# Patient Record
Sex: FEMALE | Race: AMERICAN INDIAN OR ALASKA NATIVE | NOT HISPANIC OR LATINO | Employment: OTHER | URBAN - METROPOLITAN AREA
[De-identification: names, ages, dates, MRNs, and addresses within clinical notes are randomized per-mention and may not be internally consistent; named-entity substitution may affect disease eponyms.]

---

## 2017-01-16 ENCOUNTER — OFFICE VISIT (OUTPATIENT)
Dept: URGENT CARE | Facility: PHYSICIAN GROUP | Age: 67
End: 2017-01-16
Payer: MEDICARE

## 2017-01-16 VITALS
DIASTOLIC BLOOD PRESSURE: 94 MMHG | HEIGHT: 64 IN | SYSTOLIC BLOOD PRESSURE: 146 MMHG | HEART RATE: 98 BPM | OXYGEN SATURATION: 94 % | BODY MASS INDEX: 33.97 KG/M2 | RESPIRATION RATE: 20 BRPM | WEIGHT: 199 LBS | TEMPERATURE: 98.8 F

## 2017-01-16 DIAGNOSIS — J20.8 ACUTE BRONCHITIS DUE TO OTHER SPECIFIED ORGANISMS: ICD-10-CM

## 2017-01-16 PROCEDURE — 99214 OFFICE O/P EST MOD 30 MIN: CPT | Performed by: PHYSICIAN ASSISTANT

## 2017-01-16 RX ORDER — ACETAMINOPHEN 325 MG/1
650 TABLET ORAL EVERY 4 HOURS PRN
COMMUNITY
End: 2017-05-16

## 2017-01-16 RX ORDER — IBUPROFEN 200 MG
200 TABLET ORAL EVERY 6 HOURS PRN
COMMUNITY
End: 2017-05-16

## 2017-01-16 RX ORDER — METHYLPREDNISOLONE 4 MG/1
TABLET ORAL
Qty: 21 TAB | Refills: 0 | Status: SHIPPED | OUTPATIENT
Start: 2017-01-16 | End: 2017-05-16

## 2017-01-16 RX ORDER — AZITHROMYCIN 250 MG/1
TABLET, FILM COATED ORAL
Qty: 6 TAB | Refills: 0 | Status: SHIPPED | OUTPATIENT
Start: 2017-01-16 | End: 2017-05-16

## 2017-01-16 NOTE — PROGRESS NOTES
Chief Complaint   Patient presents with   • Cold Symptoms     x2 weeks. Productive cough, sore throat, headache, congestion.       HISTORY OF PRESENT ILLNESS: Patient is a 66 y.o. female who presents today for 2 weeks of worsening chest congestion.  She is coughing up a lot of mucus.   She has been feeling SOB and feels she could have benefited from a rescue inhaler but does not have one herself.  She states her upper respiratory symptoms have improved and mostly in her chest at this point.  She states she has felt feverish on and off, chills.   She has no hx of smoking or COPD. Does have history of very mild asthma.   She has not taken anything for the cough.      Patient Active Problem List    Diagnosis Date Noted   • Bile leak, postoperative 08/06/2014     Priority: High     Class: Acute   • Abdominal pain 07/29/2014     Priority: High   • Common bile duct dilatation 07/29/2014     Priority: High   • HTN (hypertension) 08/09/2014     Priority: Medium   • Leukocytosis 08/07/2014     Priority: Medium   • Nausea & vomiting 08/06/2014     Priority: Medium   • Diarrhea 07/29/2014     Priority: Medium   • S/P laparoscopic cholecystectomy 08/06/2014     Priority: Low   • Gastric dilatation 08/06/2014     Priority: Low   • Mild intermittent asthma without complication 03/22/2016   • Encounter for cosmetic surgery 01/15/2016   • Other acquired absence of organ 10/08/2014   • Cholecystitis 08/04/2014       Allergies:Codeine; Eggs; Latex; and Pcn    Current Outpatient Prescriptions Ordered in [facility]   Medication Sig Dispense Refill   • acetaminophen (TYLENOL) 325 MG Tab Take 650 mg by mouth every four hours as needed.     • ibuprofen (MOTRIN) 200 MG Tab Take 200 mg by mouth every 6 hours as needed.     • fluconazole (DIFLUCAN) 150 MG tablet Take one tab PO day #1, wait 4 days, if still w/ s/sx take 2nd tab PO 2 Tab 0   • benzonatate (TESSALON) 100 MG Cap Take 1 Cap by mouth 3 times a day as needed for Cough. 60 Cap 0   •  "fluticasone (FLONASE) 50 MCG/ACT nasal spray Spray 2 Sprays in nose every day. 16 g 0   • meloxicam (MOBIC) 7.5 MG Tab Take 1 Tab by mouth every day. 30 Tab 0     No current Epic-ordered facility-administered medications on file.       Past Medical History   Diagnosis Date   • Renal disorder      kidney stones 2008   • Snoring    • Other specified disorder of intestines      diarrhea   • Breath shortness      asthma related   • ASTHMA      does not take inhaler( cold air and cigaretts)   • Anesthesia      sensitive to medications   • Bronchitis      hx [id number], 2010       Social History   Substance Use Topics   • Smoking status: Never Smoker    • Smokeless tobacco: Never Used      Comment: 1 ppd x 5 years; quit 1973   • Alcohol Use: No       No family status information on file.   No family history on file.No pertinent FH.     ROS:  Review of Systems   Constitutional: SEE HPI  HENT: Negative for ear pain, nosebleeds, congestion, sore throat and neck pain.    Eyes: Negative for blurred vision.   Respiratory: SEE HPI  Cardiovascular: Negative for chest pain, palpitations, orthopnea and leg swelling.   Gastrointestinal: Negative for heartburn, nausea, vomiting and abdominal pain.   All other systems reviewed and are negative.       Exam:  Blood pressure 146/94, pulse 98, temperature 37.1 °C (98.8 °F), resp. rate 20, height 1.626 m (5' 4\"), weight 90.266 kg (199 lb), SpO2 94 %, not currently breastfeeding.  General:  Well nourished, well developed female in NAD  Eyes: PERRLA, EOM within normal limits, no conjunctival injection, no scleral icterus, visual fields and acuity grossly intact.  Ears: Normal shape and symmetry, no tenderness, no discharge. External canals are without any significant edema or erythema. Tympanic membranes are without any inflammation, no effusion. Gross auditory acuity is intact  Nose: Symmetrical, sinuses without tenderness, clear rhinorrhea.   Mouth: reasonable hygiene, no erythema " exudates or tonsillar enlargement.  Neck: no masses, range of motion within normal limits, no tracheal deviation. No lymphadenopathy  Pulmonary: Normal respiratory effort, inspiratory and expiratory wheezes without crackles or rhonchi.   Cardiovascular: regular rate and rhythm without murmurs, rubs, or gallops.  Skin: No visible rashes or lesion. Warm, pink, dry.   Extremities: no clubbing, cyanosis, or edema.  Neuro: A&O x 3. Speech normal/clear.  Normal gait.         Assessment/Plan:  1. Acute bronchitis due to other specified organisms  azithromycin (ZITHROMAX) 250 MG Tab    MethylPREDNISolone (MEDROL DOSEPAK) 4 MG Tablet Therapy Pack       -fluids, rest emphasized.  Allegra or similar for post nasal drainage trial.   -humidifier/steam inhalations.  Good lung care emphasized.   -has new Proventil at home that  filled but hasnt had to use, will use this.   -Zpack/Medrol rx as above.      Supportive care, differential diagnoses, and indications for immediate follow-up discussed with patient.   Pathogenesis of diagnosis discussed including typical length and natural progression.   Instructed to return to clinic or nearest emergency department for any change in condition, further concerns, or worsening of symptoms.  Patient states understanding of the plan of care and discharge instructions.  Instructed to make an appointment, for follow up, with their primary care provider.      Bev Eugene PA-C

## 2017-01-16 NOTE — MR AVS SNAPSHOT
"        Bella Navarro   2017 10:55 AM   Office Visit   MRN: [id number]    Department:  [facility]   Dept Phone:  [phone]    Description:  Female : 1950   Provider:  Bev Eugene PA-C           Reason for Visit     Cold Symptoms x2 weeks. Productive cough, sore throat, headache, congestion.      Allergies as of 2017     Allergen Noted Reactions    Codeine 2014   Anaphylaxis    Eggs 2014   Diarrhea, Vomiting    GI distress.    Latex 2016   Rash    .    Pcn [Penicillins] 2014   Shortness of Breath      You were diagnosed with     Acute bronchitis due to other specified organisms   [4282686]         Vital Signs     Blood Pressure Pulse Temperature Respirations Height Weight    146/94 mmHg 98 37.1 °C (98.8 °F) 20 1.626 m (5' 4\") 90.266 kg (199 lb)    Body Mass Index Oxygen Saturation Breastfeeding? Smoking Status          34.14 kg/m2 94% No Never Smoker         Basic Information     Date Of Birth Sex Race Ethnicity Preferred Language    1950 Female White Non- English      Problem List              ICD-10-CM Priority Class Noted - Resolved    Abdominal pain R10.9 High  2014 - Present    Common bile duct dilatation K83.8 High  2014 - Present    Diarrhea R19.7 Medium  2014 - Present    Cholecystitis K81.9   2014 - Present    Bile leak, postoperative K91.89, K83.8 High Acute 2014 - Present    Nausea & vomiting R11.2 Medium  2014 - Present    S/P laparoscopic cholecystectomy Z90.49 Low  2014 - Present    Gastric dilatation K31.89 Low  2014 - Present    Leukocytosis D72.829 Medium  2014 - Present    HTN (hypertension) I10 Medium  2014 - Present    Other acquired absence of organ Z90.89   10/8/2014 - Present    Encounter for cosmetic surgery Z41.1   1/15/2016 - Present    Mild intermittent asthma without complication J45.20   3/22/2016 - Present      Health Maintenance        Date Due Completion Dates   " IMM PNEUMOCOCCAL 19-64 (ADULT) MEDIUM RISK SERIES (1 of 1 - PPSV23) 1/19/1971 ---    PAP SMEAR 1/19/1973 ---    MAMMOGRAM 1/19/1992 ---    COLONOSCOPY 1/19/2002 ---    IMM ZOSTER VACCINE 1/19/2012 ---    IMM INFLUENZA (1) 9/1/2016 ---    IMM DTaP/Tdap/Td Vaccine (2 - Td) 8/18/2026 8/18/2016            Current Immunizations     Tdap Vaccine 8/18/2016 10:56 PM      Below and/or attached are the medications your provider expects you to take. Review all of your home medications and newly ordered medications with your provider and/or pharmacist. Follow medication instructions as directed by your provider and/or pharmacist. Please keep your medication list with you and share with your provider. Update the information when medications are discontinued, doses are changed, or new medications (including over-the-counter products) are added; and carry medication information at all times in the event of emergency situations     Allergies:  CODEINE - Anaphylaxis     EGGS - Diarrhea,Vomiting     LATEX - Rash     PCN - Shortness of Breath               Medications  Valid as of: January 16, 2017 - 11:32 AM    Generic Name Brand Name Tablet Size Instructions for use    Acetaminophen (Tab) TYLENOL 325 MG Take 650 mg by mouth every four hours as needed.        Azithromycin (Tab) ZITHROMAX 250 MG Take as directed        Benzonatate (Cap) TESSALON 100 MG Take 1 Cap by mouth 3 times a day as needed for Cough.        Fluconazole (Tab) DIFLUCAN 150 MG Take one tab PO day #1, wait 4 days, if still w/ s/sx take 2nd tab PO        Fluticasone Propionate (Suspension) FLONASE 50 MCG/ACT Spray 2 Sprays in nose every day.        Ibuprofen (Tab) MOTRIN 200 MG Take 200 mg by mouth every 6 hours as needed.        Meloxicam (Tab) MOBIC 7.5 MG Take 1 Tab by mouth every day.        MethylPREDNISolone (Tablet Therapy Pack) MEDROL DOSEPAK 4 MG Take as directed        .                 Medicines prescribed today were sent to:     [id number] [place] -  [address] [facility]    [address] [facility]. [address] [place] [address]    Phone: [phone] Fax: [phone]    Open 24 Hours?: No      Medication refill instructions:       If your prescription bottle indicates you have medication refills left, it is not necessary to call your provider’s office. Please contact your pharmacy and they will refill your medication.    If your prescription bottle indicates you do not have any refills left, you may request refills at any time through one of the following ways: The online [email or web address] system (except Urgent Care), by calling your provider’s office, or by asking your pharmacy to contact your provider’s office with a refill request. Medication refills are processed only during regular business hours and may not be available until the next business day. Your provider may request additional information or to have a follow-up visit with you prior to refilling your medication.   *Please Note: Medication refills are assigned a new Rx number when refilled electronically. Your pharmacy may indicate that no refills were authorized even though a new prescription for the same medication is available at the pharmacy. Please request the medicine by name with the pharmacy before contacting your provider for a refill.           [email or web address] Access Code: Activation code not generated  Current [email or web address] Status: Active

## 2017-01-26 ENCOUNTER — APPOINTMENT (OUTPATIENT)
Dept: RADIOLOGY | Facility: IMAGING CENTER | Age: 67
End: 2017-01-26
Attending: PHYSICIAN ASSISTANT
Payer: MEDICARE

## 2017-01-26 ENCOUNTER — OFFICE VISIT (OUTPATIENT)
Dept: URGENT CARE | Facility: PHYSICIAN GROUP | Age: 67
End: 2017-01-26
Payer: MEDICARE

## 2017-01-26 VITALS
HEART RATE: 102 BPM | OXYGEN SATURATION: 95 % | DIASTOLIC BLOOD PRESSURE: 80 MMHG | HEIGHT: 64 IN | BODY MASS INDEX: 33.97 KG/M2 | SYSTOLIC BLOOD PRESSURE: 124 MMHG | RESPIRATION RATE: 28 BRPM | WEIGHT: 199 LBS | TEMPERATURE: 98.3 F

## 2017-01-26 DIAGNOSIS — R05.9 COUGH: ICD-10-CM

## 2017-01-26 DIAGNOSIS — J45.901 ASTHMA WITH ACUTE EXACERBATION, UNSPECIFIED ASTHMA SEVERITY: ICD-10-CM

## 2017-01-26 DIAGNOSIS — J40 BRONCHITIS: ICD-10-CM

## 2017-01-26 PROCEDURE — 4040F PNEUMOC VAC/ADMIN/RCVD: CPT | Mod: 8P | Performed by: PHYSICIAN ASSISTANT

## 2017-01-26 PROCEDURE — 71020 DX-CHEST-2 VIEWS: CPT | Mod: TC | Performed by: PHYSICIAN ASSISTANT

## 2017-01-26 PROCEDURE — 1101F PT FALLS ASSESS-DOCD LE1/YR: CPT | Mod: 8P | Performed by: PHYSICIAN ASSISTANT

## 2017-01-26 PROCEDURE — 3014F SCREEN MAMMO DOC REV: CPT | Mod: 8P | Performed by: PHYSICIAN ASSISTANT

## 2017-01-26 PROCEDURE — G8484 FLU IMMUNIZE NO ADMIN: HCPCS | Performed by: PHYSICIAN ASSISTANT

## 2017-01-26 PROCEDURE — G8419 CALC BMI OUT NRM PARAM NOF/U: HCPCS | Performed by: PHYSICIAN ASSISTANT

## 2017-01-26 PROCEDURE — 3017F COLORECTAL CA SCREEN DOC REV: CPT | Mod: 8P | Performed by: PHYSICIAN ASSISTANT

## 2017-01-26 PROCEDURE — 1036F TOBACCO NON-USER: CPT | Performed by: PHYSICIAN ASSISTANT

## 2017-01-26 PROCEDURE — 99214 OFFICE O/P EST MOD 30 MIN: CPT | Performed by: PHYSICIAN ASSISTANT

## 2017-01-26 PROCEDURE — G8432 DEP SCR NOT DOC, RNG: HCPCS | Performed by: PHYSICIAN ASSISTANT

## 2017-01-26 ASSESSMENT — ENCOUNTER SYMPTOMS
DIZZINESS: 0
CHILLS: 0
SHORTNESS OF BREATH: 1
SPUTUM PRODUCTION: 1
ABDOMINAL PAIN: 0
EYE DISCHARGE: 0
WHEEZING: 1
NECK PAIN: 0
EYE REDNESS: 0
MYALGIAS: 0
FEVER: 0
COUGH: 1
SORE THROAT: 0
HEADACHES: 0
VOMITING: 0
PALPITATIONS: 0
ORTHOPNEA: 0
HEMOPTYSIS: 0

## 2017-01-26 NOTE — MR AVS SNAPSHOT
"        Bella Navarro   2017 5:35 PM   Office Visit   MRN: [id number]    Department:  [facility]   Dept Phone:  [phone]    Description:  Female : 1950   Provider:  Luis A Mullins PA-C           Reason for Visit     Cough cough, fevers on and off, SOB, difficulty breathing, nasal and chest congestion, would like chest xray [id number]      Allergies as of 2017     Allergen Noted Reactions    Codeine 2014   Anaphylaxis    Eggs 2014   Diarrhea, Vomiting    GI distress.    Latex 2016   Rash    .    Pcn [Penicillins] 2014   Shortness of Breath      You were diagnosed with     Asthma with acute exacerbation, unspecified asthma severity   [2323438]       Bronchitis   [187957]       Cough   [786.2.ICD-9-CM]         Vital Signs     Blood Pressure Pulse Temperature Respirations Height Weight    124/80 mmHg 102 36.8 °C (98.3 °F) 28 1.626 m (5' 4.02\") 90.266 kg (199 lb)    Body Mass Index Oxygen Saturation Smoking Status             34.14 kg/m2 95% Never Smoker          Basic Information     Date Of Birth Sex Race Ethnicity Preferred Language    1950 Female White Non- English      Your appointments     May 16, 2017  9:00 AM   NEW TO YOU with Mitzy Lawson D.O.   [facility])    [address] [place] [address], [address]  [facility] [address] [phone]              Problem List              ICD-10-CM Priority Class Noted - Resolved    Abdominal pain R10.9 High  2014 - Present    Common bile duct dilatation K83.8 High  2014 - Present    Diarrhea R19.7 Medium  2014 - Present    Cholecystitis K81.9   2014 - Present    Bile leak, postoperative K91.89, K83.8 High Acute 2014 - Present    Nausea & vomiting R11.2 Medium  2014 - Present    S/P laparoscopic cholecystectomy Z90.49 Low  2014 - Present    Gastric dilatation K31.89 Low  2014 - Present    Leukocytosis D72.829 Medium  2014 - Present   " HTN (hypertension) I10 Medium  8/9/2014 - Present    Other acquired absence of organ Z90.89   10/8/2014 - Present    Encounter for cosmetic surgery Z41.1   1/15/2016 - Present    Mild intermittent asthma without complication J45.20   3/22/2016 - Present      Health Maintenance        Date Due Completion Dates    PAP SMEAR 1/19/1973 ---    MAMMOGRAM 1/19/1992 ---    COLONOSCOPY 1/19/2002 ---    IMM ZOSTER VACCINE 1/19/2012 ---    IMM INFLUENZA (1) 9/1/2016 ---    BONE DENSITY 1/19/2017 ---    IMM PNEUMOCOCCAL 65+ (ADULT) LOW/MEDIUM RISK SERIES (1 of 2 - PCV13) 1/19/2017 ---    IMM DTaP/Tdap/Td Vaccine (2 - Td) 8/18/2026 8/18/2016            Current Immunizations     Tdap Vaccine 8/18/2016 10:56 PM      Below and/or attached are the medications your provider expects you to take. Review all of your home medications and newly ordered medications with your provider and/or pharmacist. Follow medication instructions as directed by your provider and/or pharmacist. Please keep your medication list with you and share with your provider. Update the information when medications are discontinued, doses are changed, or new medications (including over-the-counter products) are added; and carry medication information at all times in the event of emergency situations     Allergies:  CODEINE - Anaphylaxis     EGGS - Diarrhea,Vomiting     LATEX - Rash     PCN - Shortness of Breath               Medications  Valid as of: January 26, 2017 -  6:44 PM    Generic Name Brand Name Tablet Size Instructions for use    Acetaminophen (Tab) TYLENOL 325 MG Take 650 mg by mouth every four hours as needed.        Azithromycin (Tab) ZITHROMAX 250 MG Take as directed        Benzonatate (Cap) TESSALON 100 MG Take 1 Cap by mouth 3 times a day as needed for Cough.        Fluconazole (Tab) DIFLUCAN 150 MG Take one tab PO day #1, wait 4 days, if still w/ s/sx take 2nd tab PO        Fluticasone Propionate (Suspension) FLONASE 50 MCG/ACT Spray 2 Sprays in  nose every day.        Ibuprofen (Tab) MOTRIN 200 MG Take 200 mg by mouth every 6 hours as needed.        Meloxicam (Tab) MOBIC 7.5 MG Take 1 Tab by mouth every day.        MethylPREDNISolone (Tablet Therapy Pack) MEDROL DOSEPAK 4 MG Take as directed        .                 Medicines prescribed today were sent to:     [id number] - [place], [place] - [address] [facility]. [address]    [address] [place] [address]    Phone: [phone] Fax: [phone]    Open 24 Hours?: No      Medication refill instructions:       If your prescription bottle indicates you have medication refills left, it is not necessary to call your provider’s office. Please contact your pharmacy and they will refill your medication.    If your prescription bottle indicates you do not have any refills left, you may request refills at any time through one of the following ways: The online [employer] system (except Urgent Care), by calling your provider’s office, or by asking your pharmacy to contact your provider’s office with a refill request. Medication refills are processed only during regular business hours and may not be available until the next business day. Your provider may request additional information or to have a follow-up visit with you prior to refilling your medication.   *Please Note: Medication refills are assigned a new Rx number when refilled electronically. Your pharmacy may indicate that no refills were authorized even though a new prescription for the same medication is available at the pharmacy. Please request the medicine by name with the pharmacy before contacting your provider for a refill.        Your To Do List     Future Labs/Procedures Complete By Expires    DX-CHEST-2 VIEWS  As directed 1/26/2018      Referral     A referral request has been sent to our patient care coordination department. Please allow 3-5 business days for us to process this request and contact you either by phone or mail. If you do not hear from  us by the 5th business day, please call us at [phone].           Xierk[employer] Access Code: Activation code not generated  Current Xierkang Status: Active

## 2017-01-27 NOTE — PROGRESS NOTES
Subjective:      Bella Navarro is a 67 y.o. female who presents with Cough          Pt is 66 y/o female who presents with persistent cough for the last 8 weeks. Pt. Reports that she thinks it started when she originally evaluated on 12/8 for sinusitis- she was started on Doxycycline which helped her sinuses however her cough lingered. She then reports that it came back more severe and was evaluated on 1/16 and was started on Zpak/medrol dosepak, and started on proventil for bronchitis. She reports that she has since gotten worse since the last visit. She is not taking any OTC meds, and notes that the ABX therapy nor the oral steroids helped at all.   Cough  This is a recurrent problem. Episode onset: 8 weeks. The problem has been waxing and waning. The problem occurs every few minutes. The cough is productive of sputum. Associated symptoms include nasal congestion, shortness of breath and wheezing. Pertinent negatives include no chest pain, chills, ear congestion, ear pain, eye redness, fever, headaches, hemoptysis, myalgias, postnasal drip, rash or sore throat. The symptoms are aggravated by cold air, exercise, dust and lying down. She has tried a beta-agonist inhaler, OTC cough suppressant, oral steroids and OTC inhaler for the symptoms. The treatment provided mild relief. Her past medical history is significant for asthma.   She reports that she is trying to do as many home remedies that she can as she would like to avoid RX meds- ie. Tumeric, gerardo, probiotics, DoTERRA oils, Rice baths.        Review of Systems   Constitutional: Positive for malaise/fatigue. Negative for fever and chills.   HENT: Negative for congestion, ear pain, postnasal drip and sore throat.    Eyes: Negative for discharge and redness.   Respiratory: Positive for cough, sputum production, shortness of breath and wheezing. Negative for hemoptysis.    Cardiovascular: Negative for chest pain, palpitations, orthopnea and leg swelling.  "  Gastrointestinal: Negative for vomiting and abdominal pain.   Genitourinary: Negative for dysuria and urgency.   Musculoskeletal: Negative for myalgias and neck pain.   Skin: Negative for itching and rash.   Neurological: Negative for dizziness and headaches.          Objective:     /80 mmHg  Pulse 102  Temp(Src) 36.8 °C (98.3 °F)  Resp 28  Ht 1.626 m (5' 4.02\")  Wt 90.266 kg (199 lb)  BMI 34.14 kg/m2  SpO2 95%   PMH:  has a past medical history of Renal disorder; Snoring; Other specified disorder of intestines; Breath shortness; ASTHMA; Anesthesia; and Bronchitis. She also has no past medical history of COPD, CAD (coronary artery disease), Liver disease, or Psychiatric disorder.  MEDS:   Current outpatient prescriptions:   •  acetaminophen (TYLENOL) 325 MG Tab, Take 650 mg by mouth every four hours as needed., Disp: , Rfl:   •  ibuprofen (MOTRIN) 200 MG Tab, Take 200 mg by mouth every 6 hours as needed., Disp: , Rfl:   •  azithromycin (ZITHROMAX) 250 MG Tab, Take as directed, Disp: 6 Tab, Rfl: 0  •  MethylPREDNISolone (MEDROL DOSEPAK) 4 MG Tablet Therapy Pack, Take as directed, Disp: 21 Tab, Rfl: 0  •  fluconazole (DIFLUCAN) 150 MG tablet, Take one tab PO day #1, wait 4 days, if still w/ s/sx take 2nd tab PO, Disp: 2 Tab, Rfl: 0  •  benzonatate (TESSALON) 100 MG Cap, Take 1 Cap by mouth 3 times a day as needed for Cough., Disp: 60 Cap, Rfl: 0  •  fluticasone (FLONASE) 50 MCG/ACT nasal spray, Spray 2 Sprays in nose every day., Disp: 16 g, Rfl: 0  •  meloxicam (MOBIC) 7.5 MG Tab, Take 1 Tab by mouth every day., Disp: 30 Tab, Rfl: 0  ALLERGIES:   Allergies   Allergen Reactions   • Codeine Anaphylaxis   • Eggs Diarrhea and Vomiting     GI distress.   • Latex Rash     .   • Pcn [Penicillins] Shortness of Breath     SURGHX:   Past Surgical History   Procedure Laterality Date   • Tonsillectomy     • Pr enlarge breast with implant  1996   • Mya by laparoscopy  8/4/2014     Performed by Los Salas M.D. " at SURGERY [facility]   • Ercp in or  8/12/2014     Performed by Martinez Siegel M.D. at SURGERY [facility]   • Ercp  10/8/2014     Performed by Martinez Siegel M.D. at SURGERY [facility]   • Breast implant removal Bilateral 1/15/2016     Procedure: BREAST IMPLANT EXCHANGE;  Surgeon: Martinez Martin M.D.;  Location: SURGERY [facility];  Service:    • Capsulectomy Bilateral 1/15/2016     Procedure: CAPSULECTOMY AND CAPSULORRHAPHIES;  Surgeon: Martinez Martin M.D.;  Location: SURGERY [facility];  Service:    • Mastopexy Bilateral 1/15/2016     Procedure: MASTOPEXY BENELLI;  Surgeon: Martinez Martin M.D.;  Location: [facility];  Service:      SOCHX:  reports that she has never smoked. She has never used smokeless tobacco. She reports that she does not drink alcohol or use illicit drugs.  FH: Family history was reviewed, no pertinent findings to report    Physical Exam   Constitutional: She is oriented to person, place, and time. She appears well-developed and well-nourished.   HENT:   Head: Normocephalic and atraumatic.   Right Ear: External ear normal.   Left Ear: External ear normal.   Nose: Nose normal.   Mouth/Throat: Oropharynx is clear and moist. No oropharyngeal exudate.   Eyes: EOM are normal. Pupils are equal, round, and reactive to light.   Neck: Normal range of motion. Neck supple.   Cardiovascular:   No murmur heard.  Tachycardia  Without JVD   Pulmonary/Chest: Effort normal and breath sounds normal. No respiratory distress. She has no wheezes.   Musculoskeletal: She exhibits no edema or tenderness.   Lymphadenopathy:     She has no cervical adenopathy.   Neurological: She is alert and oriented to person, place, and time.   Skin: Skin is warm. No rash noted.   Psychiatric: She has a normal mood and affect. Her behavior is normal.   Vitals reviewed.            Reviewed by myself and with the pt. Today-   CXR: Without acute cardiopulmonary changes.      Assessment/Plan:     1. Asthma with acute exacerbation, unspecified asthma severity  - REFERRAL TO PULMONOLOGY    2. Bronchitis  - REFERRAL TO PULMONOLOGY    3. Cough  - DX-CHEST-2 VIEWS; Future  - REFERRAL TO PULMONOLOGY    Pt. Refuses any further trials of inhalers- I do suspect that her symptoms are viral in nature. She declined inhaled steroid. I encouraged Mucinex at this time.   Pt. Was agreeable to see Pulm. Of which pt. Has been seen by  for similar symptoms without subjective improvements. Of note pt. Is without CHF symptoms  Other than cough and SOB- pt. Is without orthopnea or JVD- and without cardiomegaly.   Also pt. Is without chest pain, or pleuritic pain- PE is unlikely- also without hx of smoking, HRT, DVT/PE, or recent travel or stasis. Neg. Calf pain.  I do suspect uncontrolled asthma that has been exacerbated by the bronchitis of which has been adequately treated if bacterial- however most likely viral in nature.   Patient given precautionary s/sx that mandate immediate follow up and evaluation in the ED. Advised of risks of not doing so.    DDX, Supportive care, and indications for immediate follow-up discussed with patient.    Instructed to return to clinic or nearest emergency department if we are not available for any change in condition, further concerns, or worsening of symptoms.    The patient demonstrated a good understanding and agreed with the treatment plan.

## 2017-04-04 ENCOUNTER — TELEPHONE (OUTPATIENT)
Dept: PULMONOLOGY | Facility: HOSPICE | Age: 67
End: 2017-04-04

## 2017-04-04 DIAGNOSIS — R06.00 DYSPNEA, UNSPECIFIED TYPE: ICD-10-CM

## 2017-04-05 ENCOUNTER — NON-PROVIDER VISIT (OUTPATIENT)
Dept: PULMONOLOGY | Facility: HOSPICE | Age: 67
End: 2017-04-05
Payer: MEDICARE

## 2017-04-05 ENCOUNTER — OFFICE VISIT (OUTPATIENT)
Dept: PULMONOLOGY | Facility: HOSPICE | Age: 67
End: 2017-04-05
Payer: MEDICARE

## 2017-04-05 VITALS
BODY MASS INDEX: 35.08 KG/M2 | HEART RATE: 80 BPM | RESPIRATION RATE: 16 BRPM | HEIGHT: 63 IN | DIASTOLIC BLOOD PRESSURE: 72 MMHG | WEIGHT: 198 LBS | SYSTOLIC BLOOD PRESSURE: 116 MMHG | TEMPERATURE: 97.8 F | OXYGEN SATURATION: 95 %

## 2017-04-05 VITALS — HEIGHT: 63 IN | BODY MASS INDEX: 35.08 KG/M2 | WEIGHT: 198 LBS

## 2017-04-05 DIAGNOSIS — R06.00 DYSPNEA, UNSPECIFIED TYPE: ICD-10-CM

## 2017-04-05 DIAGNOSIS — J45.30 MILD PERSISTENT ASTHMA WITHOUT COMPLICATION: ICD-10-CM

## 2017-04-05 PROCEDURE — 1101F PT FALLS ASSESS-DOCD LE1/YR: CPT | Performed by: INTERNAL MEDICINE

## 2017-04-05 PROCEDURE — 99999 PR NO CHARGE: CPT | Performed by: INTERNAL MEDICINE

## 2017-04-05 PROCEDURE — 94729 DIFFUSING CAPACITY: CPT | Performed by: INTERNAL MEDICINE

## 2017-04-05 PROCEDURE — 99204 OFFICE O/P NEW MOD 45 MIN: CPT | Mod: 25 | Performed by: INTERNAL MEDICINE

## 2017-04-05 PROCEDURE — 94060 EVALUATION OF WHEEZING: CPT | Performed by: INTERNAL MEDICINE

## 2017-04-05 PROCEDURE — 94726 PLETHYSMOGRAPHY LUNG VOLUMES: CPT | Performed by: INTERNAL MEDICINE

## 2017-04-05 PROCEDURE — G8419 CALC BMI OUT NRM PARAM NOF/U: HCPCS | Performed by: INTERNAL MEDICINE

## 2017-04-05 RX ORDER — PREDNISONE 10 MG/1
TABLET ORAL
Qty: 30 TAB | Refills: 2 | Status: SHIPPED | OUTPATIENT
Start: 2017-04-05 | End: 2017-05-16

## 2017-04-05 RX ORDER — BUDESONIDE AND FORMOTEROL FUMARATE DIHYDRATE 160; 4.5 UG/1; UG/1
2 AEROSOL RESPIRATORY (INHALATION) 2 TIMES DAILY
Qty: 1 INHALER | Refills: 11 | Status: SHIPPED | OUTPATIENT
Start: 2017-04-05 | End: 2017-05-16

## 2017-04-05 RX ORDER — LEVALBUTEROL TARTRATE 45 UG/1
2 AEROSOL, METERED ORAL EVERY 4 HOURS PRN
Qty: 1 INHALER | Refills: 11 | Status: SHIPPED | OUTPATIENT
Start: 2017-04-05 | End: 2017-05-16

## 2017-04-05 RX ORDER — MONTELUKAST SODIUM 10 MG/1
10 TABLET ORAL
Qty: 30 TAB | Refills: 11 | Status: SHIPPED | OUTPATIENT
Start: 2017-04-05 | End: 2017-05-16

## 2017-04-05 ASSESSMENT — PULMONARY FUNCTION TESTS
FEV1_PERCENT_PREDICTED: 94
FEV1_PERCENT_CHANGE: 1
FEV1/FVC_PERCENT_CHANGE: 50
FVC_PREDICTED: 3.03
FVC: 2.65
FEV1/FVC: 84
FVC_PERCENT_PREDICTED: 85
FEV1/FVC_PERCENT_PREDICTED: 109
FEV1: 2.2
FEV1_PREDICTED: 2.32
FEV1/FVC_PERCENT_PREDICTED: 108
FEV1/FVC: 83.02
FEV1_PERCENT_PREDICTED: 93
FVC_PERCENT_PREDICTED: 87
FEV1/FVC_PERCENT_PREDICTED: 77
FEV1: 2.16
FEV1_PERCENT_CHANGE: 2
FVC: 2.58

## 2017-04-05 ASSESSMENT — PATIENT HEALTH QUESTIONNAIRE - PHQ9: CLINICAL INTERPRETATION OF PHQ2 SCORE: 0

## 2017-04-05 NOTE — Clinical Note
Jorge Urias M.D.  [facility]   [address] [place], [place] [address], [place] [address]  Phone: [phone] - Fax: [phone]           Encounter Date: 4/5/2017  Provider: Jorge Urias M.D.  Location of Care: [facility] PULMONARY MEDICINE      Patient:   Bella Navarro   MR Number: [id number]   YOB: 1950     PROGRESS NOTE:  No notes on file      Electronically signed by Jorge Urias M.D.  on 04/05/2017    No Recipients

## 2017-04-05 NOTE — MR AVS SNAPSHOT
"        Bella Navarro   2017 10:00 AM   Office Visit   MRN: [id number]    Department:  [facility]   Dept Phone:  [phone]    Description:  Female : 1950   Provider:  Jorge Urias M.D.           Reason for Visit     New Patient Asthma      Allergies as of 2017     Allergen Noted Reactions    Codeine 2014   Anaphylaxis    Eggs 2014   Diarrhea, Vomiting    GI distress.    Latex 2016   Rash    .    Pcn [Penicillins] 2014   Shortness of Breath      You were diagnosed with     Mild persistent asthma without complication   [356391]         Vital Signs     Blood Pressure Pulse Temperature Respirations Height Weight    116/72 mmHg 80 36.6 °C (97.8 °F) 16 1.6 m (5' 3\") 89.812 kg (198 lb)    Body Mass Index Oxygen Saturation Smoking Status             35.08 kg/m2 95% Never Smoker          Basic Information     Date Of Birth Sex Race Ethnicity Preferred Language    1950 Female White Non- English      Your appointments     May 16, 2017  9:00 AM   NEW TO YOU with Mitzy Lawson D.O.   [facility])    [facility], [address]  [facility] [address] [phone]              Problem List              ICD-10-CM Priority Class Noted - Resolved    Abdominal pain R10.9 High  2014 - Present    Common bile duct dilatation K83.8 High  2014 - Present    Diarrhea R19.7 Medium  2014 - Present    Cholecystitis K81.9   2014 - Present    Bile leak, postoperative K91.89, K83.8 High Acute 2014 - Present    Nausea & vomiting R11.2 Medium  2014 - Present    S/P laparoscopic cholecystectomy Z90.49 Low  2014 - Present    Gastric dilatation K31.89 Low  2014 - Present    Leukocytosis D72.829 Medium  2014 - Present    HTN (hypertension) I10 Medium  2014 - Present    Other acquired absence of organ Z90.89   10/8/2014 - Present    Encounter for cosmetic surgery Z41.1   1/15/2016 - Present    Mild " intermittent asthma without complication J45.20   3/22/2016 - Present      Health Maintenance        Date Due Completion Dates    PAP SMEAR 1/19/1973 ---    MAMMOGRAM 1/19/1992 ---    COLONOSCOPY 1/19/2002 ---    IMM ZOSTER VACCINE 1/19/2012 ---    BONE DENSITY 1/19/2017 ---    IMM PNEUMOCOCCAL 65+ (ADULT) LOW/MEDIUM RISK SERIES (1 of 2 - PCV13) 1/19/2017 ---    IMM DTaP/Tdap/Td Vaccine (2 - Td) 8/18/2026 8/18/2016            Current Immunizations     Tdap Vaccine 8/18/2016 10:56 PM      Below and/or attached are the medications your provider expects you to take. Review all of your home medications and newly ordered medications with your provider and/or pharmacist. Follow medication instructions as directed by your provider and/or pharmacist. Please keep your medication list with you and share with your provider. Update the information when medications are discontinued, doses are changed, or new medications (including over-the-counter products) are added; and carry medication information at all times in the event of emergency situations     Allergies:  CODEINE - Anaphylaxis     EGGS - Diarrhea,Vomiting     LATEX - Rash     PCN - Shortness of Breath               Medications  Valid as of: April 05, 2017 - 10:53 AM    Generic Name Brand Name Tablet Size Instructions for use    Acetaminophen (Tab) TYLENOL 325 MG Take 650 mg by mouth every four hours as needed.        Azithromycin (Tab) ZITHROMAX 250 MG Take as directed        Benzonatate (Cap) TESSALON 100 MG Take 1 Cap by mouth 3 times a day as needed for Cough.        Budesonide-Formoterol Fumarate (Aerosol) SYMBICORT 160-4.5 MCG/ACT Inhale 2 Puffs by mouth 2 Times a Day. Use with spacer.  Rinse mouth after each use.        Fluconazole (Tab) DIFLUCAN 150 MG Take one tab PO day #1, wait 4 days, if still w/ s/sx take 2nd tab PO        Fluticasone Propionate (Suspension) FLONASE 50 MCG/ACT Spray 2 Sprays in nose every day.        Ibuprofen (Tab) MOTRIN 200 MG Take 200  mg by mouth every 6 hours as needed.        Levalbuterol Tartrate (Aerosol) XOPENEX HFA 45 MCG/ACT Inhale 2 Puffs by mouth every four hours as needed for Shortness of Breath (As needed for shortness of breath, cough, wheezing.).        Meloxicam (Tab) MOBIC 7.5 MG Take 1 Tab by mouth every day.        MethylPREDNISolone (Tablet Therapy Pack) MEDROL DOSEPAK 4 MG Take as directed        Montelukast Sodium (Tab) SINGULAIR 10 MG Take 1 Tab by mouth every bedtime.        PredniSONE (Tab) DELTASONE 10 MG Take 30mg x 5 days, then take 20mg x 5 days, then take 10mg x 5 days, with food, then discontinue.        .                 Medicines prescribed today were sent to:     [facility] - [place], [place] - [address] [facility]    [address] [place] [address]    Phone: [phone] Fax: [phone]    Open 24 Hours?: No      Medication refill instructions:       If your prescription bottle indicates you have medication refills left, it is not necessary to call your provider’s office. Please contact your pharmacy and they will refill your medication.    If your prescription bottle indicates you do not have any refills left, you may request refills at any time through one of the following ways: The online [employer] system (except Urgent Care), by calling your provider’s office, or by asking your pharmacy to contact your provider’s office with a refill request. Medication refills are processed only during regular business hours and may not be available until the next business day. Your provider may request additional information or to have a follow-up visit with you prior to refilling your medication.   *Please Note: Medication refills are assigned a new Rx number when refilled electronically. Your pharmacy may indicate that no refills were authorized even though a new prescription for the same medication is available at the pharmacy. Please request the medicine by name with the pharmacy before contacting your  provider for a refill.        Referral     A referral request has been sent to our patient care coordination department. Please allow 3-5 business days for us to process this request and contact you either by phone or mail. If you do not hear from us by the 5th business day, please call us at [phone].           [email or web address] Access Code: Activation code not generated  Current [email or web address] Status: Active

## 2017-04-05 NOTE — MR AVS SNAPSHOT
"        Bella Navarro   2017 9:00 AM   Non-Provider Visit   MRN: [id number]    Department:  P[facility]   Dept Phone:  [phone]    Description:  Female : 1950   Provider:  Jorge Urias M.D.           Reason for Visit     Shortness of Breath           Allergies as of 2017     Allergen Noted Reactions    Codeine 2014   Anaphylaxis    Eggs 2014   Diarrhea, Vomiting    GI distress.    Latex 2016   Rash    .    Pcn [Penicillins] 2014   Shortness of Breath      You were diagnosed with     Dyspnea, unspecified type   [2533096]         Vital Signs     Height Weight Body Mass Index Smoking Status          1.6 m (5' 3\") 89.812 kg (198 lb) 35.08 kg/m2 Never Smoker         Basic Information     Date Of Birth Sex Race Ethnicity Preferred Language    1950 Female White Non- English      Your appointments     2017 10:00 AM   New Patient Pulmonary with Jorge Urias M.D.   [facility] Medicine (--)    [address] [facility]  [address]  [place] [id number] [phone]            May 16, 2017  9:00 AM   NEW TO YOU with Mitzy Lawson D.O.   [facility])    [address], [address]  [place] [address] [phone]              Problem List              ICD-10-CM Priority Class Noted - Resolved    Abdominal pain R10.9 High  2014 - Present    Common bile duct dilatation K83.8 High  2014 - Present    Diarrhea R19.7 Medium  2014 - Present    Cholecystitis K81.9   2014 - Present    Bile leak, postoperative K91.89, K83.8 High Acute 2014 - Present    Nausea & vomiting R11.2 Medium  2014 - Present    S/P laparoscopic cholecystectomy Z90.49 Low  2014 - Present    Gastric dilatation K31.89 Low  2014 - Present    Leukocytosis D72.829 Medium  2014 - Present    HTN (hypertension) I10 Medium  2014 - Present    Other acquired absence of organ Z90.89   10/8/2014 - Present   "    Encounter for cosmetic surgery Z41.1   1/15/2016 - Present    Mild intermittent asthma without complication J45.20   3/22/2016 - Present      Health Maintenance        Date Due Completion Dates    PAP SMEAR 1/19/1973 ---    MAMMOGRAM 1/19/1992 ---    COLONOSCOPY 1/19/2002 ---    IMM ZOSTER VACCINE 1/19/2012 ---    BONE DENSITY 1/19/2017 ---    IMM PNEUMOCOCCAL 65+ (ADULT) LOW/MEDIUM RISK SERIES (1 of 2 - PCV13) 1/19/2017 ---    IMM DTaP/Tdap/Td Vaccine (2 - Td) 8/18/2026 8/18/2016            Current Immunizations     Tdap Vaccine 8/18/2016 10:56 PM      Below and/or attached are the medications your provider expects you to take. Review all of your home medications and newly ordered medications with your provider and/or pharmacist. Follow medication instructions as directed by your provider and/or pharmacist. Please keep your medication list with you and share with your provider. Update the information when medications are discontinued, doses are changed, or new medications (including over-the-counter products) are added; and carry medication information at all times in the event of emergency situations     Allergies:  CODEINE - Anaphylaxis     EGGS - Diarrhea,Vomiting     LATEX - Rash     PCN - Shortness of Breath               Medications  Valid as of: April 05, 2017 -  9:50 AM    Generic Name Brand Name Tablet Size Instructions for use    Acetaminophen (Tab) TYLENOL 325 MG Take 650 mg by mouth every four hours as needed.        Azithromycin (Tab) ZITHROMAX 250 MG Take as directed        Benzonatate (Cap) TESSALON 100 MG Take 1 Cap by mouth 3 times a day as needed for Cough.        Fluconazole (Tab) DIFLUCAN 150 MG Take one tab PO day #1, wait 4 days, if still w/ s/sx take 2nd tab PO        Fluticasone Propionate (Suspension) FLONASE 50 MCG/ACT Spray 2 Sprays in nose every day.        Ibuprofen (Tab) MOTRIN 200 MG Take 200 mg by mouth every 6 hours as needed.        Meloxicam (Tab) MOBIC 7.5 MG Take 1 Tab by  mouth every day.        MethylPREDNISolone (Tablet Therapy Pack) MEDROL DOSEPAK 4 MG Take as directed        .                 Medicines prescribed today were sent to:     [facility] - [place], [place] - [address] [facility]. [address]    [address] [facility]. [address] [place] [address]    Phone: [phone] Fax: [phone]    Open 24 Hours?: No      Medication refill instructions:       If your prescription bottle indicates you have medication refills left, it is not necessary to call your provider’s office. Please contact your pharmacy and they will refill your medication.    If your prescription bottle indicates you do not have any refills left, you may request refills at any time through one of the following ways: The online [employer] system (except Urgent Care), by calling your provider’s office, or by asking your pharmacy to contact your provider’s office with a refill request. Medication refills are processed only during regular business hours and may not be available until the next business day. Your provider may request additional information or to have a follow-up visit with you prior to refilling your medication.   *Please Note: Medication refills are assigned a new Rx number when refilled electronically. Your pharmacy may indicate that no refills were authorized even though a new prescription for the same medication is available at the pharmacy. Please request the medicine by name with the pharmacy before contacting your provider for a refill.           [employer] Access Code: Activation code not generated  Current [employer] Status: Active

## 2017-04-05 NOTE — Clinical Note
Jorge Urias M.D.  [facility] Pulmonary Medicine   [address] [place] [address] [place] [address]  Phone: [phone] - Fax: [phone]           Encounter Date: 4/5/2017  Provider: Jorge Urias M.D.  Location of Care: [facility] PULMONARY MEDICINE      Patient:   Bella Navarro   MR Number: [id number]   YOB: 1950     PROGRESS NOTE:  Chief Complaint   Patient presents with   • New Patient     Asthma       HPI:  The patient is a 67-year-old woman with a history of cough and dyspnea since January of this year. She said she had 2 episodes of bronchitis. She was treated with antibiotics. However her cough and dyspnea persist. On reflection she says that she has had multiple episodes of bronchitis in her life. She is bothered by cigarette smoke, cold weather, and pollens. She has never been formally diagnosed with asthma. She does feel that she has some degree of allergic disease. She has never had an allergy evaluation. She does tend to prefer homeopathic techniques and medications to standard allopathic treatment. She works as a massage therapist. Pulmonary function testing today shows an FEV1 of 2.16 L which is 93% of predicted. The FEV1 FVC ratio is normal. There is no good response to an inhaled bronchodilator. Lung volumes and DLCO are normal. She has tried albuterol as a rescue inhaler but it causes palpitations and extreme jitteriness even if she uses one puff rather than two.    Past Medical History   Diagnosis Date   • Renal disorder      kidney stones 2008   • Snoring    • Other specified disorder of intestines      diarrhea   • Breath shortness      asthma related   • ASTHMA      does not take inhaler( cold air and cigaretts)   • Anesthesia      sensitive to medications   • Bronchitis      hx [id number], 2010   • Depression    • Hypertension    • Obesity    • Pneumonia    • Nasal drainage        ROS:  Constitutional: Denies fevers, chills, night  sweats, fatigue or weight loss  Eyes: Denies vision loss, pain, drainage, double vision  Ears, Nose, Throat: Denies earache, tinnitus, hoarseness  Cardiovascular: Denies chest pain, tightness, palpitations  Respiratory: See history of present illness  Sleep: Denies, snoring, apnea  GI: Denies abdominal pain, nausea, vomiting, diarrhea  : Denies frequent urination, hematuria, painful urination  Musculoskeletal: Denies back pain, painful joints, sore muscles  Neurological: Denies headaches, seizures  Skin: Denies rashes, color changes  Psychiatric: Denies depression or thoughts of suicide  Hematologic: Denies bleeding tendency or clotting tendency  Allergic/Immunologic: Denies rhinitis, skin sensitivity    Social History     Social History   • Marital Status:      Spouse Name: N/A   • Number of Children: N/A   • Years of Education: N/A     Occupational History   • Not on file.     Social History Main Topics   • Smoking status: Never Smoker    • Smokeless tobacco: Never Used      Comment: 1 ppd x 5 years; quit 1973   • Alcohol Use: No   • Drug Use: No   • Sexual Activity: Not on file     Other Topics Concern   • Not on file     Social History Narrative     Codeine; Eggs; Latex; and Pcn  Current Outpatient Prescriptions on File Prior to Visit   Medication Sig Dispense Refill   • acetaminophen (TYLENOL) 325 MG Tab Take 650 mg by mouth every four hours as needed.     • ibuprofen (MOTRIN) 200 MG Tab Take 200 mg by mouth every 6 hours as needed.     • azithromycin (ZITHROMAX) 250 MG Tab Take as directed 6 Tab 0   • MethylPREDNISolone (MEDROL DOSEPAK) 4 MG Tablet Therapy Pack Take as directed 21 Tab 0   • fluconazole (DIFLUCAN) 150 MG tablet Take one tab PO day #1, wait 4 days, if still w/ s/sx take 2nd tab PO 2 Tab 0   • benzonatate (TESSALON) 100 MG Cap Take 1 Cap by mouth 3 times a day as needed for Cough. 60 Cap 0   • fluticasone (FLONASE) 50 MCG/ACT nasal spray Spray 2 Sprays in nose every day. 16 g 0   •  "meloxicam (MOBIC) 7.5 MG Tab Take 1 Tab by mouth every day. 30 Tab 0     No current facility-administered medications on file prior to visit.     Blood pressure 116/72, pulse 80, temperature 36.6 °C (97.8 °F), resp. rate 16, height 1.6 m (5' 3\"), weight 89.812 kg (198 lb), SpO2 95 %.  Family History   Problem Relation Age of Onset   • Heart Attack Father        Physical Exam:    HEENT: PERRLA, EOMI, no scleral icterus, no nasal or oral lesions  Neck: No thyromegaly, no adenopathy, no bruits  Mallampatti: Grade II  Lungs: Equal breath sounds, no wheezes or crackles  Heart: Regular rate and rhythm, no gallops or murmurs  Abdomen: Soft, benign, no organomegaly  Extremities: No clubbing, cyanosis, or edema  Neurologic: Cranial nerve, motor, and sensory exam are normal    1. Mild persistent asthma without complication        Clinically this woman has asthma. PFTs are normal today. She has a history of allergies and is also bothered by nonspecific irritants. She has had multiple episodes of \"bronchitis\".  We spent quite a bit of time explaining the nature of asthma as an inflammatory disease. She seemed to appreciate the discussion.  She is willing to try medications at this time if they help.  We will give her tapering prednisone.  She will then start Symbicort and Singulair.  Because of her sensitivity to albuterol we will prescribe Xopenex.  She is also open to immunotherapy if would help her asthma. We will give her a referral to allergy.  We will see her in follow-up in about 6-8 weeks.        Electronically signed by Jorge Urias M.D.  on 04/05/2017    No Recipients                    "

## 2017-04-05 NOTE — PROCEDURES
Technician: ANURADHA Bailon    Technician Comment:  Good patient effort & cooperation.  The results of this test meet the ATS/ERS standards for acceptability & reproducibility.  Test was performed on the [employer] Plethysmograph-Elite DX system.  Predicted values were [facility]-3 for spirometry, [facility] for DLCO, ITS for Lung Volumes.  The DLCO was uncorrected for Hgb.  A bronchodilator of Xopenex HFA -2puffs via spacer administered.    Interpretation:    FEV1 is 2.16 L which is 93% of predicted. FEV1 FVC ratio is normal. There is no response with inhaled bronchodilator.  Lungs volumes are normal.  DLCO is 129% of predicted. Airways resistance is normal.    No definite evidence of any significant obstructive or restrictive pulmonary disease is noted on this study. This study is compatible with the presence of asthma in good control.

## 2017-04-06 NOTE — PROGRESS NOTES
Chief Complaint   Patient presents with   • New Patient     Asthma       HPI:  The patient is a 67-year-old woman with a history of cough and dyspnea since January of this year. She said she had 2 episodes of bronchitis. She was treated with antibiotics. However her cough and dyspnea persist. On reflection she says that she has had multiple episodes of bronchitis in her life. She is bothered by cigarette smoke, cold weather, and pollens. She has never been formally diagnosed with asthma. She does feel that she has some degree of allergic disease. She has never had an allergy evaluation. She does tend to prefer homeopathic techniques and medications to standard allopathic treatment. She works as a massage therapist. Pulmonary function testing today shows an FEV1 of 2.16 L which is 93% of predicted. The FEV1 FVC ratio is normal. There is no good response to an inhaled bronchodilator. Lung volumes and DLCO are normal. She has tried albuterol as a rescue inhaler but it causes palpitations and extreme jitteriness even if she uses one puff rather than two.    Past Medical History   Diagnosis Date   • Renal disorder      kidney stones 2008   • Snoring    • Other specified disorder of intestines      diarrhea   • Breath shortness      asthma related   • ASTHMA      does not take inhaler( cold air and cigaretts)   • Anesthesia      sensitive to medications   • Bronchitis      hx [id number], 2010   • Depression    • Hypertension    • Obesity    • Pneumonia    • Nasal drainage        ROS:  Constitutional: Denies fevers, chills, night sweats, fatigue or weight loss  Eyes: Denies vision loss, pain, drainage, double vision  Ears, Nose, Throat: Denies earache, tinnitus, hoarseness  Cardiovascular: Denies chest pain, tightness, palpitations  Respiratory: See history of present illness  Sleep: Denies, snoring, apnea  GI: Denies abdominal pain, nausea, vomiting, diarrhea  : Denies frequent urination, hematuria, painful  "urination  Musculoskeletal: Denies back pain, painful joints, sore muscles  Neurological: Denies headaches, seizures  Skin: Denies rashes, color changes  Psychiatric: Denies depression or thoughts of suicide  Hematologic: Denies bleeding tendency or clotting tendency  Allergic/Immunologic: Denies rhinitis, skin sensitivity    Social History     Social History   • Marital Status:      Spouse Name: N/A   • Number of Children: N/A   • Years of Education: N/A     Occupational History   • Not on file.     Social History Main Topics   • Smoking status: Never Smoker    • Smokeless tobacco: Never Used      Comment: 1 ppd x 5 years; quit 1973   • Alcohol Use: No   • Drug Use: No   • Sexual Activity: Not on file     Other Topics Concern   • Not on file     Social History Narrative     Codeine; Eggs; Latex; and Pcn  Current Outpatient Prescriptions on File Prior to Visit   Medication Sig Dispense Refill   • acetaminophen (TYLENOL) 325 MG Tab Take 650 mg by mouth every four hours as needed.     • ibuprofen (MOTRIN) 200 MG Tab Take 200 mg by mouth every 6 hours as needed.     • azithromycin (ZITHROMAX) 250 MG Tab Take as directed 6 Tab 0   • MethylPREDNISolone (MEDROL DOSEPAK) 4 MG Tablet Therapy Pack Take as directed 21 Tab 0   • fluconazole (DIFLUCAN) 150 MG tablet Take one tab PO day #1, wait 4 days, if still w/ s/sx take 2nd tab PO 2 Tab 0   • benzonatate (TESSALON) 100 MG Cap Take 1 Cap by mouth 3 times a day as needed for Cough. 60 Cap 0   • fluticasone (FLONASE) 50 MCG/ACT nasal spray Spray 2 Sprays in nose every day. 16 g 0   • meloxicam (MOBIC) 7.5 MG Tab Take 1 Tab by mouth every day. 30 Tab 0     No current facility-administered medications on file prior to visit.     Blood pressure 116/72, pulse 80, temperature 36.6 °C (97.8 °F), resp. rate 16, height 1.6 m (5' 3\"), weight 89.812 kg (198 lb), SpO2 95 %.  Family History   Problem Relation Age of Onset   • Heart Attack Father        Physical Exam:    HEENT: " "PERRLA, EOMI, no scleral icterus, no nasal or oral lesions  Neck: No thyromegaly, no adenopathy, no bruits  Mallampatti: Grade II  Lungs: Equal breath sounds, no wheezes or crackles  Heart: Regular rate and rhythm, no gallops or murmurs  Abdomen: Soft, benign, no organomegaly  Extremities: No clubbing, cyanosis, or edema  Neurologic: Cranial nerve, motor, and sensory exam are normal    1. Mild persistent asthma without complication        Clinically this woman has asthma. PFTs are normal today. She has a history of allergies and is also bothered by nonspecific irritants. She has had multiple episodes of \"bronchitis\".  We spent quite a bit of time explaining the nature of asthma as an inflammatory disease. She seemed to appreciate the discussion.  She is willing to try medications at this time if they help.  We will give her tapering prednisone.  She will then start Symbicort and Singulair.  Because of her sensitivity to albuterol we will prescribe Xopenex.  She is also open to immunotherapy if would help her asthma. We will give her a referral to allergy.  We will see her in follow-up in about 6-8 weeks.    "

## 2017-05-16 ENCOUNTER — OFFICE VISIT (OUTPATIENT)
Dept: MEDICAL GROUP | Facility: PHYSICIAN GROUP | Age: 67
End: 2017-05-16
Payer: MEDICARE

## 2017-05-16 VITALS
HEIGHT: 63 IN | RESPIRATION RATE: 16 BRPM | TEMPERATURE: 99 F | BODY MASS INDEX: 35.26 KG/M2 | SYSTOLIC BLOOD PRESSURE: 120 MMHG | OXYGEN SATURATION: 98 % | HEART RATE: 117 BPM | DIASTOLIC BLOOD PRESSURE: 70 MMHG | WEIGHT: 199 LBS

## 2017-05-16 DIAGNOSIS — Z13.6 SCREENING FOR CARDIOVASCULAR CONDITION: ICD-10-CM

## 2017-05-16 DIAGNOSIS — Z12.31 ENCOUNTER FOR SCREENING MAMMOGRAM FOR MALIGNANT NEOPLASM OF BREAST: ICD-10-CM

## 2017-05-16 DIAGNOSIS — J45.20 MILD INTERMITTENT ASTHMA WITHOUT COMPLICATION: ICD-10-CM

## 2017-05-16 DIAGNOSIS — E66.9 OBESITY (BMI 35.0-39.9 WITHOUT COMORBIDITY): ICD-10-CM

## 2017-05-16 DIAGNOSIS — G89.29 CHRONIC RIGHT SHOULDER PAIN: ICD-10-CM

## 2017-05-16 DIAGNOSIS — M25.511 CHRONIC RIGHT SHOULDER PAIN: ICD-10-CM

## 2017-05-16 PROBLEM — M25.512 CHRONIC PAIN OF BOTH SHOULDERS: Status: ACTIVE | Noted: 2017-05-16

## 2017-05-16 PROCEDURE — 3017F COLORECTAL CA SCREEN DOC REV: CPT | Performed by: FAMILY MEDICINE

## 2017-05-16 PROCEDURE — G8432 DEP SCR NOT DOC, RNG: HCPCS | Performed by: FAMILY MEDICINE

## 2017-05-16 PROCEDURE — G8419 CALC BMI OUT NRM PARAM NOF/U: HCPCS | Performed by: FAMILY MEDICINE

## 2017-05-16 PROCEDURE — 99214 OFFICE O/P EST MOD 30 MIN: CPT | Performed by: FAMILY MEDICINE

## 2017-05-16 PROCEDURE — 4040F PNEUMOC VAC/ADMIN/RCVD: CPT | Mod: 8P | Performed by: FAMILY MEDICINE

## 2017-05-16 PROCEDURE — 1036F TOBACCO NON-USER: CPT | Performed by: FAMILY MEDICINE

## 2017-05-16 PROCEDURE — 1101F PT FALLS ASSESS-DOCD LE1/YR: CPT | Performed by: FAMILY MEDICINE

## 2017-05-16 PROCEDURE — 3014F SCREEN MAMMO DOC REV: CPT | Performed by: FAMILY MEDICINE

## 2017-05-16 NOTE — MR AVS SNAPSHOT
"        Bella Navarro   2017 9:00 AM   Office Visit   MRN: [id number]    Department:  [facility]   Dept Phone:  [phone]    Description:  Female : 1950   Provider:  Mitzy Lawson D.O.           Reason for Visit     Establish Care           Allergies as of 2017     Allergen Noted Reactions    Codeine 2014   Anaphylaxis    Eggs 2014   Diarrhea, Vomiting    GI distress.    Latex 2016   Rash    .    Pcn [Penicillins] 2014   Shortness of Breath      You were diagnosed with     Chronic right shoulder pain   [793204]       Obesity (BMI 35.0-39.9 without comorbidity) ([facility])   [616080]       Mild intermittent asthma without complication   [175079]       Encounter for screening mammogram for malignant neoplasm of breast   [016501]       Screening for cardiovascular condition   [715643]       Changing nevus   [646156]         Vital Signs     Blood Pressure Pulse Temperature Respirations Height Weight    120/70 mmHg 117 37.2 °C (99 °F) 16 1.6 m (5' 2.99\") 90.266 kg (199 lb)    Body Mass Index Oxygen Saturation Smoking Status             35.26 kg/m2 98% Former Smoker         Basic Information     Date Of Birth Sex Race Ethnicity Preferred Language    1950 Female White Non- English      Your appointments     2017  1:00 PM   ANNUAL EXAM PREVENTATIVE with Mitzy Lawson D.O.   [facility]    [address], [address]  [place] [address] [phone]            2017 12:40 PM   Established Patient Pul with A Rotation   [facility] (--)    [address]  [address]  [facility] [id number] [phone]              Problem List              ICD-10-CM Priority Class Noted - Resolved    Mild intermittent asthma without complication J45.20   3/22/2016 - Present    Chronic pain of both shoulders M25.512, G89.29, M25.511   2017 - Present    Obesity (BMI 35.0-39.9 without " comorbidity) ([facility]) E66.9   5/16/2017 - Present      Health Maintenance        Date Due Completion Dates    MAMMOGRAM 1/19/1992 ---    COLONOSCOPY 1/19/2002 ---    IMM ZOSTER VACCINE 1/19/2012 ---    BONE DENSITY 1/19/2017 ---    IMM PNEUMOCOCCAL 65+ (ADULT) LOW/MEDIUM RISK SERIES (1 of 2 - PCV13) 1/19/2017 ---    IMM DTaP/Tdap/Td Vaccine (2 - Td) 8/18/2026 8/18/2016            Current Immunizations     Tdap Vaccine 8/18/2016 10:56 PM      Below and/or attached are the medications your provider expects you to take. Review all of your home medications and newly ordered medications with your provider and/or pharmacist. Follow medication instructions as directed by your provider and/or pharmacist. Please keep your medication list with you and share with your provider. Update the information when medications are discontinued, doses are changed, or new medications (including over-the-counter products) are added; and carry medication information at all times in the event of emergency situations     Allergies:  CODEINE - Anaphylaxis     EGGS - Diarrhea,Vomiting     LATEX - Rash     PCN - Shortness of Breath               Medications  Valid as of: May 16, 2017 - 10:21 AM    Generic Name Brand Name Tablet Size Instructions for use    .                 Medicines prescribed today were sent to:     [id number] [place] - [address]    [facility] [place] [address]    Phone: [phone] Fax: [phone]    Open 24 Hours?: No      Medication refill instructions:       If your prescription bottle indicates you have medication refills left, it is not necessary to call your provider’s office. Please contact your pharmacy and they will refill your medication.    If your prescription bottle indicates you do not have any refills left, you may request refills at any time through one of the following ways: The online [employer] system (except Urgent Care), by calling your provider’s office, or by asking your  pharmacy to contact your provider’s office with a refill request. Medication refills are processed only during regular business hours and may not be available until the next business day. Your provider may request additional information or to have a follow-up visit with you prior to refilling your medication.   *Please Note: Medication refills are assigned a new Rx number when refilled electronically. Your pharmacy may indicate that no refills were authorized even though a new prescription for the same medication is available at the pharmacy. Please request the medicine by name with the pharmacy before contacting your provider for a refill.        Your To Do List     Future Labs/Procedures Complete By Expires    COMP METABOLIC PANEL  As directed 5/16/2018    LIPID PROFILE  As directed 5/16/2018    MA-MAMMO SCREEN BILAT IMPLANTS [place] CAD  As directed 5/16/2018    MR-SHOULDER-W/O RIGHT  As directed 5/16/2018         MyChart Access Code: Activation code not generated  Current [employer] Status: Active

## 2017-05-16 NOTE — PROGRESS NOTES
Chief Complaint   Patient presents with   • Establish Care         Bella Navarro is a 67 y.o. female here as a new pt.  Pt is a former pt of Dr. Fabiola Lewis but has not been seen in >2 yrs      HPI:      Pt reports bilat shoulder pain.  Pt injured L shoulder 2003 at work when struck by a suitcase. Pt reports fray in rotator cuff. Last summer pt tripped on a box and hit her R shoudler.  R hand weakness. No paresthesia. Pt reports pain can be 8/10 delta with     Asthma: SInce age 10. Pt was seen by Pulm, but reported anxiety with Symbicort, Singulair, and Xopenex.  Pt reports she continues to snore nightly.         LMP: no bleeding for >5 yrs.   Mammo: 2015  Pap: abnormal in the past, 2015  Colonoscopy: today      Allergies   Allergen Reactions   • Codeine Anaphylaxis   • Eggs Diarrhea and Vomiting     GI distress.   • Latex Rash     .   • Pcn [Penicillins] Shortness of Breath     Current medicines (including changes today)  No current outpatient prescriptions on file.     No current facility-administered medications for this visit.     She  has a past medical history of Nephrolithiasis (2008); Snoring; ASTHMA; Anesthesia; Bronchitis; Depression; Hypertension; Obesity; Pneumonia; and Bile leak. She also has no past medical history of COPD, CAD (coronary artery disease), Liver disease, or Psychiatric disorder.  She  has past surgical history that includes tonsillectomy; enlarge breast with implant (1996); lynn by laparoscopy (8/4/2014); ercp in or (8/12/2014); ercp (10/8/2014); breast implant removal (Bilateral, 1/15/2016); capsulectomy (Bilateral, 1/15/2016); and mastopexy (Bilateral, 1/15/2016).  Social History   Substance Use Topics   • Smoking status: Former Smoker -- 1.00 packs/day for 5 years     Types: Cigarettes     Start date: 01/01/1968     Quit date: 01/01/1973   • Smokeless tobacco: Never Used      Comment: continue to avoid   • Alcohol Use: No     Social History     Social History Narrative     Family  "History   Problem Relation Age of Onset   • Heart Attack Father      Family Status   Relation Status Death Age   • Mother Alive    • Father         ROS  REVIEW OF SYSTEMS:   GEN: no weight loss, fevers, or chills  HEENT: no blurry vision or change in vision, no ear pain, no difficulty swallowing, no throat pain, no runny nose, no nasal congestion  Resp: no shortness of breath, no cough  CV: no racing heart, no irregular beats, no chest pain  Abd: no nausea, no vomiting, no diarrhea, no constipation, no blood in stool, no dark stools, no incontinence  : no dysuria, no hematuria, no urinary incontinence, no increased frequency  MSK: no muscle aches, bilateral shoulder pain, no limited motion  Neuro: no headaches, no dizziness, no LOC, no weakness, no numbness/tingling     Objective:     Blood pressure 120/70, pulse 117, temperature 37.2 °C (99 °F), resp. rate 16, height 1.6 m (5' 2.99\"), weight 90.266 kg (199 lb), SpO2 98 %. Body mass index is 35.26 kg/(m^2).  Physical Exam:    Constitutional: Alert, no distress.  Skin: Warm, dry, good turgor.  Eye: Equal, round and reactive, conjunctiva clear, lids normal.  ENMT: Lips without lesions, good dentition, oropharynx non-erythematous, no exudate, moist oral mucosa.  Neck: Trachea midline, no masses, no thyromegaly. No cervical or supraclavicular lymphadenopathy. Full ROM  Respiratory: Unlabored respiratory effort, good air movement, lungs clear to auscultation, no wheezes, no ronchi.  Cardiovascular:RRR, +S1, S2, no murmur, no peripheral edema, pedal and radial pulses equal and intact bilat  Abdomen: Soft, non-tender, no masses, no hepatosplenomegaly.  Psych: Alert and oriented x3, appropriate affect and mood.  Neuro: CN2-12 intact, no gross motor or sensory deficits  MSK: 5 out of 5 muscle strength in upper extremity as well as lower extremity, right shoulder limited range of motion flexion: 90°, extension 10°, abduction 45°, internal rotation 10°, external " rotation 5°, negative drop arm      Assessment and Plan:   The following treatment plan was discussed    1. Chronic right shoulder pain  Patient reports has been chronic for years. Patient has done home physical therapy and massage. She is a massage therapist. She reports no improvement with conservative treatment. Therefore recommend MRI due to limited range of motion.  - MR-SHOULDER-W/O RIGHT; Future    2. Obesity (BMI 35.0-39.9 without comorbidity) (HCC)  Pt educated on the increase of morbidity and mortality associated with excess weight including DM, Heart Disease, HTN, stroke, and sleep apnea.  Pt advised weight loss of 5% through reduced calorie, low fat diet and 150 mins of exercise a week  - Patient identified as having weight management issue.  Appropriate orders and counseling given.  - COMP METABOLIC PANEL; Future    3. Mild intermittent asthma without complication  Chronic: currently well controlled      4. Encounter for screening mammogram for malignant neoplasm of breast  - MA-MAMMO SCREEN BILAT IMPLANTS [place] CAD; Future    5. Screening for cardiovascular condition  - LIPID PROFILE; Future      Records requested.  Followup: Return in about 4 weeks (around 6/13/2017) for PAP.

## 2017-06-12 ENCOUNTER — TELEPHONE (OUTPATIENT)
Dept: MEDICAL GROUP | Facility: PHYSICIAN GROUP | Age: 67
End: 2017-06-12

## 2017-06-12 NOTE — TELEPHONE ENCOUNTER
ESTABLISHED PATIENT PRE-VISIT PLANNING     Note: Patient will not be contacted if there is no indication to call.     1.  Reviewed notes from the last few office visits within the medical group: Yes    2.  If any orders were placed at last visit or intended to be done for this visit (i.e. 6 mos follow-up), do we have Results/Consult Notes?        •  Labs - Labs ordered, NOT completed. Patient advised to complete prior to next appointment.       •  Imaging - Imaging ordered, NOT completed. Patient advised to complete prior to next appointment.       •  Referrals - No referrals were ordered at last office visit.    3. Is this appointment scheduled as a Hospital Follow-Up? No    4.  Immunizations were updated in [facility] using [employer]?: Yes       •  [employer] Recommendations: PREVNAR (PCV13)  ZOSTAVAX (Shingles)    5.  Patient is due for the following Health Maintenance Topics:   Health Maintenance Due   Topic Date Due   • Annual Wellness Visit  1950   • MAMMOGRAM  01/19/1992   • IMM ZOSTER VACCINE  01/19/2012   • BONE DENSITY  01/19/2017   • IMM PNEUMOCOCCAL 65+ (ADULT) LOW/MEDIUM RISK SERIES (1 of 2 - PCV13) 01/19/2017       6.  Patient was NOT informed to arrive 15 min prior to their scheduled appointment and bring in their medication bottles.

## 2017-06-23 ENCOUNTER — HOSPITAL ENCOUNTER (OUTPATIENT)
Dept: RADIOLOGY | Facility: MEDICAL CENTER | Age: 67
End: 2017-06-23
Attending: FAMILY MEDICINE
Payer: MEDICARE

## 2017-06-23 ENCOUNTER — HOSPITAL ENCOUNTER (OUTPATIENT)
Dept: LAB | Facility: MEDICAL CENTER | Age: 67
End: 2017-06-23
Attending: FAMILY MEDICINE
Payer: MEDICARE

## 2017-06-23 DIAGNOSIS — Z13.6 SCREENING FOR CARDIOVASCULAR CONDITION: ICD-10-CM

## 2017-06-23 DIAGNOSIS — E66.9 OBESITY (BMI 35.0-39.9 WITHOUT COMORBIDITY): ICD-10-CM

## 2017-06-23 DIAGNOSIS — G89.29 CHRONIC RIGHT SHOULDER PAIN: ICD-10-CM

## 2017-06-23 DIAGNOSIS — M25.511 CHRONIC RIGHT SHOULDER PAIN: ICD-10-CM

## 2017-06-23 LAB
ALBUMIN SERPL BCP-MCNC: 3.9 G/DL (ref 3.2–4.9)
ALBUMIN/GLOB SERPL: 1.3 G/DL
ALP SERPL-CCNC: 84 U/L (ref 30–99)
ALT SERPL-CCNC: 21 U/L (ref 2–50)
ANION GAP SERPL CALC-SCNC: 8 MMOL/L (ref 0–11.9)
AST SERPL-CCNC: 16 U/L (ref 12–45)
BILIRUB SERPL-MCNC: 0.3 MG/DL (ref 0.1–1.5)
BUN SERPL-MCNC: 19 MG/DL (ref 8–22)
CALCIUM SERPL-MCNC: 9.4 MG/DL (ref 8.5–10.5)
CHLORIDE SERPL-SCNC: 106 MMOL/L (ref 96–112)
CHOLEST SERPL-MCNC: 267 MG/DL (ref 100–199)
CO2 SERPL-SCNC: 26 MMOL/L (ref 20–33)
CREAT SERPL-MCNC: 0.9 MG/DL (ref 0.5–1.4)
GFR SERPL CREATININE-BSD FRML MDRD: >60 ML/MIN/1.73 M 2
GLOBULIN SER CALC-MCNC: 3 G/DL (ref 1.9–3.5)
GLUCOSE SERPL-MCNC: 102 MG/DL (ref 65–99)
HDLC SERPL-MCNC: 55 MG/DL
LDLC SERPL CALC-MCNC: 183 MG/DL
POTASSIUM SERPL-SCNC: 4.1 MMOL/L (ref 3.6–5.5)
PROT SERPL-MCNC: 6.9 G/DL (ref 6–8.2)
SODIUM SERPL-SCNC: 140 MMOL/L (ref 135–145)
TRIGL SERPL-MCNC: 145 MG/DL (ref 0–149)

## 2017-06-23 PROCEDURE — 80053 COMPREHEN METABOLIC PANEL: CPT

## 2017-06-23 PROCEDURE — 80061 LIPID PANEL: CPT

## 2017-06-23 PROCEDURE — 36415 COLL VENOUS BLD VENIPUNCTURE: CPT

## 2017-06-23 PROCEDURE — 73221 MRI JOINT UPR EXTREM W/O DYE: CPT | Mod: RT

## 2017-06-24 ENCOUNTER — APPOINTMENT (OUTPATIENT)
Dept: RADIOLOGY | Facility: MEDICAL CENTER | Age: 67
End: 2017-06-24
Attending: FAMILY MEDICINE
Payer: MEDICARE

## 2017-06-26 DIAGNOSIS — M25.511 CHRONIC PAIN OF BOTH SHOULDERS: ICD-10-CM

## 2017-06-26 DIAGNOSIS — M25.512 CHRONIC PAIN OF BOTH SHOULDERS: ICD-10-CM

## 2017-06-26 DIAGNOSIS — G89.29 CHRONIC PAIN OF BOTH SHOULDERS: ICD-10-CM

## 2017-06-30 ENCOUNTER — APPOINTMENT (OUTPATIENT)
Dept: PULMONOLOGY | Facility: HOSPICE | Age: 67
End: 2017-06-30
Payer: MEDICARE

## 2017-08-01 ENCOUNTER — HOSPITAL ENCOUNTER (OUTPATIENT)
Dept: RADIOLOGY | Facility: MEDICAL CENTER | Age: 67
End: 2017-08-01
Attending: FAMILY MEDICINE
Payer: MEDICARE

## 2017-08-01 DIAGNOSIS — Z12.31 ENCOUNTER FOR SCREENING MAMMOGRAM FOR MALIGNANT NEOPLASM OF BREAST: ICD-10-CM

## 2017-08-01 PROCEDURE — 77063 BREAST TOMOSYNTHESIS BI: CPT

## 2017-08-09 ENCOUNTER — HOSPITAL ENCOUNTER (OUTPATIENT)
Dept: RADIOLOGY | Facility: MEDICAL CENTER | Age: 67
End: 2017-08-09
Attending: ORTHOPAEDIC SURGERY
Payer: MEDICARE

## 2017-08-09 DIAGNOSIS — M25.512 LEFT SHOULDER PAIN, UNSPECIFIED CHRONICITY: ICD-10-CM

## 2017-08-09 PROCEDURE — 73221 MRI JOINT UPR EXTREM W/O DYE: CPT | Mod: LT

## 2018-01-25 ENCOUNTER — OFFICE VISIT (OUTPATIENT)
Dept: MEDICAL GROUP | Facility: PHYSICIAN GROUP | Age: 68
End: 2018-01-25
Payer: MEDICARE

## 2018-01-25 VITALS
SYSTOLIC BLOOD PRESSURE: 120 MMHG | DIASTOLIC BLOOD PRESSURE: 88 MMHG | HEIGHT: 63 IN | HEART RATE: 88 BPM | OXYGEN SATURATION: 93 % | BODY MASS INDEX: 35.08 KG/M2 | WEIGHT: 198 LBS | TEMPERATURE: 98.7 F

## 2018-01-25 DIAGNOSIS — Z00.00 HEALTH CARE MAINTENANCE: ICD-10-CM

## 2018-01-25 PROCEDURE — 99999 PR NO CHARGE: CPT | Performed by: PHYSICIAN ASSISTANT

## 2018-01-25 NOTE — PROGRESS NOTES
Patient presented to office today wanting gynecologic exam with pap and complete wellness exam. Explained that we could do well-woman/pap today but would need to schedule Medicare Wellness visit for another day. Before proceeding with pelvic exam, asked patient when last pap was. She responded that it was last January. I was unable to find any record of either a well-woman exam or pap results from 1/2017 or any other date. I inquired if perhaps she had it done at outside clinic, explaining that it's helpful to see previous pap results and that we could have her sign a record release. The patient then became very upset stating that she did not feel comfortable being seen today due to our inability to keep records. She quickly got dressed and left the office.     Total 5 minutes face-to-face time spent with patient, with greater than 50% of the total time discussing patient's issues and symptoms as listed above in assessment and plan, as well as managing coordination of care for future evaluation and treatment.    Kimmie Caballero P.A.-C.

## 2019-01-12 ENCOUNTER — OFFICE VISIT (OUTPATIENT)
Dept: URGENT CARE | Facility: PHYSICIAN GROUP | Age: 69
End: 2019-01-12
Payer: MEDICARE

## 2019-01-12 VITALS
HEART RATE: 119 BPM | WEIGHT: 198.2 LBS | DIASTOLIC BLOOD PRESSURE: 80 MMHG | HEIGHT: 63 IN | OXYGEN SATURATION: 95 % | BODY MASS INDEX: 35.12 KG/M2 | TEMPERATURE: 100.4 F | SYSTOLIC BLOOD PRESSURE: 104 MMHG

## 2019-01-12 DIAGNOSIS — J22 LRTI (LOWER RESPIRATORY TRACT INFECTION): ICD-10-CM

## 2019-01-12 DIAGNOSIS — J10.1 INFLUENZA A: ICD-10-CM

## 2019-01-12 DIAGNOSIS — R68.89 FLU-LIKE SYMPTOMS: ICD-10-CM

## 2019-01-12 DIAGNOSIS — Z86.19 HISTORY OF CANDIDAL VULVOVAGINITIS: ICD-10-CM

## 2019-01-12 LAB
FLUAV+FLUBV AG SPEC QL IA: ABNORMAL
INT CON NEG: ABNORMAL
INT CON POS: ABNORMAL

## 2019-01-12 PROCEDURE — 99214 OFFICE O/P EST MOD 30 MIN: CPT | Performed by: PHYSICIAN ASSISTANT

## 2019-01-12 PROCEDURE — 87804 INFLUENZA ASSAY W/OPTIC: CPT | Performed by: PHYSICIAN ASSISTANT

## 2019-01-12 RX ORDER — AZITHROMYCIN 250 MG/1
TABLET, FILM COATED ORAL
Qty: 6 TAB | Refills: 0 | Status: SHIPPED | OUTPATIENT
Start: 2019-01-12 | End: 2019-02-01

## 2019-01-12 RX ORDER — FLUCONAZOLE 150 MG/1
150 TABLET ORAL ONCE
Qty: 1 TAB | Refills: 0 | Status: SHIPPED | OUTPATIENT
Start: 2019-01-12 | End: 2019-01-12

## 2019-01-12 RX ORDER — OSELTAMIVIR PHOSPHATE 75 MG/1
75 CAPSULE ORAL 2 TIMES DAILY
Qty: 10 CAP | Refills: 0 | Status: SHIPPED | OUTPATIENT
Start: 2019-01-12 | End: 2019-02-01

## 2019-01-12 ASSESSMENT — ENCOUNTER SYMPTOMS
CHILLS: 1
NAUSEA: 0
VOMITING: 0
MYALGIAS: 1
WHEEZING: 0
COUGH: 1
FEVER: 1
SPUTUM PRODUCTION: 1
DIARRHEA: 1
SINUS PAIN: 0
HEADACHES: 1
DIZZINESS: 0
CARDIOVASCULAR NEGATIVE: 1
SORE THROAT: 1
SHORTNESS OF BREATH: 0
ABDOMINAL PAIN: 0

## 2019-01-12 ASSESSMENT — COPD QUESTIONNAIRES: COPD: 0

## 2019-01-12 NOTE — PROGRESS NOTES
Subjective:      Bella Navarro is a 68 y.o. female who presents with Cough (sore thoat, nausea, ears feel pluggeed, x3 days)            Cough   This is a new problem. The current episode started in the past 7 days. The problem has been gradually worsening. The problem occurs every few minutes. The cough is non-productive. Associated symptoms include chills, ear congestion, a fever, headaches, myalgias and a sore throat. Pertinent negatives include no ear pain, nasal congestion, postnasal drip, shortness of breath or wheezing. She has tried nothing for the symptoms. The treatment provided mild relief. Her past medical history is significant for asthma and pneumonia. There is no history of bronchitis or COPD.   Flulike symptoms.  Patient also having shortness of breath, productive cough, wheezing.  Patient is asthmatic with a history of recurrent bronchitis.      PMH:  has a past medical history of Anesthesia; ASTHMA; Bile leak; Bronchitis; Depression; Hypertension; Nephrolithiasis (2008); Obesity; Pneumonia; and Snoring. She also has no past medical history of CAD (coronary artery disease); COPD; Liver disease; or Psychiatric disorder.  MEDS: No current outpatient prescriptions on file.  ALLERGIES:   Allergies   Allergen Reactions   • Codeine Anaphylaxis   • Eggs Diarrhea and Vomiting     GI distress.   • Latex Rash     .   • Pcn [Penicillins] Shortness of Breath     SURGHX:   Past Surgical History:   Procedure Laterality Date   • BREAST IMPLANT REMOVAL Bilateral 1/15/2016    Procedure: BREAST IMPLANT EXCHANGE;  Surgeon: Martinez Martin M.D.;  Location: [facility];  Service:    • CAPSULECTOMY Bilateral 1/15/2016    Procedure: CAPSULECTOMY AND CAPSULORRHAPHIES;  Surgeon: Martinez Martin M.D.;  Location: [facility];  Service:    • MASTOPEXY Bilateral 1/15/2016    Procedure: MASTOPEXY BENELLI;  Surgeon: Martinez Martin M.D.;  Location: [facility];  Service:    • ERCP  " 10/8/2014    Performed by Martinez Siegel M.D. at SURGERY [facility]   • ERCP IN OR  8/12/2014    Performed by Martinez Siegel M.D. at SURGERY [facility]   • DEEPALI BY LAPAROSCOPY  8/4/2014    Performed by [place] Salas M.D. at SURGERY [facility]   • PB ENLARGE BREAST WITH IMPLANT  1996   • TONSILLECTOMY       SOCHX:  reports that she quit smoking about 46 years ago. Her smoking use included Cigarettes. She started smoking about 51 years ago. She has a 5.00 pack-year smoking history. She has never used smokeless tobacco. She reports that she does not drink alcohol or use drugs.  FH: family history includes Heart Attack in her father.    Review of Systems   Constitutional: Positive for chills, fever and malaise/fatigue.   HENT: Positive for congestion and sore throat. Negative for ear pain, postnasal drip and sinus pain.    Respiratory: Positive for cough and sputum production. Negative for shortness of breath and wheezing.    Cardiovascular: Negative.    Gastrointestinal: Positive for diarrhea. Negative for abdominal pain, nausea and vomiting.   Genitourinary: Negative.    Musculoskeletal: Positive for myalgias. Negative for joint pain.   Neurological: Positive for headaches. Negative for dizziness.       Medications, Allergies, and current problem list reviewed today in Epic     Objective:     /80 (BP Location: Left arm, Patient Position: Sitting, BP Cuff Size: Large adult)   Pulse (!) 119   Temp 38 °C (100.4 °F) (Temporal)   Ht 1.6 m (5' 2.99\")   Wt 89.9 kg (198 lb 3.2 oz)   SpO2 95%   BMI 35.12 kg/m²      Physical Exam   Constitutional: She is oriented to person, place, and time. She appears well-developed and well-nourished. No distress.   HENT:   Head: Normocephalic and atraumatic.   Right Ear: Tympanic membrane and external ear normal.   Left Ear: Tympanic membrane and external ear normal.   Nose: Nose normal.   Mouth/Throat: Oropharynx is clear and moist. No oropharyngeal " exudate.   Eyes: Pupils are equal, round, and reactive to light. Conjunctivae and EOM are normal. Right eye exhibits no discharge. Left eye exhibits no discharge.   Neck: Normal range of motion. Neck supple.   Cardiovascular: Normal rate, regular rhythm and normal heart sounds.    Pulmonary/Chest: Effort normal. No respiratory distress. She has decreased breath sounds. She has wheezes. She has no rhonchi. She has no rales.   Musculoskeletal: Normal range of motion.   Lymphadenopathy:     She has no cervical adenopathy.   Neurological: She is alert and oriented to person, place, and time.   Skin: Skin is warm and dry. She is not diaphoretic.   Psychiatric: She has a normal mood and affect. Her behavior is normal. Judgment and thought content normal.   Nursing note and vitals reviewed.              Assessment/Plan:     1. Flu-like symptoms  POCT Influenza A/B   2. Influenza A  oseltamivir (TAMIFLU) 75 MG Cap   3. LRTI (lower respiratory tract infection)  azithromycin (ZITHROMAX) 250 MG Tab   4. History of candidal vulvovaginitis  fluconazole (DIFLUCAN) 150 MG tablet     Rapid flu: Positive influenza A    Flulike symptoms.  Patient also having productive cough some shortness of breath and wheezing.  Exam shows decreased breath sounds and scattered wheezes.  No rhonchi or rales.  PO2 adequate.  History of asthma and bronchitis.  I will also treat with an antibiotic to cover for secondary bacterial infection at this time.  Fluids and rest  OTC meds and conservative measures as discussed  Return to clinic or go to ED if symptoms worsen or persist. Indications for ED discussed at length. Patient voices understanding. Follow-up with your primary care provider in 3-5 days. Red flags discussed. All side effects of medication discussed including allergic response, GI upset, tendon injury, etc.    Please note that this dictation was created using voice recognition software. I have made every reasonable attempt to correct  obvious errors, but I expect that there are errors of grammar and possibly content that I did not discover before finalizing the note.

## 2019-02-01 DIAGNOSIS — Z01.812 PRE-OPERATIVE LABORATORY EXAMINATION: ICD-10-CM

## 2019-02-01 DIAGNOSIS — Z01.810 PRE-OPERATIVE CARDIOVASCULAR EXAMINATION: ICD-10-CM

## 2019-02-01 LAB
ANION GAP SERPL CALC-SCNC: 10 MMOL/L (ref 0–11.9)
BUN SERPL-MCNC: 14 MG/DL (ref 8–22)
CALCIUM SERPL-MCNC: 9.3 MG/DL (ref 8.5–10.5)
CHLORIDE SERPL-SCNC: 109 MMOL/L (ref 96–112)
CO2 SERPL-SCNC: 20 MMOL/L (ref 20–33)
CREAT SERPL-MCNC: 0.72 MG/DL (ref 0.5–1.4)
EKG IMPRESSION: NORMAL
EST. AVERAGE GLUCOSE BLD GHB EST-MCNC: 131 MG/DL
GLUCOSE SERPL-MCNC: 101 MG/DL (ref 65–99)
HBA1C MFR BLD: 6.2 % (ref 0–5.6)
POTASSIUM SERPL-SCNC: 4.2 MMOL/L (ref 3.6–5.5)
SODIUM SERPL-SCNC: 139 MMOL/L (ref 135–145)

## 2019-02-01 PROCEDURE — 93010 ELECTROCARDIOGRAM REPORT: CPT | Performed by: INTERNAL MEDICINE

## 2019-02-01 PROCEDURE — 93005 ELECTROCARDIOGRAM TRACING: CPT

## 2019-02-01 PROCEDURE — 36415 COLL VENOUS BLD VENIPUNCTURE: CPT

## 2019-02-01 PROCEDURE — 83036 HEMOGLOBIN GLYCOSYLATED A1C: CPT

## 2019-02-01 PROCEDURE — 80048 BASIC METABOLIC PNL TOTAL CA: CPT

## 2019-02-01 NOTE — OR NURSING
"Discussed \"Preparing for your procedure\". Instructed to be NPO after midnight. Takes no medication.  "

## 2019-02-05 ENCOUNTER — HOSPITAL ENCOUNTER (OUTPATIENT)
Facility: MEDICAL CENTER | Age: 69
End: 2019-02-05
Attending: SPECIALIST | Admitting: SPECIALIST
Payer: MEDICARE

## 2019-02-05 VITALS
HEIGHT: 64 IN | HEART RATE: 89 BPM | DIASTOLIC BLOOD PRESSURE: 82 MMHG | RESPIRATION RATE: 16 BRPM | TEMPERATURE: 98.4 F | SYSTOLIC BLOOD PRESSURE: 128 MMHG | OXYGEN SATURATION: 93 % | BODY MASS INDEX: 33.95 KG/M2 | WEIGHT: 198.85 LBS

## 2019-02-05 PROBLEM — C44.301 SKIN CANCER OF NOSE: Status: ACTIVE | Noted: 2019-02-05

## 2019-02-05 LAB
GLUCOSE BLD-MCNC: 95 MG/DL (ref 65–99)
PATHOLOGY CONSULT NOTE: NORMAL

## 2019-02-05 PROCEDURE — 700101 HCHG RX REV CODE 250

## 2019-02-05 PROCEDURE — 160009 HCHG ANES TIME/MIN: Performed by: SPECIALIST

## 2019-02-05 PROCEDURE — 160035 HCHG PACU - 1ST 60 MINS PHASE I: Performed by: SPECIALIST

## 2019-02-05 PROCEDURE — 82962 GLUCOSE BLOOD TEST: CPT

## 2019-02-05 PROCEDURE — 160025 RECOVERY II MINUTES (STATS): Performed by: SPECIALIST

## 2019-02-05 PROCEDURE — 160002 HCHG RECOVERY MINUTES (STAT): Performed by: SPECIALIST

## 2019-02-05 PROCEDURE — 88331 PATH CONSLTJ SURG 1 BLK 1SPC: CPT

## 2019-02-05 PROCEDURE — 501838 HCHG SUTURE GENERAL: Performed by: SPECIALIST

## 2019-02-05 PROCEDURE — 160048 HCHG OR STATISTICAL LEVEL 1-5: Performed by: SPECIALIST

## 2019-02-05 PROCEDURE — 88305 TISSUE EXAM BY PATHOLOGIST: CPT

## 2019-02-05 PROCEDURE — 160046 HCHG PACU - 1ST 60 MINS PHASE II: Performed by: SPECIALIST

## 2019-02-05 PROCEDURE — 700111 HCHG RX REV CODE 636 W/ 250 OVERRIDE (IP)

## 2019-02-05 PROCEDURE — 160028 HCHG SURGERY MINUTES - 1ST 30 MINS LEVEL 3: Performed by: SPECIALIST

## 2019-02-05 PROCEDURE — 160039 HCHG SURGERY MINUTES - EA ADDL 1 MIN LEVEL 3: Performed by: SPECIALIST

## 2019-02-05 PROCEDURE — 160036 HCHG PACU - EA ADDL 30 MINS PHASE I: Performed by: SPECIALIST

## 2019-02-05 RX ORDER — HALOPERIDOL 5 MG/ML
1 INJECTION INTRAMUSCULAR
Status: DISCONTINUED | OUTPATIENT
Start: 2019-02-05 | End: 2019-02-05 | Stop reason: HOSPADM

## 2019-02-05 RX ORDER — DIPHENHYDRAMINE HYDROCHLORIDE 50 MG/ML
12.5 INJECTION INTRAMUSCULAR; INTRAVENOUS
Status: DISCONTINUED | OUTPATIENT
Start: 2019-02-05 | End: 2019-02-05 | Stop reason: HOSPADM

## 2019-02-05 RX ORDER — MIDAZOLAM HYDROCHLORIDE 1 MG/ML
1 INJECTION INTRAMUSCULAR; INTRAVENOUS
Status: DISCONTINUED | OUTPATIENT
Start: 2019-02-05 | End: 2019-02-05 | Stop reason: HOSPADM

## 2019-02-05 RX ORDER — METOPROLOL TARTRATE 1 MG/ML
1 INJECTION, SOLUTION INTRAVENOUS
Status: DISCONTINUED | OUTPATIENT
Start: 2019-02-05 | End: 2019-02-05 | Stop reason: HOSPADM

## 2019-02-05 RX ORDER — SODIUM CHLORIDE, SODIUM LACTATE, POTASSIUM CHLORIDE, CALCIUM CHLORIDE 600; 310; 30; 20 MG/100ML; MG/100ML; MG/100ML; MG/100ML
INJECTION, SOLUTION INTRAVENOUS ONCE
Status: COMPLETED | OUTPATIENT
Start: 2019-02-05 | End: 2019-02-05

## 2019-02-05 RX ORDER — HYDROMORPHONE HYDROCHLORIDE 1 MG/ML
0.2 INJECTION, SOLUTION INTRAMUSCULAR; INTRAVENOUS; SUBCUTANEOUS
Status: DISCONTINUED | OUTPATIENT
Start: 2019-02-05 | End: 2019-02-05 | Stop reason: HOSPADM

## 2019-02-05 RX ORDER — HYDROMORPHONE HYDROCHLORIDE 1 MG/ML
0.4 INJECTION, SOLUTION INTRAMUSCULAR; INTRAVENOUS; SUBCUTANEOUS
Status: DISCONTINUED | OUTPATIENT
Start: 2019-02-05 | End: 2019-02-05 | Stop reason: HOSPADM

## 2019-02-05 RX ORDER — LIDOCAINE HYDROCHLORIDE 10 MG/ML
INJECTION, SOLUTION INFILTRATION; PERINEURAL
Status: DISCONTINUED
Start: 2019-02-05 | End: 2019-02-05 | Stop reason: HOSPADM

## 2019-02-05 RX ORDER — HYDROMORPHONE HYDROCHLORIDE 1 MG/ML
0.1 INJECTION, SOLUTION INTRAMUSCULAR; INTRAVENOUS; SUBCUTANEOUS
Status: DISCONTINUED | OUTPATIENT
Start: 2019-02-05 | End: 2019-02-05 | Stop reason: HOSPADM

## 2019-02-05 RX ORDER — SODIUM CHLORIDE, SODIUM LACTATE, POTASSIUM CHLORIDE, CALCIUM CHLORIDE 600; 310; 30; 20 MG/100ML; MG/100ML; MG/100ML; MG/100ML
INJECTION, SOLUTION INTRAVENOUS CONTINUOUS
Status: DISCONTINUED | OUTPATIENT
Start: 2019-02-05 | End: 2019-02-05 | Stop reason: HOSPADM

## 2019-02-05 RX ORDER — MEPERIDINE HYDROCHLORIDE 25 MG/ML
12.5 INJECTION INTRAMUSCULAR; INTRAVENOUS; SUBCUTANEOUS
Status: DISCONTINUED | OUTPATIENT
Start: 2019-02-05 | End: 2019-02-05 | Stop reason: HOSPADM

## 2019-02-05 RX ORDER — ONDANSETRON 2 MG/ML
4 INJECTION INTRAMUSCULAR; INTRAVENOUS
Status: DISCONTINUED | OUTPATIENT
Start: 2019-02-05 | End: 2019-02-05 | Stop reason: HOSPADM

## 2019-02-05 RX ORDER — BUPIVACAINE HYDROCHLORIDE AND EPINEPHRINE 2.5; 5 MG/ML; UG/ML
INJECTION, SOLUTION EPIDURAL; INFILTRATION; INTRACAUDAL; PERINEURAL
Status: DISCONTINUED | OUTPATIENT
Start: 2019-02-05 | End: 2019-02-05 | Stop reason: HOSPADM

## 2019-02-05 RX ORDER — OXYCODONE HYDROCHLORIDE AND ACETAMINOPHEN 5; 325 MG/1; MG/1
1 TABLET ORAL
Status: DISCONTINUED | OUTPATIENT
Start: 2019-02-05 | End: 2019-02-05 | Stop reason: HOSPADM

## 2019-02-05 RX ORDER — OXYCODONE HYDROCHLORIDE AND ACETAMINOPHEN 5; 325 MG/1; MG/1
2 TABLET ORAL
Status: DISCONTINUED | OUTPATIENT
Start: 2019-02-05 | End: 2019-02-05 | Stop reason: HOSPADM

## 2019-02-05 RX ADMIN — SODIUM CHLORIDE, SODIUM LACTATE, POTASSIUM CHLORIDE, CALCIUM CHLORIDE: 600; 310; 30; 20 INJECTION, SOLUTION INTRAVENOUS at 10:22

## 2019-02-05 RX ADMIN — Medication 0.5 ML: at 10:22

## 2019-02-05 NOTE — OR SURGEON
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    Anesthesiologist/Type of Anesthesia:  Anesthesiologist: Arpan Srinivasan M.D./General    Surgical Staff:  Circulator: Lucia Elias R.N.  Relief Circulator: Gina Michelle R.N.  Scrub Person: Alejandro Bermeo    Specimens removed if any:  ID Type Source Tests Collected by Time Destination   A : right Tissue Nose PATHOLOGY SPECIMEN, FROZEN SECTION REQUEST Martinez Martin M.D. 2/5/2019 11:48 AM        Estimated Blood Loss: 20 cc    Findings:     Complications: none        2/5/2019 12:52 PM Martinez Martin M.D.

## 2019-02-05 NOTE — OP REPORT
DATE OF SERVICE:  02/05/2019    PREOPERATIVE DIAGNOSIS:  Squamous cell carcinoma, right lateral nose.    POSTOPERATIVE DIAGNOSIS:  Squamous cell carcinoma, right lateral nose.    OPERATIVE PROCEDURE:  1.  Reexcision of squamous cell carcinoma, right upper lateral nose, 2 cm.  2.  Complex closure 2 cm right lateral nose.    SURGEON:  Martinez Martin MD    ANESTHESIOLOGIST:  Arpan Srinivasan MD    ANESTHESIA:  General endotracheal tube.    COMPLICATIONS:  None.    ESTIMATED BLOOD LOSS:  Less than 20 mL    INDICATIONS:  The patient is a 69-year-old female who has a biopsy-proven   squamous cell carcinoma of the right upper lateral nose.  She is here for   excision for margins.  Pathology standby, risks and benefits of the procedure   have been explained in full to the patient.  She understands and wished to   proceed.    FINDINGS:  Frozen section showed no residual carcinoma, essentially clear   margins.    DESCRIPTION OF PROCEDURE:  The patient was preoperatively marked in the   holding room in sitting position.  She was taken to operating theater where   general anesthesia was induced.  A 2 g Ancef were given prior to starting the   procedure.  Initially, I drew an elliptical incision around our previous   biopsy site.  I then infiltrated 0.25% Marcaine with epinephrine, excised   around the area where the previous cancer was to the deeper margin to make   sure we got all the deep carcinoma.  Pathology with standby, I took the   specimen and did a frozen on it.  She did not see any obvious squamous cell   carcinoma left in the specimen.  Area was irrigated and then I elevated the   soft tissue up and off of the nasal dorsum on the right side, gaining, went   for closure, used 5-0 Vicryls to close the deep dermal layer.  Care was taken   not to pull the lateral skin near the canthus to prevent distortion.  I did   rotate the skin on the nose in that lateral direction to assist in closure,   5-0 Vicryls were used in  the deep layers and then I used a 6-0 Prolene   interrupted suture in the skin for closure.  Steri-Strips were applied.    Patient was returned to the PACU in stable condition.  She tolerated the   procedure well.       ____________________________________     MD RAMOS POST / LEIGH ANN    DD:  02/05/2019 13:01:41  DT:  02/05/2019 13:22:41    D#:  [id number]  Job#:  [id number]

## 2019-02-05 NOTE — PROGRESS NOTES
Med rec completed per pt.   Antibiotics within last 30 days: No  Patient allergies have been reviewed: Yes  Comments: Pt completed 7 days of Tamiflu on 1/17/19

## 2019-02-05 NOTE — DISCHARGE INSTRUCTIONS
ACTIVITY: Rest and take it easy for the first 24 hours.  A responsible adult is recommended to remain with you during that time.  It is normal to feel sleepy.  We encourage you to not do anything that requires balance, judgment or coordination.    MILD FLU-LIKE SYMPTOMS ARE NORMAL. YOU MAY EXPERIENCE GENERALIZED MUSCLE ACHES, THROAT IRRITATION, HEADACHE AND/OR SOME NAUSEA.    FOR 24 HOURS DO NOT:  Drive, operate machinery or run household appliances.  Drink beer or alcoholic beverages.   Make important decisions or sign legal documents.    SPECIAL INSTRUCTIONS: *Elevate your head at least 30 degrees.**    DIET: To avoid nausea, slowly advance diet as tolerated, avoiding spicy or greasy foods for the first day.  Add more substantial food to your diet according to your physician's instructions.  Babies can be fed formula or breast milk as soon as they are hungry.  INCREASE FLUIDS AND FIBER TO AVOID CONSTIPATION.    SURGICAL DRESSING/BATHING: **May shower in 48 hours.*    FOLLOW-UP APPOINTMENT:  A follow-up appointment should be arranged with your doctor;  call to schedule.    You should CALL YOUR PHYSICIAN if you develop:  Fever greater than 101 degrees F.  Pain not relieved by medication, or persistent nausea or vomiting.  Excessive bleeding (blood soaking through dressing) or unexpected drainage from the wound.  Extreme redness or swelling around the incision site, drainage of pus or foul smelling drainage.  Inability to urinate or empty your bladder within 8 hours.  Problems with breathing or chest pain.    You should call 911 if you develop problems with breathing or chest pain.  If you are unable to contact your doctor or surgical center, you should go to the nearest emergency room or urgent care center.  Physician's telephone #: *[phone]**Dr Martin    If any questions arise, call your doctor.  If your doctor is not available, please feel free to call the Surgical Center at [phone].  The Center is open  Monday through Friday from 7AM to 7PM.  You can also call the HEALTH HOTLINE open 24 hours/day, 7 days/week and speak to a nurse at [phone], or toll free at [phone].    A registered nurse may call you a few days after your surgery to see how you are doing after your procedure.    MEDICATIONS: Resume taking daily medication.  Take prescribed pain medication with food.  If no medication is prescribed, you may take non-aspirin pain medication if needed.  PAIN MEDICATION CAN BE VERY CONSTIPATING.  Take a stool softener or laxative such as senokot, pericolace, or milk of magnesia if needed.    Prescription given for N/A.  Last pain medication given at N/A.    If your physician has prescribed pain medication that includes Acetaminophen (Tylenol), do not take additional Acetaminophen (Tylenol) while taking the prescribed medication.    Depression / Suicide Risk    As you are discharged from this [facility] facility, it is important to learn how to keep safe from harming yourself.    Recognize the warning signs:  · Abrupt changes in personality, positive or negative- including increase in energy   · Giving away possessions  · Change in eating patterns- significant weight changes-  positive or negative  · Change in sleeping patterns- unable to sleep or sleeping all the time   · Unwillingness or inability to communicate  · Depression  · Unusual sadness, discouragement and loneliness  · Talk of wanting to die  · Neglect of personal appearance   · Rebelliousness- reckless behavior  · Withdrawal from people/activities they love  · Confusion- inability to concentrate     If you or a loved one observes any of these behaviors or has concerns about self-harm, here's what you can do:  · Talk about it- your feelings and reasons for harming yourself  · Remove any means that you might use to hurt yourself (examples: pills, rope, extension cords, firearm)  · Get professional help from the community (Mental Health,  Substance Abuse, psychological counseling)  · Do not be alone:Call your Safe Contact- someone whom you trust who will be there for you.  · Call your local CRISIS HOTLINE 605-4409 or 748-073-1398  · Call your local Children's Mobile Crisis Response Team [place] (676) 097-9387 or www.[employer] d/[employer]  · Call the toll free National Suicide Prevention Hotlines   · National Suicide Prevention Lifeline 455-261-YFJZ (0137)  · National Hope Line Network 800-SUICIDE (178-5955)

## 2019-02-05 NOTE — OR NURSING
Patient to preop, allergies and NPO status verified, home medications reconciled, belongings secured, verbalizes understanding of pain scale, surgical site verified, IV access established, SCDs/ KELLE hose in place, triple aim refused, chlorhexidine wipes are only step completed

## 2019-02-05 NOTE — OR NURSING
Respirations easy in PACU. Steri-strips clean and dry to right side of upper nose. Patient denies pain and nausea.  HOB elevated.

## 2019-02-05 NOTE — OR NURSING
1352: To stage ll. Up to chair and dressed w/ CNA assist. Denies pain, has slight nausea. Given QueaseEase.  1410: Nausea has subsided.  1418: Home care instructions reviewed w/ pt and . Questions, concerns addressed. Meets criteria for discharge.

## 2019-02-05 NOTE — OR NURSING
1325- report received from Elly NAVARRO.  Pt resting comfortably, denies pain/nausea.  Steri strips to R Upper nose cdi. HOB elevated.  1449-Report to Peterson NAVARRO, pt meets stage 2 criteria.

## 2019-02-20 ENCOUNTER — APPOINTMENT (OUTPATIENT)
Dept: RADIOLOGY | Facility: IMAGING CENTER | Age: 69
End: 2019-02-20
Attending: PHYSICIAN ASSISTANT
Payer: MEDICARE

## 2019-02-20 ENCOUNTER — OFFICE VISIT (OUTPATIENT)
Dept: URGENT CARE | Facility: PHYSICIAN GROUP | Age: 69
End: 2019-02-20
Payer: MEDICARE

## 2019-02-20 VITALS
RESPIRATION RATE: 16 BRPM | BODY MASS INDEX: 33.63 KG/M2 | SYSTOLIC BLOOD PRESSURE: 124 MMHG | TEMPERATURE: 99.2 F | DIASTOLIC BLOOD PRESSURE: 76 MMHG | WEIGHT: 197 LBS | HEART RATE: 116 BPM | HEIGHT: 64 IN | OXYGEN SATURATION: 94 %

## 2019-02-20 DIAGNOSIS — R05.9 COUGH: ICD-10-CM

## 2019-02-20 DIAGNOSIS — Z86.19 HX OF CANDIDAL VULVOVAGINITIS: ICD-10-CM

## 2019-02-20 DIAGNOSIS — R50.9 FEVER, UNSPECIFIED FEVER CAUSE: ICD-10-CM

## 2019-02-20 PROCEDURE — 99214 OFFICE O/P EST MOD 30 MIN: CPT | Performed by: PHYSICIAN ASSISTANT

## 2019-02-20 PROCEDURE — 71046 X-RAY EXAM CHEST 2 VIEWS: CPT | Mod: TC | Performed by: PHYSICIAN ASSISTANT

## 2019-02-20 RX ORDER — DOXYCYCLINE HYCLATE 100 MG
100 TABLET ORAL 2 TIMES DAILY
Qty: 20 TAB | Refills: 0 | Status: SHIPPED | OUTPATIENT
Start: 2019-02-20 | End: 2019-04-11

## 2019-02-20 RX ORDER — FLUCONAZOLE 150 MG/1
TABLET ORAL
Qty: 2 TAB | Refills: 1 | Status: SHIPPED | OUTPATIENT
Start: 2019-02-20 | End: 2019-04-11

## 2019-02-20 RX ORDER — ALBUTEROL SULFATE 90 UG/1
2 AEROSOL, METERED RESPIRATORY (INHALATION) EVERY 6 HOURS PRN
Qty: 8.5 G | Refills: 0 | Status: SHIPPED | OUTPATIENT
Start: 2019-02-20 | End: 2019-04-11

## 2019-02-20 NOTE — PROGRESS NOTES
Chief Complaint   Patient presents with   • Cough     fever, ear pain, headache, body aches X 3 days        HISTORY OF PRESENT ILLNESS: Patient is a 69 y.o. female who presents today for 3 days of body aches, tactile fevers, ear pain, congestion and coughing. Patient had flu in January and states that she never really got over the cough.   The cough is the worst and most concerning symptom to her.  She does have hx of asthma but seems to be more allergy and cold induced. Has not had overt SOB, chest tightness and denies any chest pain with breathing.       Patient Active Problem List    Diagnosis Date Noted   • Skin cancer of nose 02/05/2019   • Chronic pain of both shoulders 05/16/2017   • Obesity (BMI 35.0-39.9 without comorbidity) 05/16/2017   • Mild intermittent asthma without complication 03/22/2016       Allergies:Codeine; Eggs; Latex; Pcn [penicillins]; and Tape    Current Outpatient Prescriptions Ordered in [facility]   Medication Sig Dispense Refill   • GARLIC PO Take 4 Tabs by mouth 2 Times a Day.     • Non Formulary Request Take 1 Tab by mouth every day. **CHINESE HERBS**       No current [facility]-ordered facility-administered medications on file.        Past Medical History:   Diagnosis Date   • Anesthesia     sensitive to medications   • Arthritis 02/2019    R thumb   • ASTHMA     does not take inhaler( cold air and cigaretts)   • Bile leak    • Bronchitis     hx [id number], 2010, 2019   • Cancer ([facility]) 2018    skin   • Cold 01/10/2019   • Diabetes ([facility])     diet   • Hypertension    • Infectious disease 02/01/2019    flu   • Nephrolithiasis 2008   • Obesity    • Pain     bilat. shoulders   • Pneumonia    • Snoring        Social History   Substance Use Topics   • Smoking status: Former Smoker     Packs/day: 1.00     Years: 5.00     Types: Cigarettes     Start date: 1/1/1968     Quit date: 1/1/1973   • Smokeless tobacco: Never Used      Comment: continue to avoid   • Alcohol use No       Family Status   Relation  "Status   • Mo Alive   • Fa      Family History   Problem Relation Age of Onset   • Heart Attack Father        ROS:  Review of Systems   Constitutional: SEE HPI  HENT: SEE HPI  Eyes: Negative for blurred vision.   Respiratory: SEE HPI  Cardiovascular: Negative for chest pain, palpitations, orthopnea and leg swelling.   Gastrointestinal: Negative for heartburn, nausea, vomiting and abdominal pain.     Exam:  Blood pressure 124/76, pulse (!) 116, temperature 37.3 °C (99.2 °F), resp. rate 16, height 1.626 m (5' 4\"), weight 89.4 kg (197 lb), SpO2 94 %, not currently breastfeeding.  General:  Well nourished, well developed female in NAD  Eyes: PERRLA, EOM within normal limits, no conjunctival injection, no scleral icterus, visual fields and acuity grossly intact.  Ears: Normal shape and symmetry, no tenderness, no discharge. External canals are without any significant edema or erythema. Tympanic membranes are without any inflammation, no effusion. Gross auditory acuity is intact  Nose: Symmetrical, sinuses without tenderness, clear rhinorrhea.   Mouth: reasonable hygiene, no erythema exudates or tonsillar enlargement.  Neck: no masses, range of motion within normal limits, no tracheal deviation. No lymphadenopathy  Pulmonary: Normal respiratory effort, no wheezes, crackles, or rhonchi.  Cardiovascular: regular rate and rhythm without murmurs, rubs, or gallops.  Skin: No visible rashes or lesion. Warm, pink, dry.   Extremities: no clubbing, cyanosis, or edema.  Neuro: A&O x 3. Speech normal/clear.  Normal gait.       RAD       Impression       No acute cardiopulmonary process is identified.   Reading Provider Reading Date   Kassy Gan M.D. 2019       Assessment/Plan:  1. Cough  DX-CHEST-2 VIEWS    doxycycline (VIBRAMYCIN) 100 MG Tab    albuterol 108 (90 Base) MCG/ACT Aero Soln inhalation aerosol   2. Fever, unspecified fever cause     3. Hx of candidal vulvovaginitis  fluconazole (DIFLUCAN) 150 MG " tablet         -RAD negative.   -renewed rescue inhaler to have on hand  -humidifier/steamy showers  -rx as above.   -hx of vulvovaginal candidiasis, rx as above.   -RTC precautions.        Supportive care, differential diagnoses, and indications for immediate follow-up discussed with patient.   Pathogenesis of diagnosis discussed including typical length and natural progression.   Instructed to return to clinic or nearest emergency department for any change in condition, further concerns, or worsening of symptoms.        Bev Eugene P.A.-C.

## 2019-04-11 ENCOUNTER — OFFICE VISIT (OUTPATIENT)
Dept: URGENT CARE | Facility: PHYSICIAN GROUP | Age: 69
End: 2019-04-11
Payer: MEDICARE

## 2019-04-11 VITALS
SYSTOLIC BLOOD PRESSURE: 136 MMHG | RESPIRATION RATE: 16 BRPM | TEMPERATURE: 98.6 F | DIASTOLIC BLOOD PRESSURE: 72 MMHG | WEIGHT: 202 LBS | OXYGEN SATURATION: 92 % | HEART RATE: 94 BPM | BODY MASS INDEX: 34.49 KG/M2 | HEIGHT: 64 IN

## 2019-04-11 DIAGNOSIS — R05.9 COUGH: ICD-10-CM

## 2019-04-11 DIAGNOSIS — J45.41 MODERATE PERSISTENT ASTHMA WITH EXACERBATION: ICD-10-CM

## 2019-04-11 LAB
FLUAV+FLUBV AG SPEC QL IA: NEGATIVE
INT CON NEG: NEGATIVE
INT CON POS: POSITIVE

## 2019-04-11 PROCEDURE — 99214 OFFICE O/P EST MOD 30 MIN: CPT | Mod: 25 | Performed by: NURSE PRACTITIONER

## 2019-04-11 PROCEDURE — 87804 INFLUENZA ASSAY W/OPTIC: CPT | Performed by: NURSE PRACTITIONER

## 2019-04-11 PROCEDURE — 94760 N-INVAS EAR/PLS OXIMETRY 1: CPT | Performed by: NURSE PRACTITIONER

## 2019-04-11 RX ORDER — METHYLPREDNISOLONE 4 MG/1
4 TABLET ORAL DAILY
Qty: 1 KIT | Refills: 0 | Status: SHIPPED | OUTPATIENT
Start: 2019-04-11 | End: 2019-09-07

## 2019-04-11 RX ORDER — IPRATROPIUM BROMIDE AND ALBUTEROL SULFATE 2.5; .5 MG/3ML; MG/3ML
3 SOLUTION RESPIRATORY (INHALATION) ONCE
Status: COMPLETED | OUTPATIENT
Start: 2019-04-11 | End: 2019-04-11

## 2019-04-11 RX ORDER — ALBUTEROL SULFATE 90 UG/1
2 AEROSOL, METERED RESPIRATORY (INHALATION) EVERY 6 HOURS PRN
Qty: 8.5 G | Refills: 0 | Status: SHIPPED | OUTPATIENT
Start: 2019-04-11 | End: 2022-09-16 | Stop reason: SDUPTHER

## 2019-04-11 RX ADMIN — IPRATROPIUM BROMIDE AND ALBUTEROL SULFATE 3 ML: 2.5; .5 SOLUTION RESPIRATORY (INHALATION) at 09:18

## 2019-04-11 ASSESSMENT — ENCOUNTER SYMPTOMS
SINUS PAIN: 0
DIZZINESS: 0
VOMITING: 0
PND: 0
RHINORRHEA: 1
TROUBLE SWALLOWING: 0
SPUTUM PRODUCTION: 1
NAUSEA: 0
WHEEZING: 1
CHILLS: 0
SORE THROAT: 0
MYALGIAS: 1
HEADACHES: 0
COUGH: 1
CHEST TIGHTNESS: 1
DIFFICULTY BREATHING: 0
FEVER: 1
EYE PAIN: 0
SHORTNESS OF BREATH: 1
WEIGHT LOSS: 0

## 2019-04-11 NOTE — PROGRESS NOTES
"Subjective:   Bella Navarro is a 69 y.o. female who presents for Cough (has asthma, wheezing, X 2 day )        Asthma   She complains of chest tightness, cough, shortness of breath, sputum production and wheezing. There is no difficulty breathing. This is a new problem. Episode onset: 2 days. The problem occurs constantly. The problem has been unchanged. The cough is productive of sputum. Associated symptoms include a fever, malaise/fatigue, myalgias and rhinorrhea. Pertinent negatives include no chest pain, headaches, PND, postnasal drip, sore throat, trouble swallowing or weight loss. Her symptoms are aggravated by nothing. Her symptoms are alleviated by nothing. She reports no improvement on treatment. Her symptoms are not alleviated by beta-agonist. There are no known risk factors for lung disease. Her past medical history is significant for asthma.     Review of Systems   Constitutional: Positive for fever and malaise/fatigue. Negative for chills and weight loss.   HENT: Positive for congestion and rhinorrhea. Negative for postnasal drip, sinus pain, sore throat and trouble swallowing.    Eyes: Negative for pain.   Respiratory: Positive for cough, sputum production, shortness of breath and wheezing.    Cardiovascular: Negative for chest pain and PND.   Gastrointestinal: Negative for nausea and vomiting.   Genitourinary: Negative for hematuria.   Musculoskeletal: Positive for myalgias.   Skin: Negative for rash.   Neurological: Negative for dizziness and headaches.     Allergies   Allergen Reactions   • Codeine Anaphylaxis   • Eggs Diarrhea and Vomiting     GI distress.   • Latex Rash     .Latex tape mostly   • Pcn [Penicillins] Shortness of Breath   • Tape       Objective:   /72   Pulse 94   Temp 37 °C (98.6 °F)   Resp 16   Ht 1.626 m (5' 4\")   Wt 91.6 kg (202 lb)   SpO2 92%   BMI 34.67 kg/m²   Physical Exam   Constitutional: She is oriented to person, place, and time. She appears " well-developed and well-nourished. No distress.   HENT:   Head: Normocephalic and atraumatic.   Right Ear: Tympanic membrane normal.   Left Ear: Tympanic membrane normal.   Nose: Nose normal. Right sinus exhibits no maxillary sinus tenderness and no frontal sinus tenderness. Left sinus exhibits no maxillary sinus tenderness and no frontal sinus tenderness.   Mouth/Throat: Uvula is midline, oropharynx is clear and moist and mucous membranes are normal. No posterior oropharyngeal edema, posterior oropharyngeal erythema or tonsillar abscesses. No tonsillar exudate.   Eyes: Pupils are equal, round, and reactive to light. Conjunctivae and EOM are normal. Right eye exhibits no discharge. Left eye exhibits no discharge.   Cardiovascular: Normal rate and regular rhythm.    No murmur heard.  Pulmonary/Chest: Effort normal. No accessory muscle usage. No respiratory distress. She has no decreased breath sounds. She has wheezes. She has no rhonchi. She has no rales.   Abdominal: Soft. She exhibits no distension. There is no tenderness.   Neurological: She is alert and oriented to person, place, and time. She has normal reflexes. No sensory deficit.   Skin: Skin is warm, dry and intact.   Psychiatric: She has a normal mood and affect.         Assessment/Plan:     1. Moderate persistent asthma with exacerbation  ipratropium-albuterol (DUONEB) nebulizer solution    MethylPREDNISolone (MEDROL DOSEPAK) 4 MG Tablet Therapy Pack    albuterol 108 (90 Base) MCG/ACT Aero Soln inhalation aerosol   2. Cough  POCT Influenza A/B     Influenza negative    Lung sounds improved with breathing treatment. PO2 reassessed and adequate at 95%.   It was explained to the pt. Today that due to the viral nature of the pt's illness, we will treat symptomatically today. Encouraged OTC supportive meds PRN. Humidification, increase fluids, avoid night time dairy.   Patient given precautionary s/sx that mandate immediate follow up and evaluation in the ED.  Advised of risks of not doing so.    DDX, Supportive care, and indications for immediate follow-up discussed with patient.    Instructed to return to clinic or nearest emergency department if we are not available for any change in condition, further concerns, or worsening of symptoms.    The patient demonstrated a good understanding and agreed with the treatment plan.

## 2019-04-16 ENCOUNTER — OFFICE VISIT (OUTPATIENT)
Dept: URGENT CARE | Facility: PHYSICIAN GROUP | Age: 69
End: 2019-04-16
Payer: MEDICARE

## 2019-04-16 ENCOUNTER — APPOINTMENT (OUTPATIENT)
Dept: RADIOLOGY | Facility: IMAGING CENTER | Age: 69
End: 2019-04-16
Attending: FAMILY MEDICINE
Payer: MEDICARE

## 2019-04-16 VITALS
WEIGHT: 203 LBS | SYSTOLIC BLOOD PRESSURE: 126 MMHG | OXYGEN SATURATION: 97 % | DIASTOLIC BLOOD PRESSURE: 84 MMHG | BODY MASS INDEX: 34.84 KG/M2 | HEART RATE: 78 BPM | TEMPERATURE: 99 F

## 2019-04-16 DIAGNOSIS — J22 LRTI (LOWER RESPIRATORY TRACT INFECTION): ICD-10-CM

## 2019-04-16 PROCEDURE — 99214 OFFICE O/P EST MOD 30 MIN: CPT | Performed by: FAMILY MEDICINE

## 2019-04-16 PROCEDURE — 71046 X-RAY EXAM CHEST 2 VIEWS: CPT | Mod: 26 | Performed by: FAMILY MEDICINE

## 2019-04-16 RX ORDER — AZITHROMYCIN 250 MG/1
TABLET, FILM COATED ORAL
Qty: 6 TAB | Refills: 0 | Status: SHIPPED | OUTPATIENT
Start: 2019-04-16 | End: 2019-09-07

## 2019-04-16 ASSESSMENT — ENCOUNTER SYMPTOMS
WHEEZING: 1
SHORTNESS OF BREATH: 0
FEVER: 0
COUGH: 1

## 2019-04-17 ENCOUNTER — OFFICE VISIT (OUTPATIENT)
Dept: MEDICAL GROUP | Facility: PHYSICIAN GROUP | Age: 69
End: 2019-04-17
Payer: MEDICARE

## 2019-04-17 VITALS
OXYGEN SATURATION: 96 % | DIASTOLIC BLOOD PRESSURE: 84 MMHG | BODY MASS INDEX: 34.66 KG/M2 | WEIGHT: 203 LBS | HEART RATE: 88 BPM | SYSTOLIC BLOOD PRESSURE: 140 MMHG | TEMPERATURE: 98.2 F | HEIGHT: 64 IN

## 2019-04-17 DIAGNOSIS — Z78.0 POSTMENOPAUSAL: ICD-10-CM

## 2019-04-17 DIAGNOSIS — Z12.31 SCREENING MAMMOGRAM, ENCOUNTER FOR: ICD-10-CM

## 2019-04-17 DIAGNOSIS — J45.41 MODERATE PERSISTENT ASTHMA WITH ACUTE EXACERBATION: ICD-10-CM

## 2019-04-17 DIAGNOSIS — R06.83 SNORING: ICD-10-CM

## 2019-04-17 DIAGNOSIS — Z85.828 HISTORY OF SKIN CANCER: ICD-10-CM

## 2019-04-17 DIAGNOSIS — E78.00 PURE HYPERCHOLESTEROLEMIA: ICD-10-CM

## 2019-04-17 PROCEDURE — 99214 OFFICE O/P EST MOD 30 MIN: CPT | Performed by: FAMILY MEDICINE

## 2019-04-17 RX ORDER — MONTELUKAST SODIUM 10 MG/1
10 TABLET ORAL DAILY
Qty: 30 TAB | Refills: 11 | Status: SHIPPED | OUTPATIENT
Start: 2019-04-17 | End: 2020-12-07

## 2019-04-17 RX ORDER — ALBUTEROL SULFATE 2.5 MG/3ML
2.5 SOLUTION RESPIRATORY (INHALATION) EVERY 4 HOURS PRN
Qty: 30 BULLET | Refills: 3 | Status: SHIPPED | OUTPATIENT
Start: 2019-04-17 | End: 2020-12-07

## 2019-04-17 ASSESSMENT — PATIENT HEALTH QUESTIONNAIRE - PHQ9: CLINICAL INTERPRETATION OF PHQ2 SCORE: 0

## 2019-04-17 NOTE — PATIENT INSTRUCTIONS

## 2019-04-17 NOTE — PROGRESS NOTES
Subjective:   Bella Navarro is a 69 y.o. female who presents for Cough (Wheezing, hard to breath for over 1 wk )        Cough   This is a new problem. The current episode started in the past 7 days. The problem has been gradually worsening. The problem occurs every few minutes. The cough is non-productive. Associated symptoms include wheezing. Pertinent negatives include no fever, nasal congestion, postnasal drip or shortness of breath.     Review of Systems   Constitutional: Negative for fever.   HENT: Negative for postnasal drip.    Respiratory: Positive for cough and wheezing. Negative for shortness of breath.      Allergies   Allergen Reactions   • Codeine Anaphylaxis   • Eggs Diarrhea and Vomiting     GI distress.   • Latex Rash     .Latex tape mostly   • Pcn [Penicillins] Shortness of Breath   • Tape       Objective:   /84 (BP Location: Right arm, Patient Position: Sitting, BP Cuff Size: Large adult)   Pulse 78   Temp 37.2 °C (99 °F) (Temporal)   Wt 92.1 kg (203 lb)   SpO2 97%   BMI 34.84 kg/m²   Physical Exam   Constitutional: She is oriented to person, place, and time. She appears well-developed and well-nourished. No distress.   HENT:   Head: Normocephalic and atraumatic.   Eyes: Pupils are equal, round, and reactive to light. Conjunctivae and EOM are normal.   Cardiovascular: Normal rate and regular rhythm.    No murmur heard.  Pulmonary/Chest: Effort normal. No respiratory distress. She has wheezes. She has no rales.   Abdominal: Soft. She exhibits no distension. There is no tenderness.   Neurological: She is alert and oriented to person, place, and time. She has normal reflexes. No sensory deficit.   Skin: Skin is warm and dry.   Psychiatric: She has a normal mood and affect.         Assessment/Plan:   1. LRTI (lower respiratory tract infection)  - DX-CHEST-2 VIEWS; Future  - azithromycin (ZITHROMAX) 250 MG Tab; Take 2 tablets by mouth on day one. Take one tablet by mouth the  remaining days until gone  Dispense: 6 Tab; Refill: 0    Differential diagnosis, natural history, supportive care, and indications for immediate follow-up discussed.

## 2019-04-17 NOTE — PROGRESS NOTES
CC: Wheezing    HISTORY OF THE PRESENT ILLNESS: Patient is a 69 y.o. female. This pleasant patient is here today to establish care and discuss health problems below.    Frequent respiratory infections, asthma: Patient is here primarily because she is been seen by urgent care about 4 times in the past 4 months for asthma flares and respiratory infections.  Most recently she was seen yesterday for respiratory infection with wheezing.  She was prescribed azithromycin, Medrol Dosepak.  Today she is requesting nebulizer for home purposes.  She is not currently on a controller inhaler in spite of her multiple flares of asthma.  She is very hesitant to get on a daily inhaled corticosteroid for this issue.  She is open to Singulair.    Hyperlipidemia: Labs from 2017 noted to have LDL of 183.  Patient states she was unaware of this diagnosis.  She is open to getting her cholesterol rechecked today.    Snoring: Last year patient notes that she had a surgery and was told apparently preoperative screening that she likely has sleep apnea and should get a sleep study.  She was never referred and has yet to do so.  She is requesting a sleep study today as she does snore a lot.    Allergies: Codeine; Eggs; Latex; Pcn [penicillins]; and Tape    Current Outpatient Prescriptions Ordered in [facility]   Medication Sig Dispense Refill   • montelukast (SINGULAIR) 10 MG Tab Take 1 Tab by mouth every day. 30 Tab 11   • albuterol (PROVENTIL) 2.5mg/3ml Nebu Soln solution for nebulization 3 mL by Nebulization route every four hours as needed for Shortness of Breath. 30 Bullet 3   • azithromycin (ZITHROMAX) 250 MG Tab Take 2 tablets by mouth on day one. Take one tablet by mouth the remaining days until gone 6 Tab 0   • MethylPREDNISolone (MEDROL DOSEPAK) 4 MG Tablet Therapy Pack Take 1 Tab by mouth every day. 1 Kit 0   • albuterol 108 (90 Base) MCG/ACT Aero Soln inhalation aerosol Inhale 2 Puffs by mouth every 6 hours as needed for Shortness of  Breath. 8.5 g 0   • Non Formulary Request Take 1 Tab by mouth every day. **CHINESE HERBS**     • GARLIC PO Take 4 Tabs by mouth 2 Times a Day.       No current [facility]-ordered facility-administered medications on file.        Past Medical History:   Diagnosis Date   • Anesthesia     sensitive to medications   • Arthritis 02/2019    R thumb   • ASTHMA     does not take inhaler( cold air and cigaretts)   • Bile leak    • Bronchitis     hx [id number], 2010, 2019   • Cancer (HCC) 2018    skin   • Cold 01/10/2019   • Diabetes (HCC)     diet   • Hypertension    • Infectious disease 02/01/2019    flu   • Nephrolithiasis 2008   • Obesity    • Pain     bilat. shoulders   • Pneumonia    • Snoring        Past Surgical History:   Procedure Laterality Date   • LESION EXCISION GENERAL Right 2/5/2019    Procedure: LESION EXCISION GENERAL - SQUAMOUS CELL CARCINOMA NOSE;  Surgeon: Martinez Martin M.D.;  Location: [facility];  Service: Plastics   • FLAP CLOSURE Right 2/5/2019    Procedure: FLAP CLOSURE - not done;  Surgeon: Martinez Martin M.D.;  Location: [facility];  Service: Plastics   • BREAST IMPLANT REMOVAL Bilateral 1/15/2016    Procedure: BREAST IMPLANT EXCHANGE;  Surgeon: Martinez Martin M.D.;  Location: [facility];  Service:    • CAPSULECTOMY Bilateral 1/15/2016    Procedure: CAPSULECTOMY AND CAPSULORRHAPHIES;  Surgeon: Martinez Martin M.D.;  Location: [facility];  Service:    • MASTOPEXY Bilateral 1/15/2016    Procedure: MASTOPEXY BENELLI;  Surgeon: Martinez Martin M.D.;  Location: [facility];  Service:    • ERCP  10/8/2014    Performed by Martinez Siegel M.D. at [facility]   • ERCP IN OR  8/12/2014    Performed by Martinez Siegel M.D. at SURGERY [facility]   • DEEPALI BY LAPAROSCOPY  8/4/2014    Performed by [place] Salas M.D. at [facility]   • PB ENLARGE BREAST WITH IMPLANT  1996   • TONSILLECTOMY    "      Social History   Substance Use Topics   • Smoking status: Former Smoker     Packs/day: 1.00     Years: 5.00     Types: Cigarettes     Start date: 1/1/1968     Quit date: 1/1/1973   • Smokeless tobacco: Never Used      Comment: continue to avoid   • Alcohol use No       Social History     Social History Narrative   • No narrative on file       Family History   Problem Relation Age of Onset   • Heart Attack Father        ROS:     - Constitutional: Positive for recent fever with respiratory illness.  Negative for unexpected weight change, and fatigue/generalized weakness.      - Respiratory: See HPI.       - Skin: Positive for recent skin cancer, basal cell on her nose which has been surgically removed.      Exam: /84 (BP Location: Left arm, Patient Position: Sitting, BP Cuff Size: Large adult)   Pulse 88   Temp 36.8 °C (98.2 °F) (Temporal)   Ht 1.626 m (5' 4\")   Wt 92.1 kg (203 lb)   SpO2 96%  Body mass index is 34.84 kg/m².    General: Well appearing, NAD  Pulmonary: Harsh cough noted.  Coarse expiratory wheezing throughout all fields.  Cardiovascular: Regular rate and rhythm without murmur, rubs or gallop.   Psych: Normal mood and affect. Alert and oriented. Judgment and insight is normal.      Please note that this dictation was created using voice recognition software. I have made every reasonable attempt to correct obvious errors, but I expect that there are errors of grammar and possibly content that I did not discover before finalizing the note.      Assessment/Plan  Bella was seen today for asthma and snoring.    Diagnoses and all orders for this visit:    Moderate persistent asthma with acute exacerbation  Chronic uncontrolled problem for the patient, but new problem to me.  Her asthma is currently not controlled and she has had at least for asthma flares since around January.  She is very hesitant to use a corticosteroid, even though I would recommend one.  She is open to trialing Singulair " as she believes her asthma is related to sagebrush allergies.  I have also given her a prescription for a nebulizer as well today.  She plans to consider an inhaled corticosteroid if symptoms are not improving.  -     montelukast (SINGULAIR) 10 MG Tab; Take 1 Tab by mouth every day.  -     albuterol (PROVENTIL) 2.5mg/3ml Nebu Soln solution for nebulization; 3 mL by Nebulization route every four hours as needed for Shortness of Breath.  -     DME Nebulizer    Snoring  Chronic uncontrolled problem for the patient.  Referred to sleep study today.  -     REFERRAL TO SLEEP STUDIES    History of skin cancer  Recent history of basal cell carcinoma.  She is not currently established with a dermatologist.  I have encouraged her to get regular skin checks and referral was placed to dermatology.  -     REFERRAL TO DERMATOLOGY    Pure hypercholesterolemia  Chronic problem noted on chart review with LDL of 183.  This lipid profile was about 2 years ago.  We will go ahead and recheck today, though I do suspect she will need a statin.  -     Lipid Profile; Future    Postmenopausal  -     DS-BONE DENSITY STUDY (DEXA); Future    Screening mammogram, encounter for  -     MA-SCREENING MAMMO BILAT W/TOMOSYNTHESIS W/CAD; Future    Health Maintenance: Patient will plan to come back in for her pneumonia vaccine.  She continues to be mildly febrile from her respiratory infection today.    Follow-up in about 3 months or sooner if asthma symptoms not improving.    Gracy Quijano,   [facility]

## 2019-05-07 ENCOUNTER — HOSPITAL ENCOUNTER (OUTPATIENT)
Dept: RADIOLOGY | Facility: MEDICAL CENTER | Age: 69
End: 2019-05-07
Attending: FAMILY MEDICINE
Payer: MEDICARE

## 2019-05-07 DIAGNOSIS — Z78.0 POSTMENOPAUSAL: ICD-10-CM

## 2019-05-07 DIAGNOSIS — Z12.31 SCREENING MAMMOGRAM, ENCOUNTER FOR: ICD-10-CM

## 2019-05-07 PROBLEM — M85.80 OSTEOPENIA: Status: ACTIVE | Noted: 2019-05-07

## 2019-05-07 PROCEDURE — 77080 DXA BONE DENSITY AXIAL: CPT

## 2019-05-07 PROCEDURE — 77063 BREAST TOMOSYNTHESIS BI: CPT

## 2019-06-27 ENCOUNTER — OFFICE VISIT (OUTPATIENT)
Dept: DERMATOLOGY | Facility: IMAGING CENTER | Age: 69
End: 2019-06-27
Payer: MEDICARE

## 2019-06-27 VITALS — BODY MASS INDEX: 34.89 KG/M2 | TEMPERATURE: 98.6 F | HEIGHT: 64 IN | WEIGHT: 204.4 LBS

## 2019-06-27 DIAGNOSIS — Z85.89 HISTORY OF SQUAMOUS CELL CARCINOMA: ICD-10-CM

## 2019-06-27 DIAGNOSIS — Z12.83 SKIN CANCER SCREENING: ICD-10-CM

## 2019-06-27 DIAGNOSIS — D22.9 NEVUS: ICD-10-CM

## 2019-06-27 DIAGNOSIS — L82.1 SEBORRHEIC KERATOSIS: ICD-10-CM

## 2019-06-27 DIAGNOSIS — L90.8 SKIN AGING: ICD-10-CM

## 2019-06-27 DIAGNOSIS — L81.4 LENTIGO: ICD-10-CM

## 2019-06-27 PROCEDURE — 99202 OFFICE O/P NEW SF 15 MIN: CPT | Performed by: DERMATOLOGY

## 2019-06-27 NOTE — PROGRESS NOTES
CC: Skin Exam    Subjective: new patient here for skin exam    Pt. Here to establish care for skin exam.  Hx of SCC, nose treated by Dr. Martin in Jan/Feb. 2019.    ROS: no fevers/chills. No itch.    DermPMH: no skin cancer/melanoma  No problem-specific Assessment & Plan notes found for this encounter.    History of skin cancer: Yes SCC, Jan 2019  History of precancers/actinic keratoses: No  History of biopsies:Yes, Details: Jan 2019, SCC  History of blistering/severe sunburns:Yes, Details: throughtout life  Family history of skin cancer:Yes, Details: type unknown  Family history of atypical moles:No    Relevant PMH: NC  Social: former smoker    PE: Gen:WDWN female in NAD. Scalp/face/eyes/lips/conjunctivae/neck/chest/back/arms/legs/hands/feet/buttocks - without suspicious lesions noted.  Genitals exam declined  -many tan macules/brown macules. Benign in appearance on torso<extremities  -scattered skin colored and hyperpigmented macules/papules appearing benign on torso and extremities      A/P: Nevi: benign appearing:  -Reviewed ABCDEs  -Reviewed skin cancer detection/prevention  -RTC PRN growth/changes/concerning features    Lentigos/SKs: benign  -reassurance  -reviewed skin cancer detection/prevention  -counseled on cosmetic treatment PRN    Hx of skin cancer:  -cont'd sunprotection and skin cancer surveillance  -Q 6mo-annual exam recommended; f/u suspicious lesions PRN    I have reviewed medications relevant to my specialty.

## 2019-07-25 ENCOUNTER — APPOINTMENT (OUTPATIENT)
Dept: RADIOLOGY | Facility: IMAGING CENTER | Age: 69
End: 2019-07-25
Attending: PHYSICIAN ASSISTANT
Payer: MEDICARE

## 2019-07-25 ENCOUNTER — OFFICE VISIT (OUTPATIENT)
Dept: URGENT CARE | Facility: PHYSICIAN GROUP | Age: 69
End: 2019-07-25
Payer: MEDICARE

## 2019-07-25 VITALS
BODY MASS INDEX: 34.35 KG/M2 | WEIGHT: 201.2 LBS | TEMPERATURE: 98.6 F | RESPIRATION RATE: 15 BRPM | HEIGHT: 64 IN | DIASTOLIC BLOOD PRESSURE: 80 MMHG | OXYGEN SATURATION: 95 % | HEART RATE: 65 BPM | SYSTOLIC BLOOD PRESSURE: 134 MMHG

## 2019-07-25 DIAGNOSIS — M79.671 RIGHT FOOT PAIN: ICD-10-CM

## 2019-07-25 DIAGNOSIS — M21.619 BUNION OF GREAT TOE: ICD-10-CM
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
## 2019-07-25 PROCEDURE — 99213 OFFICE O/P EST LOW 20 MIN: CPT | Performed by: PHYSICIAN ASSISTANT

## 2019-07-25 PROCEDURE — 73630 X-RAY EXAM OF FOOT: CPT | Mod: TC,RT | Performed by: PHYSICIAN ASSISTANT

## 2019-07-25 ASSESSMENT — ENCOUNTER SYMPTOMS
TINGLING: 0
SENSORY CHANGE: 0
CONSTITUTIONAL NEGATIVE: 1

## 2019-07-25 NOTE — PROGRESS NOTES
Subjective:   Bella Navarro is a 69 y.o. female who presents today with   Chief Complaint   Patient presents with   • Foot Problem     fell yesterday and injuryed right foot        Foot Problem   This is a new problem. The current episode started yesterday. The problem occurs constantly. The problem has been unchanged. The symptoms are aggravated by bending and walking. She has tried ice for the symptoms. The treatment provided mild relief.     Patient states she was walking yesterday and had a ground-level fall after tripping hurting her right foot.  Patient does state that she had a bunion on the medial aspect of her right big toe and that is where most of the pain is occurring today.  PMH:  has a past medical history of Anesthesia; Arthritis (02/2019); ASTHMA; Bile leak; Bronchitis; Cancer ([facility]) (2018); Cold (01/10/2019); Diabetes (HCC); Hypertension; Infectious disease (02/01/2019); Nephrolithiasis (2008); Obesity; Pain; Pneumonia; and Snoring. She also has no past medical history of CAD (coronary artery disease); COPD; or Liver disease.  MEDS:   Current Outpatient Prescriptions:   •  montelukast (SINGULAIR) 10 MG Tab, Take 1 Tab by mouth every day., Disp: 30 Tab, Rfl: 11  •  albuterol (PROVENTIL) 2.5mg/3ml Nebu Soln solution for nebulization, 3 mL by Nebulization route every four hours as needed for Shortness of Breath., Disp: 30 Bullet, Rfl: 3  •  azithromycin (ZITHROMAX) 250 MG Tab, Take 2 tablets by mouth on day one. Take one tablet by mouth the remaining days until gone, Disp: 6 Tab, Rfl: 0  •  MethylPREDNISolone (MEDROL DOSEPAK) 4 MG Tablet Therapy Pack, Take 1 Tab by mouth every day., Disp: 1 Kit, Rfl: 0  •  albuterol 108 (90 Base) MCG/ACT Aero Soln inhalation aerosol, Inhale 2 Puffs by mouth every 6 hours as needed for Shortness of Breath., Disp: 8.5 g, Rfl: 0  •  Non Formulary Request, Take 1 Tab by mouth every day. **CHINESE HERBS**, Disp: , Rfl:   •  GARLIC PO, Take 4 Tabs by mouth 2 Times  a Day., Disp: , Rfl:   ALLERGIES:   Allergies   Allergen Reactions   • Codeine Anaphylaxis   • Eggs Diarrhea and Vomiting     GI distress.   • Latex Rash     .Latex tape mostly   • Pcn [Penicillins] Shortness of Breath   • Tape      SURGHX:   Past Surgical History:   Procedure Laterality Date   • LESION EXCISION GENERAL Right 2/5/2019    Procedure: LESION EXCISION GENERAL - SQUAMOUS CELL CARCINOMA NOSE;  Surgeon: Martinez Martin M.D.;  Location: SURGERY [facility];  Service: Plastics   • FLAP CLOSURE Right 2/5/2019    Procedure: FLAP CLOSURE - not done;  Surgeon: Martinez Martin M.D.;  Location: [facility];  Service: Plastics   • BREAST IMPLANT REMOVAL Bilateral 1/15/2016    Procedure: BREAST IMPLANT EXCHANGE;  Surgeon: Martinez Martin M.D.;  Location: [facility];  Service:    • CAPSULECTOMY Bilateral 1/15/2016    Procedure: CAPSULECTOMY AND CAPSULORRHAPHIES;  Surgeon: Martinez Martin M.D.;  Location: [facility];  Service:    • MASTOPEXY Bilateral 1/15/2016    Procedure: MASTOPEXY BENELLI;  Surgeon: Martinez Martin M.D.;  Location: [facility];  Service:    • ERCP  10/8/2014    Performed by Martinez Siegel M.D. at SURGERY [facility]   • ERCP IN OR  8/12/2014    Performed by Martinez Siegel M.D. at SURGERY [facility]   • DEEPALI BY LAPAROSCOPY  8/4/2014    Performed by Los Salas M.D. at SURGERY [facility]   • PB ENLARGE BREAST WITH IMPLANT  1996   • TONSILLECTOMY       SOCHX:  reports that she quit smoking about 46 years ago. Her smoking use included Cigarettes. She started smoking about 51 years ago. She has a 5.00 pack-year smoking history. She has never used smokeless tobacco. She reports that she does not drink alcohol or use drugs.  FH: Reviewed with patient, not pertinent to this visit.       Review of Systems   Constitutional: Negative.    Musculoskeletal:        Right foot pain     Neurological: Negative for tingling  "and sensory change.        Objective:   /80   Pulse 65   Temp 37 °C (98.6 °F) (Temporal)   Resp 15   Ht 1.626 m (5' 4\")   Wt 91.3 kg (201 lb 3.2 oz)   SpO2 95%   BMI 34.54 kg/m²   Physical Exam   Constitutional: She appears well-developed and well-nourished. No distress.   HENT:   Head: Normocephalic and atraumatic.   Right Ear: Hearing normal.   Left Ear: Hearing normal.   Cardiovascular: Normal rate and regular rhythm.    Pulmonary/Chest: Effort normal.   Musculoskeletal:        Right ankle: She exhibits swelling. She exhibits no deformity, no laceration and normal pulse. Tenderness.        Right foot: There is no deformity.        Feet:    Patient has pain with plantar flexion and dorsiflexion of her right foot over the first metatarsal.  Nontender to palpation of her ankle.    Feet:   Right Foot:   Protective Sensation: 5 sites tested. 5 sites sensed.   Skin Integrity: Negative for skin breakdown.   Neurological: She is alert. Coordination normal.   Skin: Skin is warm and dry.   Psychiatric: She has a normal mood and affect.   Nursing note and vitals reviewed.  Normal sensation and neurovascularly intact distally.   Bunion of both feet bilaterally on medial portion of first metatarsal.     DX FOOT  FINDINGS:  Mild narrowing of interphalangeal joints.  Soft tissue and bony prominence at the medial aspect of distal 1st metatarsal head.  Mild flattening of 2nd metatarsal head.  No fracture or dislocation.   Impression       1.  No fracture or dislocation of RIGHT foot.  2.  Bunion formation noted.  3.  Mild degenerative changes.  4.  Mild flattening of 2nd metatarsal head suggesting osteonecrosis.           Assessment/Plan:   Assessment    1. Right foot pain  - DX-FOOT-COMPLETE 3+ RIGHT; Future    2. Fall, initial encounter  - DX-FOOT-COMPLETE 3+ RIGHT; Future    3. Bunion of great toe  - REFERRAL TO PODIATRY  Rest, Ice, Ibuprofen as needed.   Walking boot given the patient to use to help relieve pain " with weightbearing on the right foot.  Patient requests follow-up with podiatry to evaluate her bunions.  Differential diagnosis, natural history, supportive care, and indications for immediate follow-up discussed.   Patient given instructions and understanding of medications and treatment.    If not improving in 3-5 days, F/U with PCP or return to  if symptoms worsen.    Patient agreeable to plan.      Please note that this dictation was created using voice recognition software. I have made every reasonable attempt to correct obvious errors, but I expect that there are errors of grammar and possibly content that I did not discover before finalizing the note.    Stephen Henriquez PA-C

## 2019-08-28 ENCOUNTER — SLEEP CENTER VISIT (OUTPATIENT)
Dept: SLEEP MEDICINE | Facility: MEDICAL CENTER | Age: 69
End: 2019-08-28
Payer: MEDICARE

## 2019-08-28 VITALS
SYSTOLIC BLOOD PRESSURE: 132 MMHG | OXYGEN SATURATION: 95 % | DIASTOLIC BLOOD PRESSURE: 82 MMHG | HEART RATE: 80 BPM | RESPIRATION RATE: 14 BRPM | WEIGHT: 194 LBS | BODY MASS INDEX: 33.12 KG/M2 | HEIGHT: 64 IN

## 2019-08-28 DIAGNOSIS — J45.20 MILD INTERMITTENT ASTHMA WITHOUT COMPLICATION: ICD-10-CM

## 2019-08-28 DIAGNOSIS — Z87.891 FORMER SMOKER: ICD-10-CM

## 2019-08-28 DIAGNOSIS — G47.30 SLEEP APNEA, UNSPECIFIED TYPE: ICD-10-CM

## 2019-08-28 DIAGNOSIS — E66.9 OBESITY (BMI 30-39.9): ICD-10-CM

## 2019-08-28 DIAGNOSIS — G47.10 HYPERSOMNOLENCE: ICD-10-CM

## 2019-08-28 DIAGNOSIS — R06.83 SNORING: ICD-10-CM

## 2019-08-28 PROCEDURE — 99214 OFFICE O/P EST MOD 30 MIN: CPT | Performed by: INTERNAL MEDICINE

## 2019-08-28 RX ORDER — ZOLPIDEM TARTRATE 5 MG/1
5 TABLET ORAL NIGHTLY PRN
Qty: 30 TAB | Refills: 0 | Status: SHIPPED | OUTPATIENT
Start: 2019-08-28 | End: 2019-09-07

## 2019-08-30 NOTE — PROGRESS NOTES
CC: Snoring, witnessed nocturnal apnea and excessive daytime somnolence, suspected sleep apnea hypopnea syndrome.    HPI:   Ms. Navarro is a 69-year-old woman referred by Dr. Gracy Quijano to assist in evaluation and management of suspected sleep disordered breathing.    She is followed for health maintenance and for asthma.  She has episodes of dyspnea, minimally productive cough and wheezing that may follow upper respiratory tract infections or exposure to seasonal allergens.  She is treated with albuterol by metered-dose inhaler or nebulizer and is on montelukast daily.  She has declined a corticosteroid-containing controller agent in the past.  She is a lifelong non-smoker.  In the course of her ongoing evaluation symptoms suggesting sleep disordered breathing were identified.    She has a regular sleep schedule, as indicated by the sleep diary, with bedtime at about midnight and she falls asleep within about 10 minutes.  She awakens perhaps 3 times on an average night but quickly returns to sleep.  She arises at about 10 AM with a dry mouth but no fatigue, grogginess or regular morning headaches.  The spousal questionnaire indicates loud snoring, bruxism and sleep talking and she says that her  does sleep in a separate room because of her snoring.  She is tired during the day and may doze off during quiet moments or nap when the opportunity presents.  Her [place] sleepiness score is 7 points but does not seem to capture the degree of fatigue and sleepiness that she describes.  She drinks caffeinated beverages sparingly and does not use substances to induce sleep or to maintain wakefulness.  She has used nasal dilator strips and a chinstrap in an effort to reduce snoring but these have not been effective.  She has not previously been evaluated for sleep issues.  She does not have symptoms suggesting parasomnia or restless leg syndrome.        Patient Active Problem List    Diagnosis Date Noted   •  Obesity (BMI 30-39.9) 08/28/2019   • Skin aging 06/27/2019   • Osteopenia 05/07/2019   • Snoring 04/17/2019   • History of skin cancer 02/05/2019   • Chronic pain of both shoulders 05/16/2017   • Obesity (BMI 35.0-39.9 without comorbidity) 05/16/2017   • Mild intermittent asthma without complication 03/22/2016       Past Medical History:   Diagnosis Date   • Anesthesia     sensitive to medications   • Arthritis 02/2019    R thumb   • ASTHMA     does not take inhaler( cold air and cigaretts)   • Back pain    • Bile leak    • Bronchitis     hx [id number], 2010, 2019   • Cancer (HCC) 2018    skin   • Chickenpox    • Cold 01/10/2019   • Diabetes ([facility])     diet   • Hypertension    • Infectious disease 02/01/2019    flu   • Influenza    • Kidney stone    • Nasal drainage    • Nephrolithiasis 2008   • Obesity    • Pain     bilat. shoulders   • Pneumonia    • Restless leg syndrome    • Snoring    • Tonsillitis        Past Surgical History:   Procedure Laterality Date   • LESION EXCISION GENERAL Right 2/5/2019    Procedure: LESION EXCISION GENERAL - SQUAMOUS CELL CARCINOMA NOSE;  Surgeon: Martinez Martin M.D.;  Location: [facility];  Service: Plastics   • FLAP CLOSURE Right 2/5/2019    Procedure: FLAP CLOSURE - not done;  Surgeon: Martinez Martin M.D.;  Location: [facility];  Service: Plastics   • BREAST IMPLANT REMOVAL Bilateral 1/15/2016    Procedure: BREAST IMPLANT EXCHANGE;  Surgeon: Martinez Martin M.D.;  Location: [facility];  Service:    • CAPSULECTOMY Bilateral 1/15/2016    Procedure: CAPSULECTOMY AND CAPSULORRHAPHIES;  Surgeon: Martinez Martin M.D.;  Location: [facility];  Service:    • MASTOPEXY Bilateral 1/15/2016    Procedure: MASTOPEXY BENELLI;  Surgeon: Martinez Martin M.D.;  Location: [facility];  Service:    • ERCP  10/8/2014    Performed by Martinez Siegel M.D. at [facility]   • ERCP IN OR  8/12/2014    Performed  by Martinez Siegel M.D. at SURGERY [facility]   • DEEPALI BY LAPAROSCOPY  2014    Performed by Los Salas M.D. at SURGERY [facility]   • PB ENLARGE BREAST WITH IMPLANT     • CHOLECYSTECTOMY     • TONSILLECTOMY         Family History   Problem Relation Age of Onset   • Stroke Mother    • Heart Attack Father        Social History     Socioeconomic History   • Marital status:      Spouse name: Not on file   • Number of children: Not on file   • Years of education: Not on file   • Highest education level: Not on file   Occupational History   • Not on file   Social Needs   • Financial resource strain: Not on file   • Food insecurity:     Worry: Not on file     Inability: Not on file   • Transportation needs:     Medical: Not on file     Non-medical: Not on file   Tobacco Use   • Smoking status: Former Smoker     Packs/day: 1.00     Years: 5.00     Pack years: 5.00     Types: Cigarettes     Start date: 1968     Last attempt to quit: 1973     Years since quittin.6   • Smokeless tobacco: Never Used   • Tobacco comment: continue to avoid   Substance and Sexual Activity   • Alcohol use: No     Alcohol/week: 0.0 oz   • Drug use: No   • Sexual activity: Not on file   Lifestyle   • Physical activity:     Days per week: Not on file     Minutes per session: Not on file   • Stress: Not on file   Relationships   • Social connections:     Talks on phone: Not on file     Gets together: Not on file     Attends Sabianist service: Not on file     Active member of club or organization: Not on file     Attends meetings of clubs or organizations: Not on file     Relationship status: Not on file   • Intimate partner violence:     Fear of current or ex partner: Not on file     Emotionally abused: Not on file     Physically abused: Not on file     Forced sexual activity: Not on file   Other Topics Concern   • Not on file   Social History Narrative   • Not on file       Current Outpatient Medications  "  Medication Sig Dispense Refill   • zolpidem (AMBIEN) 5 MG Tab Take 1 Tab by mouth at bedtime as needed for Sleep (Insomnia) for up to 42 days. 30 Tab 0   • montelukast (SINGULAIR) 10 MG Tab Take 1 Tab by mouth every day. 30 Tab 11   • albuterol (PROVENTIL) 2.5mg/3ml Nebu Soln solution for nebulization 3 mL by Nebulization route every four hours as needed for Shortness of Breath. 30 Bullet 3   • albuterol 108 (90 Base) MCG/ACT Aero Soln inhalation aerosol Inhale 2 Puffs by mouth every 6 hours as needed for Shortness of Breath. 8.5 g 0   • Non Formulary Request Take 1 Tab by mouth every day. **CHINESE HERBS**     • GARLIC PO Take 4 Tabs by mouth 2 Times a Day.     • azithromycin (ZITHROMAX) 250 MG Tab Take 2 tablets by mouth on day one. Take one tablet by mouth the remaining days until gone (Patient not taking: Reported on 8/28/2019) 6 Tab 0   • MethylPREDNISolone (MEDROL DOSEPAK) 4 MG Tablet Therapy Pack Take 1 Tab by mouth every day. (Patient not taking: Reported on 8/28/2019) 1 Kit 0     No current facility-administered medications for this visit.     \"CURRENT RX\"      Allergies: Codeine; Eggs; Latex; Pcn [penicillins]; and Tape      ROS  Positive for the sleep, respiratory and dermatologic issues reviewed above.  She wears corrective lenses but has no diplopia.  She has seasonal and allergic rhinitis without tinnitus or loss of auditory acuity.  She denies heartburn or abdominal discomfort, chest pain or palpitations.  She does have some arthralgias and back pain.  All other aspects of the CPT review of systems process are negative as documented in the attached self history form.      Physical Exam:   /82 (BP Location: Right arm, Patient Position: Sitting, BP Cuff Size: Adult)   Pulse 80   Resp 14   Ht 1.626 m (5' 4\")   Wt 88 kg (194 lb)   SpO2 95%   BMI 33.30 kg/m²    Head and neck examination demonstrates no mucosal lesion, purulent drainage or evident polyps. The pharynx is benign with a " Mallampati III presentation. The neck is supple without thyromegaly. On chest examination there are symmetrical bilateral breath sounds without rales, wheezing or consolidation. On cardiac examination, the apical impulse and heart sounds are normal and the rhythm is regular. There is no murmur, gallop or rub and no jugular venous distention. The abdomen is soft with active bowel sounds and no palpable hepatosplenomegaly, mass, guarding or rebound. The extremities show no clubbing, cyanosis or edema and no signs of deep venous thrombosis. There is no warmth, redness, tenderness or palpable venous cord in the calves. The skin is clear, warm and dry. There is no unusual peripheral lymphadenopathy. Peripheral pulses are palpable in all 4 extremities. On neurologic examination, cranial nerve function is intact, motor tone is symmetrical, and the patient is alert, oriented and responsive.       Problems:  1. Sleep apnea, unspecified type  She presents with snoring and excessive daytime somnolence.  She has a crowded posterior pharyngeal airway and a body mass index of 33.  The clinical probability of sleep apnea hypopnea syndrome is significant. Accurate diagnosis and effective treatment are required not only to improve levels of daytime alertness but also to reduce the cardiac and neurologic risks associated with untreated sleep apnea. We have discussed diagnostic options including in-laboratory, attended polysomnography and home sleep testing. We have also discussed treatment options including airway pressurization, reconstructive otolaryngologic surgery, dental appliances and weight management.    2. Hypersomnolence  Probably related to the suspected sleep disordered breathing.  She is spending sufficient nightly time in bed and does not seem to have sleep hygiene issues.    3. Snoring    4. Mild intermittent asthma without complication  Her symptoms are stable at this time with occasional use of the rescue inhaler and  "no evidence for infection or other complications.  Given the persistence of her symptoms however I have encouraged her to consider the use of a steroid-containing controller inhaler and emphasized that in reasonable doses of those medications do not usually result in systemic side effects.  She did receive the influenza vaccination last year and will be due again this fall.  She has received the first pneumococcal vaccination and would be a candidate for the second injection.  Vaccine in the sleep clinic this will be deferred to her primary care provider.    5. Obesity (BMI 30-39.9)  We have talked about the role of weight management in the treatment of sleep disordered breathing.  - Patient identified as having weight management issue.  Appropriate orders and counseling given.    6. Former smoker  She is encouraged to remain completely and permanently off cigarettes.    - \"dx with associated meds/order\"      Plan:   1.  Polysomnogram, possible split-night study.    2.  Return visit afterwards to discuss test results and treatment options.    We appreciate the opportunity to assist in her care.  "

## 2019-09-05 ENCOUNTER — SLEEP STUDY (OUTPATIENT)
Dept: SLEEP MEDICINE | Facility: MEDICAL CENTER | Age: 69
End: 2019-09-05
Attending: INTERNAL MEDICINE
Payer: MEDICARE

## 2019-09-05 DIAGNOSIS — G47.30 SLEEP APNEA, UNSPECIFIED TYPE: ICD-10-CM

## 2019-09-05 PROCEDURE — 95810 POLYSOM 6/> YRS 4/> PARAM: CPT | Performed by: INTERNAL MEDICINE

## 2019-09-06 NOTE — PROCEDURES
The patient underwent a diagnostic polysomnogram using the standard montage for measurement of parameters of sleep, respiratory events, movement abnormalities, and heart rate and rhythm.    A microphone was used to monitor snoring.    Analysis  Study start time was 10:01:28 PM.  Diagnostic recording time was 7h 10.0m with a total sleep time of 5h 48.0m resulting in a sleep efficiency of 80.93%%.    Sleep latency from the start of the study was 18 minutes and the latency from sleep to REM was 73 minutes.  In total,82  arousals were scored for an arousal index of 14.1.  Respiratory:  There were a total of 27 apneas consisting of 27 obstructive apneas, 0 mixed apneas, and 0 central apneas.  A total of 28 hypopneas were scored.  The apnea index was 4.66 per hour and the hypopnea index was 4.83 per hour resulting in an overall AHI of 9.48.  AHI during rem was 44.3 and AHI while supine was 14.74.  Oximetry:  There was a mean oxygen saturation of 91.0% with a minimum oxygen saturation of 77.0%.  Time spent with oxygen saturations below 89% was 22.0 minutes.  Cardiac:  The highest heart rate seen while awake was 101 BPM while the highest heart rate during sleep was 88 BPM with an average sleeping heart rate of 65 BPM.  Limb Movements:  There were a total of 329 PLMs during sleep, of which 51 were PLMS arousals.  This resulted in a PLMS index of 56.7 and a PLMS arousal index of 8.8.      Interpretation:    Ms. Navarro presented with snoring and excessive daytime somnolence.  She has a history of mild asthma.  This is a diagnostic study.    There is fragmentation of sleep with increased wake after sleep onset time and an elevated arousal and awakening index.  Nonetheless significant slow-wave sleep and REM sleep time is included.  There are frequent periodic limb movements and the periodic limb movement with arousal index is 8.6 events per hour.    The apnea hypopnea index is 9.5 events per hour, including obstructive  hypopnea and obstructive apnea episodes.  The sleep breathing events occur more commonly in REM sleep where the index rises to 44.3 events per hour and in supine sleep where the index is 14.7 events per hour.  The lowest arterial oxygen saturation is 77% on room air.  She spends 42% of the study time with a saturation below 90%.  The heart rate varies from 54 to 88 bpm.  Significant snoring is noted.    Assessment:  Mild obstructive sleep apnea hypopnea with an apnea hypopnea index of 9.5 events per hour, higher in supine and REM sleep.  Significant nocturnal hypoxemia with a lowest arterial oxygen saturation of 77% on room air.  Fragmentation of sleep related to the breathing events, to periodic limb movements and perhaps to laboratory effect as well.    Recommendations:  Treatment is probably indicated as the patient presented with excessive daytime somnolence and has significant nocturnal hypoxemia.  CPAP could be initiated through a titration polysomnogram or with the use of auto titrating CPAP.  Given the degree of hypoxemia seen in this study, hypoventilation is a possibility and a titration polysomnogram would be preferable. Alternative treatments for sleep-disordered breathing include reconstructive otolaryngologic surgery, dental appliances and weight loss. If any these latter treatment options are selected, a follow-up polysomnogram is suggested to assess the efficacy of therapy. Behavioral measures including avoidance of sedatives, alcohol and supine body position may be of additional benefit. Patients with severe sleep apnea are typically advised not to drive a motor vehicle or operate hazardous machinery until their daytime somnolence has been corrected.

## 2019-09-07 ENCOUNTER — OFFICE VISIT (OUTPATIENT)
Dept: URGENT CARE | Facility: PHYSICIAN GROUP | Age: 69
End: 2019-09-07
Payer: MEDICARE

## 2019-09-07 VITALS
WEIGHT: 195.2 LBS | DIASTOLIC BLOOD PRESSURE: 86 MMHG | HEIGHT: 64 IN | OXYGEN SATURATION: 100 % | RESPIRATION RATE: 16 BRPM | TEMPERATURE: 98.4 F | HEART RATE: 87 BPM | SYSTOLIC BLOOD PRESSURE: 120 MMHG | BODY MASS INDEX: 33.32 KG/M2

## 2019-09-07 DIAGNOSIS — Z87.42 HISTORY OF VULVOVAGINITIS: ICD-10-CM

## 2019-09-07 DIAGNOSIS — M19.012 ARTHRITIS OF LEFT SHOULDER REGION: ICD-10-CM

## 2019-09-07 DIAGNOSIS — M75.82 TENDINITIS OF LEFT ROTATOR CUFF: ICD-10-CM

## 2019-09-07 PROCEDURE — 96372 THER/PROPH/DIAG INJ SC/IM: CPT | Performed by: PHYSICIAN ASSISTANT

## 2019-09-07 PROCEDURE — 99214 OFFICE O/P EST MOD 30 MIN: CPT | Mod: 25 | Performed by: PHYSICIAN ASSISTANT

## 2019-09-07 RX ORDER — NAPROXEN 500 MG/1
500 TABLET ORAL 2 TIMES DAILY WITH MEALS
Qty: 30 TAB | Refills: 0 | Status: SHIPPED | OUTPATIENT
Start: 2019-09-07 | End: 2021-01-19

## 2019-09-07 RX ORDER — KETOROLAC TROMETHAMINE 30 MG/ML
30 INJECTION, SOLUTION INTRAMUSCULAR; INTRAVENOUS ONCE
Status: COMPLETED | OUTPATIENT
Start: 2019-09-07 | End: 2019-09-07

## 2019-09-07 RX ORDER — FLUCONAZOLE 150 MG/1
TABLET ORAL
Qty: 2 TAB | Refills: 0 | Status: SHIPPED | OUTPATIENT
Start: 2019-09-07 | End: 2020-12-07

## 2019-09-07 RX ADMIN — KETOROLAC TROMETHAMINE 30 MG: 30 INJECTION, SOLUTION INTRAMUSCULAR; INTRAVENOUS at 17:02

## 2019-09-07 ASSESSMENT — ENCOUNTER SYMPTOMS: CONSTITUTIONAL NEGATIVE: 1

## 2019-09-07 NOTE — PROGRESS NOTES
Subjective:      Bella Navarro is a 69 y.o. female who presents with Shoulder Pain (L shoulder, hard to move, cant lift arm up, no strength , x1 day )        Shoulder Pain     Patient presents for about 24 hours of worsened left shoulder pain over chronic issue.  She states that it bothers her more often than not but usually not to this degree.  She states she feels swimming exacerbated this and then when she slammed her car door she felt immediate intense pain and now her ROM is significantly limited.    No numbness, tingling or weakness distally.   No fevers or chills. Has not taken anything in anticipation she may be receiving medication today.  She has consulted with Ortho in the past who did not at the time recommend surgery. She prefers not to take steroids at this time.       Review of Systems   Constitutional: Negative.    Musculoskeletal:        SEE HPI   Skin: Negative.    Endo/Heme/Allergies: Negative.        PMH:  has a past medical history of Anesthesia, Arthritis (02/2019), ASTHMA, Back pain, Bile leak, Bronchitis, Cancer ([facility]) (2018), Chickenpox, Cold (01/10/2019), Diabetes (HCC), Hypertension, Infectious disease (02/01/2019), Influenza, Kidney stone, Nasal drainage, Nephrolithiasis (2008), Obesity, Pain, Pneumonia, Restless leg syndrome, Snoring, and Tonsillitis. She also has no past medical history of CAD (coronary artery disease), COPD, or Liver disease.  MEDS:   Current Outpatient Medications:   •  fluconazole (DIFLUCAN) 150 MG tablet, 1 tablet once.  Repeat in 72 hours prn., Disp: 2 Tab, Rfl: 0  •  naproxen (NAPROSYN) 500 MG Tab, Take 1 Tab by mouth 2 times a day, with meals. PRN, Disp: 30 Tab, Rfl: 0  •  montelukast (SINGULAIR) 10 MG Tab, Take 1 Tab by mouth every day., Disp: 30 Tab, Rfl: 11  •  albuterol (PROVENTIL) 2.5mg/3ml Nebu Soln solution for nebulization, 3 mL by Nebulization route every four hours as needed for Shortness of Breath., Disp: 30 Bullet, Rfl: 3  •  albuterol 108 (90  Base) MCG/ACT Aero Soln inhalation aerosol, Inhale 2 Puffs by mouth every 6 hours as needed for Shortness of Breath., Disp: 8.5 g, Rfl: 0  •  Non Formulary Request, Take 1 Tab by mouth every day. **CHINESE HERBS**, Disp: , Rfl:   •  GARLIC PO, Take 4 Tabs by mouth 2 Times a Day., Disp: , Rfl:     Current Facility-Administered Medications:   •  ketorolac (TORADOL) injection 30 mg, 30 mg, Intramuscular, Once, Bev Eugene P.A.-CSean  ALLERGIES:   Allergies   Allergen Reactions   • Codeine Anaphylaxis   • Eggs Diarrhea and Vomiting     GI distress.   • Latex Rash     .Latex tape mostly   • Pcn [Penicillins] Shortness of Breath   • Tape      SURGHX:   Past Surgical History:   Procedure Laterality Date   • LESION EXCISION GENERAL Right 2/5/2019    Procedure: LESION EXCISION GENERAL - SQUAMOUS CELL CARCINOMA NOSE;  Surgeon: Martinez Martin M.D.;  Location: [facility];  Service: Plastics   • FLAP CLOSURE Right 2/5/2019    Procedure: FLAP CLOSURE - not done;  Surgeon: Martinez Martin M.D.;  Location: [facility];  Service: Plastics   • BREAST IMPLANT REMOVAL Bilateral 1/15/2016    Procedure: BREAST IMPLANT EXCHANGE;  Surgeon: Martinez Martin M.D.;  Location: [facility];  Service:    • CAPSULECTOMY Bilateral 1/15/2016    Procedure: CAPSULECTOMY AND CAPSULORRHAPHIES;  Surgeon: Martinez Martin M.D.;  Location: [facility];  Service:    • MASTOPEXY Bilateral 1/15/2016    Procedure: MASTOPEXY BENELLI;  Surgeon: Martinez Martin M.D.;  Location: [facility];  Service:    • ERCP  10/8/2014    Performed by Martinez Siegel M.D. at SURGERY [facility]   • ERCP IN OR  8/12/2014    Performed by Martinez Siegel M.D. at SURGERY [facility]   • DEEPALI BY LAPAROSCOPY  8/4/2014    Performed by Los Salas M.D. at [facility]   • PB ENLARGE BREAST WITH IMPLANT  1996   • CHOLECYSTECTOMY     • TONSILLECTOMY       SOCHX:  reports that she  "quit smoking about 46 years ago. Her smoking use included cigarettes. She started smoking about 51 years ago. She has a 5.00 pack-year smoking history. She has never used smokeless tobacco. She reports that she does not drink alcohol or use drugs.  FH: Family history was reviewed, no pertinent findings to report   Objective:     /86 (BP Location: Right arm, Patient Position: Sitting, BP Cuff Size: Adult)   Pulse 87   Temp 36.9 °C (98.4 °F) (Temporal)   Resp 16   Ht 1.626 m (5' 4\")   Wt 88.5 kg (195 lb 3.2 oz)   SpO2 100%   BMI 33.51 kg/m²      Physical Exam   Constitutional: She is oriented to person, place, and time. She appears well-developed and well-nourished. No distress.   HENT:   Head: Normocephalic and atraumatic.   Eyes: Conjunctivae and EOM are normal.   Neck: Normal range of motion. Neck supple.   Cardiovascular: Normal rate, regular rhythm and normal heart sounds.   Pulmonary/Chest: Effort normal and breath sounds normal. No respiratory distress.   Musculoskeletal:        Left shoulder: She exhibits decreased range of motion and decreased strength. She exhibits no swelling and normal pulse. Bony tenderness: significant, both passive and active, <25% of normal.        Arms:  Neurological: She is alert and oriented to person, place, and time.   Skin: Skin is warm and dry.   Psychiatric: She has a normal mood and affect. Her behavior is normal.   Vitals reviewed.              Assessment/Plan:     1. Tendinitis of left rotator cuff  ketorolac (TORADOL) injection 30 mg    REFERRAL TO ORTHOPEDICS   2. Arthritis of left shoulder region  ketorolac (TORADOL) injection 30 mg    naproxen (NAPROSYN) 500 MG Tab    REFERRAL TO ORTHOPEDICS   3. History of vulvovaginitis  fluconazole (DIFLUCAN) 150 MG tablet       -Toradol shot given in clinic.  Tolerated well.   -Naproxen follow up, with food, starting tomorrow.  Stay well hydrated.   -patient requests Diflucan for upcoming trip, hx of yeast infection   "   -referral to follow up with Ortho placed      Supportive care, differential diagnoses, and indications for immediate follow-up discussed with patient.   Pathogenesis of diagnosis discussed including typical length and natural progression.   Instructed to return to clinic or nearest emergency department for any change in condition, further concerns, or worsening of symptoms.  Patient states understanding of the plan of care and discharge instructions.      Bev Eugene P.A.-C.

## 2019-09-11 ENCOUNTER — SLEEP CENTER VISIT (OUTPATIENT)
Dept: SLEEP MEDICINE | Facility: MEDICAL CENTER | Age: 69
End: 2019-09-11
Payer: MEDICARE

## 2019-09-11 VITALS
DIASTOLIC BLOOD PRESSURE: 90 MMHG | BODY MASS INDEX: 33.29 KG/M2 | WEIGHT: 195 LBS | HEART RATE: 80 BPM | RESPIRATION RATE: 14 BRPM | SYSTOLIC BLOOD PRESSURE: 150 MMHG | HEIGHT: 64 IN | OXYGEN SATURATION: 97 %

## 2019-09-11 DIAGNOSIS — G47.34 NOCTURNAL HYPOXEMIA: ICD-10-CM

## 2019-09-11 DIAGNOSIS — G47.33 OBSTRUCTIVE SLEEP APNEA SYNDROME: ICD-10-CM

## 2019-09-11 DIAGNOSIS — G47.10 HYPERSOMNOLENCE: ICD-10-CM

## 2019-09-11 DIAGNOSIS — Z87.891 FORMER SMOKER: ICD-10-CM

## 2019-09-11 DIAGNOSIS — J45.20 MILD INTERMITTENT ASTHMA WITHOUT COMPLICATION: ICD-10-CM

## 2019-09-11 PROCEDURE — 99214 OFFICE O/P EST MOD 30 MIN: CPT | Performed by: INTERNAL MEDICINE

## 2019-09-15 NOTE — PROGRESS NOTES
CC: Snoring, witnessed nocturnal apnea and excessive daytime somnolence, suspected sleep apnea hypopnea syndrome.     HPI:   Ms. Navarro is a 69-year-old woman referred by Dr. Gracy Quijano to assist in evaluation and management of suspected sleep disordered breathing.  She was initially evaluated on August 28, 2019 and returns now to discuss the results of a recent sleep study.     She is followed by Dr. Quijano for health maintenance and for asthma.  She has episodes of dyspnea, minimally productive cough and wheezing that may follow upper respiratory tract infections or exposure to seasonal allergens.  She is treated with albuterol by metered-dose inhaler or nebulizer and is on montelukast daily.  She has declined a corticosteroid-containing controller agent in the past.  She is a lifelong non-smoker.  In the course of her ongoing evaluation symptoms suggesting sleep disordered breathing were identified.     She has a regular sleep schedule, as indicated by the sleep diary, with bedtime at about midnight and she falls asleep within about 10 minutes.  She awakens perhaps 3 times on an average night but quickly returns to sleep.  She arises at about 10 AM with a dry mouth but no fatigue, grogginess or regular morning headaches.  The spousal questionnaire indicates loud snoring, bruxism and sleep talking and she says that her  does sleep in a separate room because of her snoring.  She is tired during the day and may doze off during quiet moments or nap when the opportunity presents.  Her [place] sleepiness score is 7 points but does not seem to capture the degree of fatigue and sleepiness that she describes.  She drinks caffeinated beverages sparingly and does not use substances to induce sleep or to maintain wakefulness.  She has used nasal dilator strips and a chinstrap in an effort to reduce snoring but these have not been effective.  She has not previously been evaluated for sleep issues.  She does  not have symptoms suggesting parasomnia or restless leg syndrome.    The polysomnogram dated September 5, 2019 is reviewed with the patient.  It demonstrates mild obstructive sleep apnea hypopnea with an apnea hypopnea index of 9.5 events per hour including obstructive hypopnea and obstructive apnea episodes.  The index rises in supine and in rem sleep.  There is significant nocturnal hypoxemia with a lowest arterial oxygen saturation of 77% on room air and she spent 42% of the study time with a saturation below 90%.  There is also significant fragmentation of sleep.        Patient Active Problem List    Diagnosis Date Noted   • Obesity (BMI 30-39.9) 08/28/2019   • Skin aging 06/27/2019   • Osteopenia 05/07/2019   • Snoring 04/17/2019   • History of skin cancer 02/05/2019   • Chronic pain of both shoulders 05/16/2017   • Obesity (BMI 35.0-39.9 without comorbidity) 05/16/2017   • Mild intermittent asthma without complication 03/22/2016       Past Medical History:   Diagnosis Date   • Anesthesia     sensitive to medications   • Arthritis 02/2019    R thumb   • ASTHMA     does not take inhaler( cold air and cigaretts)   • Back pain    • Bile leak    • Bronchitis     hx [id number], 2010, 2019   • Cancer (HCC) 2018    skin   • Chickenpox    • Cold 01/10/2019   • Diabetes (HCC)     diet   • Hypertension    • Infectious disease 02/01/2019    flu   • Influenza    • Kidney stone    • Nasal drainage    • Nephrolithiasis 2008   • Obesity    • Pain     bilat. shoulders   • Pneumonia    • Restless leg syndrome    • Snoring    • Tonsillitis        Past Surgical History:   Procedure Laterality Date   • LESION EXCISION GENERAL Right 2/5/2019    Procedure: LESION EXCISION GENERAL - SQUAMOUS CELL CARCINOMA NOSE;  Surgeon: Martinez Martin M.D.;  Location: SURGERY [facility];  Service: Plastics   • FLAP CLOSURE Right 2/5/2019    Procedure: FLAP CLOSURE - not done;  Surgeon: Martinez Martin M.D.;  [facility]: [facility];  Service: Plastics   • BREAST IMPLANT REMOVAL Bilateral 1/15/2016    Procedure: BREAST IMPLANT EXCHANGE;  Surgeon: Martinez Martin M.D.;  Location: SURGERY [facility];  Service:    • CAPSULECTOMY Bilateral 1/15/2016    Procedure: CAPSULECTOMY AND CAPSULORRHAPHIES;  Surgeon: Martinez Martin M.D.;  Location: SURGERY [facility];  Service:    • MASTOPEXY Bilateral 1/15/2016    Procedure: MASTOPEXY BENELLI;  Surgeon: Martinez Martin M.D.;  Location: SURGERY [facility];  Service:    • ERCP  10/8/2014    Performed by Martinez Siegel M.D. at SURGERY [facility]   • ERCP IN OR  2014    Performed by Martinez Siegel M.D. at SURGERY [facility]   • DEEPALI BY LAPAROSCOPY  2014    Performed by Los Salas M.D. at SURGERY [facility]   • PB ENLARGE BREAST WITH IMPLANT     • CHOLECYSTECTOMY     • TONSILLECTOMY         Family History   Problem Relation Age of Onset   • Stroke Mother    • Heart Attack Father        Social History     Socioeconomic History   • Marital status:      Spouse name: Not on file   • Number of children: Not on file   • Years of education: Not on file   • Highest education level: Not on file   Occupational History   • Not on file   Social Needs   • Financial resource strain: Not on file   • Food insecurity:     Worry: Not on file     Inability: Not on file   • Transportation needs:     Medical: Not on file     Non-medical: Not on file   Tobacco Use   • Smoking status: Former Smoker     Packs/day: 1.00     Years: 5.00     Pack years: 5.00     Types: Cigarettes     Start date: 1968     Last attempt to quit: 1973     Years since quittin.7   • Smokeless tobacco: Never Used   • Tobacco comment: continue to avoid   Substance and Sexual Activity   • Alcohol use: No     Alcohol/week: 0.0 oz   • Drug use: No   • Sexual activity: Not on file   Lifestyle   • Physical activity:     Days per week: Not on file     Minutes per session: Not  "on file   • Stress: Not on file   Relationships   • Social connections:     Talks on phone: Not on file     Gets together: Not on file     Attends Sikhism service: Not on file     Active member of club or organization: Not on file     Attends meetings of clubs or organizations: Not on file     Relationship status: Not on file   • Intimate partner violence:     Fear of current or ex partner: Not on file     Emotionally abused: Not on file     Physically abused: Not on file     Forced sexual activity: Not on file   Other Topics Concern   • Not on file   Social History Narrative   • Not on file       Current Outpatient Medications   Medication Sig Dispense Refill   • fluconazole (DIFLUCAN) 150 MG tablet 1 tablet once.  Repeat in 72 hours prn. 2 Tab 0   • naproxen (NAPROSYN) 500 MG Tab Take 1 Tab by mouth 2 times a day, with meals. PRN 30 Tab 0   • montelukast (SINGULAIR) 10 MG Tab Take 1 Tab by mouth every day. 30 Tab 11   • albuterol (PROVENTIL) 2.5mg/3ml Nebu Soln solution for nebulization 3 mL by Nebulization route every four hours as needed for Shortness of Breath. 30 Bullet 3   • albuterol 108 (90 Base) MCG/ACT Aero Soln inhalation aerosol Inhale 2 Puffs by mouth every 6 hours as needed for Shortness of Breath. 8.5 g 0   • GARLIC PO Take 4 Tabs by mouth 2 Times a Day.     • Non Formulary Request Take 1 Tab by mouth every day. **CHINESE HERBS**       No current facility-administered medications for this visit.     \"CURRENT RX\"      Allergies: Codeine; Eggs; Latex; Pcn [penicillins]; and Tape      ROS  Unchanged from prior and positive for the sleep, respiratory and dermatologic issues reviewed above.  She wears corrective lenses but has no diplopia.  She has seasonal and allergic rhinitis without tinnitus or loss of auditory acuity.  She denies heartburn or abdominal discomfort, chest pain or palpitations.  She does have some arthralgias and back pain.  All other aspects of the CPT review of systems process are " "negative.      Physical Exam:   /90 (BP Location: Right arm, Patient Position: Sitting, BP Cuff Size: Adult)   Pulse 80   Resp 14   Ht 1.626 m (5' 4\")   Wt 88.5 kg (195 lb)   SpO2 97%   BMI 33.47 kg/m²    Head and neck examination demonstrates no mucosal lesion, purulent drainage or evident polyps. The pharynx is benign with a Mallampati III presentation. The neck is supple without thyromegaly. On chest examination there are symmetrical bilateral breath sounds without rales, wheezing or consolidation. On cardiac examination, the apical impulse and heart sounds are normal and the rhythm is regular. There is no murmur, gallop or rub and no jugular venous distention. The abdomen is soft with active bowel sounds and no palpable hepatosplenomegaly, mass, guarding or rebound. The extremities show no clubbing, cyanosis or edema and no signs of deep venous thrombosis. There is no warmth, redness, tenderness or palpable venous cord in the calves. The skin is clear, warm and dry. There is no unusual peripheral lymphadenopathy. Peripheral pulses are palpable in all 4 extremities. On neurologic examination, cranial nerve function is intact, motor tone is symmetrical, and the patient is alert, oriented and responsive.       Problems:  1. Obstructive sleep apnea syndrome  She presented with snoring and excessive daytime somnolence as well as a crowded posterior pharyngeal airway and a body mass index of 33.  The polysomnogram confirms mild obstructive sleep apnea hypopnea with an apnea hypotony index of 9.5 events per hour and a lowest arterial oxygen saturation of 77%.  Effective treatment is required and only to improve daytime alertness but also to reduce the cardiac and neurologic risks associated with untreated sleep apnea hypopnea.  We have talked again about treatment options including airway pressurization, dental appliances, otolaryngologic surgery and behavioral measures including positional management and " weight loss.  She is very reluctant to initiate CPAP therapy and would like to investigate a dental appliance.  - REFERRAL TO DENTISTRY  - Overnight Home Sleep Study; Future    2. Nocturnal hypoxemia  Related to the sleep disordered breathing.    3. Hypersomnolence  Probably related to the sleep disordered breathing.  She is spending sufficient nightly time in bed and does not seem to have sleep hygiene issues.    4. Mild intermittent asthma without complication  Her symptoms are stable at this time with occasional use of the rescue inhaler and no evidence for infection or other complications.  Given the persistence of her symptoms however I have encouraged her to consider the use of a steroid-containing controller inhaler and emphasized that in reasonable doses of those medications do not usually result in systemic side effects.  She did receive the influenza vaccination last year and will be due again this fall.  She has received the first pneumococcal vaccination and would be a candidate for the second injection.  Vaccine in the sleep clinic this will be deferred to her primary care provider.    5. Former smoker    6. BMI 33.0-33.9,adult      Plan:   1.  Referral to Dr. Andi Castrejon for evaluation and a possible oral device for treatment of sleep apnea.    2.  I have emphasized the importance of a follow-up home sleep test after she has acclimated to the dental appliance to confirm efficacy treatment.    3.  Return visit here in about 4 months with a home sleep test beforehand.    4.  Follow-up with Dr. Quijano as scheduled.    We appreciate the opportunity to assist in her care.  Return in about 4 months (around 1/11/2020).

## 2020-10-15 ENCOUNTER — HOSPITAL ENCOUNTER (OUTPATIENT)
Dept: RADIOLOGY | Facility: MEDICAL CENTER | Age: 70
End: 2020-10-15
Attending: PHYSICIAN ASSISTANT
Payer: MEDICARE

## 2020-10-15 DIAGNOSIS — S99.911S UNSPECIFIED INJURY OF RIGHT ANKLE, SEQUELA: ICD-10-CM

## 2020-10-15 PROCEDURE — 73590 X-RAY EXAM OF LOWER LEG: CPT | Mod: RT

## 2020-10-15 PROCEDURE — 73610 X-RAY EXAM OF ANKLE: CPT | Mod: RT

## 2020-12-07 ENCOUNTER — TELEMEDICINE (OUTPATIENT)
Dept: MEDICAL GROUP | Facility: PHYSICIAN GROUP | Age: 70
End: 2020-12-07
Payer: MEDICARE

## 2020-12-07 VITALS
HEIGHT: 64 IN | HEART RATE: 73 BPM | WEIGHT: 202 LBS | SYSTOLIC BLOOD PRESSURE: 130 MMHG | DIASTOLIC BLOOD PRESSURE: 87 MMHG | BODY MASS INDEX: 34.49 KG/M2

## 2020-12-07 DIAGNOSIS — R05.9 COUGH: ICD-10-CM

## 2020-12-07 DIAGNOSIS — R51.9 NONINTRACTABLE HEADACHE, UNSPECIFIED CHRONICITY PATTERN, UNSPECIFIED HEADACHE TYPE: ICD-10-CM

## 2020-12-07 PROCEDURE — 99214 OFFICE O/P EST MOD 30 MIN: CPT | Mod: 95,CR,CS | Performed by: FAMILY MEDICINE

## 2020-12-07 RX ORDER — DOXYCYCLINE HYCLATE 100 MG
100 TABLET ORAL 2 TIMES DAILY
Qty: 10 TAB | Refills: 0 | Status: SHIPPED | OUTPATIENT
Start: 2020-12-07 | End: 2020-12-12

## 2020-12-07 RX ORDER — FLUCONAZOLE 150 MG/1
150 TABLET ORAL DAILY
Qty: 1 TAB | Refills: 0 | Status: SHIPPED | OUTPATIENT
Start: 2020-12-07 | End: 2020-12-08

## 2020-12-07 ASSESSMENT — PATIENT HEALTH QUESTIONNAIRE - PHQ9: CLINICAL INTERPRETATION OF PHQ2 SCORE: 0

## 2020-12-08 ENCOUNTER — HOSPITAL ENCOUNTER (OUTPATIENT)
Dept: LAB | Facility: MEDICAL CENTER | Age: 70
End: 2020-12-08
Attending: FAMILY MEDICINE
Payer: MEDICARE

## 2020-12-08 DIAGNOSIS — R51.9 NONINTRACTABLE HEADACHE, UNSPECIFIED CHRONICITY PATTERN, UNSPECIFIED HEADACHE TYPE: ICD-10-CM

## 2020-12-08 DIAGNOSIS — R05.9 COUGH: ICD-10-CM

## 2020-12-08 PROCEDURE — U0003 INFECTIOUS AGENT DETECTION BY NUCLEIC ACID (DNA OR RNA); SEVERE ACUTE RESPIRATORY SYNDROME CORONAVIRUS 2 (SARS-COV-2) (CORONAVIRUS DISEASE [COVID-19]), AMPLIFIED PROBE TECHNIQUE, MAKING USE OF HIGH THROUGHPUT TECHNOLOGIES AS DESCRIBED BY CMS-2020-01-R: HCPCS

## 2020-12-08 PROCEDURE — C9803 HOPD COVID-19 SPEC COLLECT: HCPCS

## 2020-12-08 NOTE — PROGRESS NOTES
"Virtual Visit: Established Patient   This visit was conducted via Zoom using secure and encrypted videoconferencing technology. The patient was in a private location in the state of [place].    The patient's identity was confirmed and verbal consent was obtained for this virtual visit.    Subjective:   CC:   Chief Complaint   Patient presents with   • Headache     \"sinus\" / about 4-5 days   • Otalgia     right side   • Cough     productive       Bella Navarro is a 70 y.o. female presenting for evaluation and management of:    Patient is here today with concerns for \"sinus infection\".  States that for about the past 4 days or so she has had a frontal headache with some minimal congestion.  She is also having some right-sided ear pain as well as a mild productive cough.  States that she gets sinus infections every year and gets a Z-Valentin for them.  She denies fevers, chills, purulent nasal discharge.  She has no myalgias, loss of taste or smell.  No nausea, vomiting, diarrhea.  Symptoms are notably mild.  She has not had contact with anyone with COVID-19, and her  is not sick who lives with her.    ROS   Denies any recent fevers or chills. No nausea or vomiting. No chest pains or shortness of breath.     Allergies   Allergen Reactions   • Codeine Anaphylaxis   • Eggs Diarrhea and Vomiting     GI distress.   • Latex Rash     .Latex tape mostly   • Pcn [Penicillins] Shortness of Breath   • Tape        Current medicines (including changes today)  Current Outpatient Medications   Medication Sig Dispense Refill   • doxycycline (VIBRAMYCIN) 100 MG Tab Take 1 Tab by mouth 2 times a day for 5 days. 10 Tab 0   • fluconazole (DIFLUCAN) 150 MG tablet Take 1 Tab by mouth every day for 1 day. 1 Tab 0   • naproxen (NAPROSYN) 500 MG Tab Take 1 Tab by mouth 2 times a day, with meals. PRN 30 Tab 0   • albuterol 108 (90 Base) MCG/ACT Aero Soln inhalation aerosol Inhale 2 Puffs by mouth every 6 hours as needed for Shortness " "of Breath. 8.5 g 0   • GARLIC PO Take 4 Tabs by mouth 2 Times a Day.       No current facility-administered medications for this visit.        Patient Active Problem List    Diagnosis Date Noted   • Obesity (BMI 30-39.9) 08/28/2019   • Skin aging 06/27/2019   • Osteopenia 05/07/2019   • Snoring 04/17/2019   • History of skin cancer 02/05/2019   • Chronic pain of both shoulders 05/16/2017   • Obesity (BMI 35.0-39.9 without comorbidity) 05/16/2017   • Mild intermittent asthma without complication 03/22/2016       Family History   Problem Relation Age of Onset   • Stroke Mother    • Heart Attack Father        She  has a past medical history of Anesthesia, Arthritis (02/2019), ASTHMA, Back pain, Bile leak, Bronchitis, Cancer ([facility]) (2018), Chickenpox, Cold (01/10/2019), Diabetes (HCC), Hypertension, Infectious disease (02/01/2019), Influenza, Kidney stone, Nasal drainage, Nephrolithiasis (2008), Obesity, Pain, Pneumonia, Restless leg syndrome, Snoring, and Tonsillitis. She also has no past medical history of CAD (coronary artery disease), COPD, or Liver disease.  She  has a past surgical history that includes tonsillectomy; pr enlarge breast with implant (1996); lynn by laparoscopy (8/4/2014); ercp in or (8/12/2014); ercp (10/8/2014); breast implant removal (Bilateral, 1/15/2016); capsulectomy (Bilateral, 1/15/2016); mastopexy (Bilateral, 1/15/2016); lesion excision general (Right, 2/5/2019); flap closure (Right, 2/5/2019); and cholecystectomy.       Objective:   /87 (BP Location: Left arm, Patient Position: Sitting, BP Cuff Size: Adult)   Pulse 73   Ht 1.626 m (5' 4\")   Wt 91.6 kg (202 lb)   BMI 34.67 kg/m²     Physical Exam:  Constitutional: Alert, no distress, well-groomed.  Skin: No rashes in visible areas.  Eye: Round. Conjunctiva clear, lids normal. No icterus.   ENMT: Lips pink without lesions, good dentition, moist mucous membranes. Phonation normal.  Neck: No masses, no thyromegaly. Moves freely " without pain.  Respiratory: Unlabored respiratory effort, no cough or audible wheeze  Psych: Alert and oriented x3, normal affect and mood.       Assessment and Plan:   The following treatment plan was discussed:     1. Nonintractable headache, unspecified chronicity pattern, unspecified headache type  2. Cough  Patient has very mild symptoms at this time of frontal headache and mild cough.  She is asking for Z-Valentin today.  I did explain to her that Z-Paks do not treat sinus infections.  In addition, this far into her illness with mild symptoms I doubt she has true bacterial sinusitis.  I did send a rescue prescription in for doxycycline but have advised her not to use it immediately, but to wait 48 to 72 hours to see if symptoms begin to improve.  I did also order a COVID-19 test at this time given high prevalence of COVID-19 in the community.  - doxycycline (VIBRAMYCIN) 100 MG Tab; Take 1 Tab by mouth 2 times a day for 5 days.  Dispense: 10 Tab; Refill: 0  - COVID/SARS COV-2 PCR; Future    Other orders  - fluconazole (DIFLUCAN) 150 MG tablet; Take 1 Tab by mouth every day for 1 day.  Dispense: 1 Tab; Refill: 0        Follow-up: Return if symptoms worsen or fail to improve.

## 2020-12-09 LAB
COVID ORDER STATUS COVID19: NORMAL
SARS-COV-2 RNA RESP QL NAA+PROBE: NOTDETECTED
SPECIMEN SOURCE: NORMAL

## 2021-01-19 ENCOUNTER — TELEMEDICINE (OUTPATIENT)
Dept: MEDICAL GROUP | Facility: PHYSICIAN GROUP | Age: 71
End: 2021-01-19
Payer: MEDICARE

## 2021-01-19 VITALS
DIASTOLIC BLOOD PRESSURE: 98 MMHG | WEIGHT: 206 LBS | SYSTOLIC BLOOD PRESSURE: 178 MMHG | HEIGHT: 64 IN | BODY MASS INDEX: 35.17 KG/M2

## 2021-01-19 DIAGNOSIS — I10 BENIGN ESSENTIAL HTN: ICD-10-CM

## 2021-01-19 PROCEDURE — 99214 OFFICE O/P EST MOD 30 MIN: CPT | Mod: 95,CR | Performed by: FAMILY MEDICINE

## 2021-01-19 RX ORDER — LISINOPRIL 20 MG/1
20 TABLET ORAL DAILY
Qty: 30 TAB | Refills: 5 | Status: SHIPPED
Start: 2021-01-19 | End: 2021-01-27

## 2021-01-19 ASSESSMENT — PATIENT HEALTH QUESTIONNAIRE - PHQ9: CLINICAL INTERPRETATION OF PHQ2 SCORE: 0

## 2021-01-19 NOTE — PROGRESS NOTES
Virtual Visit: Established Patient   This visit was conducted via Zoom using secure and encrypted videoconferencing technology. The patient was in a private location in the [facility].    The patient's identity was confirmed and verbal consent was obtained for this virtual visit.    Subjective:   CC:   Chief Complaint   Patient presents with   • Hypertension   • URI     sinus congestion / headache       Bella Navarro is a 71 y.o. female presenting for evaluation and management of:    Patient is here today with concerns for high blood pressure.  Notes that blood pressure at home has been running in the 170s over 90s fairly persistently.  She notes that blood pressure has been mildly elevated for a couple of years now, though typically much lower, maybe in the 140s systolic.  She is open to starting a medication at this time.  She does note that she has been having headaches in the past week though she attributes this to cold symptoms that she has been having and a possible sinus infection.  She otherwise denies any symptoms such as chest pain, blurry vision, neurologic symptoms or shortness of breath.    ROS   Denies any recent fevers or chills. No nausea or vomiting. No chest pains or shortness of breath.     Allergies   Allergen Reactions   • Codeine Anaphylaxis   • Eggs Diarrhea and Vomiting     GI distress.   • Latex Rash     .Latex tape mostly   • Pcn [Penicillins] Shortness of Breath   • Tape        Current medicines (including changes today)  Current Outpatient Medications   Medication Sig Dispense Refill   • lisinopril (PRINIVIL) 20 MG Tab Take 1 Tab by mouth every day. 30 Tab 5   • albuterol 108 (90 Base) MCG/ACT Aero Soln inhalation aerosol Inhale 2 Puffs by mouth every 6 hours as needed for Shortness of Breath. 8.5 g 0   • GARLIC PO Take 4 Tabs by mouth 2 Times a Day.       No current facility-administered medications for this visit.        Patient Active Problem List    Diagnosis Date  "Noted   • Obesity (BMI 30-39.9) 08/28/2019   • Skin aging 06/27/2019   • Osteopenia 05/07/2019   • Snoring 04/17/2019   • History of skin cancer 02/05/2019   • Chronic pain of both shoulders 05/16/2017   • Obesity (BMI 35.0-39.9 without comorbidity) 05/16/2017   • Mild intermittent asthma without complication 03/22/2016       Family History   Problem Relation Age of Onset   • Stroke Mother    • Heart Attack Father        She  has a past medical history of Anesthesia, Arthritis (02/2019), ASTHMA, Back pain, Bile leak, Bronchitis, Cancer (HCC) (2018), Chickenpox, Cold (01/10/2019), Diabetes (HCC), Hypertension, Infectious disease (02/01/2019), Influenza, Kidney stone, Nasal drainage, Nephrolithiasis (2008), Obesity, Pain, Pneumonia, Restless leg syndrome, Snoring, and Tonsillitis. She also has no past medical history of CAD (coronary artery disease), COPD, or Liver disease.  She  has a past surgical history that includes tonsillectomy; pr breast augmentation with implant (1996); lynn by laparoscopy (8/4/2014); ercp in or (8/12/2014); ercp (10/8/2014); breast implant removal (Bilateral, 1/15/2016); capsulectomy (Bilateral, 1/15/2016); mastopexy (Bilateral, 1/15/2016); lesion excision general (Right, 2/5/2019); flap closure (Right, 2/5/2019); and cholecystectomy.       Objective:   BP (!) 178/98 (BP Location: Left arm, Patient Position: Supine, BP Cuff Size: Adult)   Ht 1.626 m (5' 4\")   Wt 93.4 kg (206 lb)   BMI 35.36 kg/m²     Physical Exam:  Constitutional: Alert, no distress, well-groomed.  Skin: No rashes in visible areas.  Eye: Round. Conjunctiva clear, lids normal. No icterus.   ENMT: Lips pink without lesions, good dentition, moist mucous membranes. Phonation normal.  Neck: No masses, no thyromegaly. Moves freely without pain.  Respiratory: Unlabored respiratory effort, no cough or audible wheeze  Psych: Alert and oriented x3, normal affect and mood.       Assessment and Plan:   The following treatment plan " was discussed:     1. Benign essential HTN  New issue to me.  Uncontrolled.  Patient is definitely in a range where she needs to start a blood pressure medication which she is open to doing so.  I am going to go ahead and start lisinopril 20 mg at this time.  I had like to closely follow-up in 1 week and have her monitor her blood pressures until that time.  She is agreeable to this plan.  Did advise her that most likely were going to need to increase the lisinopril, even possibly add second antihypertensive ultimately, based on what her blood pressure is at right now.  Lifestyle changes as well discussed at length with patient.  Red flag symptoms reviewed.  - lisinopril (PRINIVIL) 20 MG Tab; Take 1 Tab by mouth every day.  Dispense: 30 Tab; Refill: 5      Follow-up: Return in about 1 week (around 1/26/2021).

## 2021-01-22 ENCOUNTER — PATIENT MESSAGE (OUTPATIENT)
Dept: MEDICAL GROUP | Facility: PHYSICIAN GROUP | Age: 71
End: 2021-01-22

## 2021-01-27 ENCOUNTER — TELEMEDICINE (OUTPATIENT)
Dept: MEDICAL GROUP | Facility: PHYSICIAN GROUP | Age: 71
End: 2021-01-27
Payer: MEDICARE

## 2021-01-27 VITALS
HEIGHT: 64 IN | SYSTOLIC BLOOD PRESSURE: 146 MMHG | DIASTOLIC BLOOD PRESSURE: 84 MMHG | BODY MASS INDEX: 35.17 KG/M2 | WEIGHT: 206 LBS

## 2021-01-27 DIAGNOSIS — Z13.220 ENCOUNTER FOR LIPID SCREENING FOR CARDIOVASCULAR DISEASE: ICD-10-CM

## 2021-01-27 DIAGNOSIS — Z13.6 ENCOUNTER FOR LIPID SCREENING FOR CARDIOVASCULAR DISEASE: ICD-10-CM

## 2021-01-27 DIAGNOSIS — I10 BENIGN ESSENTIAL HTN: ICD-10-CM

## 2021-01-27 PROCEDURE — 99214 OFFICE O/P EST MOD 30 MIN: CPT | Mod: 95,CR | Performed by: FAMILY MEDICINE

## 2021-01-27 RX ORDER — LISINOPRIL 10 MG/1
10 TABLET ORAL DAILY
Qty: 90 TAB | Refills: 3 | Status: SHIPPED | OUTPATIENT
Start: 2021-01-27 | End: 2021-05-05 | Stop reason: SDUPTHER

## 2021-01-27 RX ORDER — LISINOPRIL 5 MG/1
5 TABLET ORAL DAILY
Qty: 90 TAB | Refills: 3 | Status: SHIPPED | OUTPATIENT
Start: 2021-01-27 | End: 2021-05-05 | Stop reason: SDUPTHER

## 2021-01-27 NOTE — PROGRESS NOTES
Virtual Visit: Established Patient   This visit was conducted via [employer] using secure and encrypted videoconferencing technology. The patient was in a private location in the state of [place].    The patient's identity was confirmed and verbal consent was obtained for this virtual visit.    Subjective:   CC:   Chief Complaint   Patient presents with   • Follow-Up     hypertension / [phone]       Bella Navarro is a 71 y.o. female presenting for evaluation and management of:    Patient is here on virtual visit to follow-up on her blood pressure.  We prescribed her 20 mg of lisinopril at her last visit his blood pressure was quite highly elevated.  Unfortunately, she did somewhat bottom out on that dose of lisinopril and become lightheaded and nauseated.  She had since been taking half a tablet, 10 mg daily of the lisinopril and is feeling just fine on this dose.  Blood pressure is still somewhat elevated though above goal.    ROS   Denies any recent fevers or chills. No nausea or vomiting. No chest pains or shortness of breath.     Allergies   Allergen Reactions   • Codeine Anaphylaxis   • Eggs Diarrhea and Vomiting     GI distress.   • Latex Rash     .Latex tape mostly   • Pcn [Penicillins] Shortness of Breath   • Tape        Current medicines (including changes today)  Current Outpatient Medications   Medication Sig Dispense Refill   • lisinopril (PRINIVIL) 10 MG Tab Take 1 Tab by mouth every day. Take in combination with 5 mg dose for total 15 mg. 90 Tab 3   • lisinopril (PRINIVIL) 5 MG Tab Take 1 Tab by mouth every day. 90 Tab 3   • albuterol 108 (90 Base) MCG/ACT Aero Soln inhalation aerosol Inhale 2 Puffs by mouth every 6 hours as needed for Shortness of Breath. 8.5 g 0   • GARLIC PO Take 4 Tabs by mouth 2 Times a Day.       No current facility-administered medications for this visit.        Patient Active Problem List    Diagnosis Date Noted   • Obesity (BMI 30-39.9) 08/28/2019   • Skin aging  "06/27/2019   • Osteopenia 05/07/2019   • Snoring 04/17/2019   • History of skin cancer 02/05/2019   • Chronic pain of both shoulders 05/16/2017   • Obesity (BMI 35.0-39.9 without comorbidity) 05/16/2017   • Mild intermittent asthma without complication 03/22/2016       Family History   Problem Relation Age of Onset   • Stroke Mother    • Heart Attack Father        She  has a past medical history of Anesthesia, Arthritis (02/2019), ASTHMA, Back pain, Bile leak, Bronchitis, Cancer (HCC) (2018), Chickenpox, Cold (01/10/2019), Diabetes (HCC), Hypertension, Infectious disease (02/01/2019), Influenza, Kidney stone, Nasal drainage, Nephrolithiasis (2008), Obesity, Pain, Pneumonia, Restless leg syndrome, Snoring, and Tonsillitis. She also has no past medical history of CAD (coronary artery disease), COPD, or Liver disease.  She  has a past surgical history that includes tonsillectomy; pr breast augmentation with implant (1996); lynn by laparoscopy (8/4/2014); ercp in or (8/12/2014); ercp (10/8/2014); breast implant removal (Bilateral, 1/15/2016); capsulectomy (Bilateral, 1/15/2016); mastopexy (Bilateral, 1/15/2016); lesion excision general (Right, 2/5/2019); flap closure (Right, 2/5/2019); and cholecystectomy.       Objective:   /84 (BP Location: Left arm, Patient Position: Sitting, BP Cuff Size: Adult)   Ht 1.626 m (5' 4\")   Wt 93.4 kg (206 lb)   BMI 35.36 kg/m²     Physical Exam:  Constitutional: Alert, no distress, well-groomed.  Skin: No rashes in visible areas.  Eye: Round. Conjunctiva clear, lids normal. No icterus.   ENMT: Lips pink without lesions, good dentition, moist mucous membranes. Phonation normal.  Neck: No masses, no thyromegaly. Moves freely without pain.  Respiratory: Unlabored respiratory effort, no cough or audible wheeze  Psych: Alert and oriented x3, normal affect and mood.       Assessment and Plan:   The following treatment plan was discussed:     1. Encounter for lipid screening for " cardiovascular disease  - Lipid Profile; Future    2. Benign essential HTN  Chronic issue, remains above goal.  I definitely hesitant to him raise her lisinopril back up to 20 mg at this time.  We will go ahead and increase dose to 15 mg.  Patient agrees to send my chart message in about 7 to 10 days to let me know how her blood pressure is doing.  She is also due for lab work which she agrees to get done soon.  - lisinopril (PRINIVIL) 10 MG Tab; Take 1 Tab by mouth every day. Take in combination with 5 mg dose for total 15 mg.  Dispense: 90 Tab; Refill: 3  - lisinopril (PRINIVIL) 5 MG Tab; Take 1 Tab by mouth every day.  Dispense: 90 Tab; Refill: 3  - Comp Metabolic Panel; Future  - MICROALBUMIN CREAT RATIO URINE; Future        Follow-up: Return in about 6 months (around 7/27/2021), or if symptoms worsen or fail to improve.

## 2021-02-10 ENCOUNTER — NURSE TRIAGE (OUTPATIENT)
Dept: HEALTH INFORMATION MANAGEMENT | Facility: OTHER | Age: 71
End: 2021-02-10

## 2021-02-10 ENCOUNTER — TELEMEDICINE (OUTPATIENT)
Dept: TELEHEALTH | Facility: TELEMEDICINE | Age: 71
End: 2021-02-10
Payer: MEDICARE

## 2021-02-10 DIAGNOSIS — R50.9 FEVER AND CHILLS: ICD-10-CM

## 2021-02-10 DIAGNOSIS — R19.7 DIARRHEA OF PRESUMED INFECTIOUS ORIGIN: ICD-10-CM

## 2021-02-10 DIAGNOSIS — R11.0 NAUSEA: ICD-10-CM

## 2021-02-10 PROCEDURE — 99213 OFFICE O/P EST LOW 20 MIN: CPT | Mod: 95,CR | Performed by: NURSE PRACTITIONER

## 2021-02-10 NOTE — TELEPHONE ENCOUNTER
"  Pt requesting virtual appt to be assessed for diarrhea.  Instructed patient on how to make a virtual appt.     Reason for Disposition  • MODERATE diarrhea (e.g., 4-6 times / day more than normal) and present > 48 hours (2 days)    Additional Information  • Negative: Shock suspected (e.g., cold/pale/clammy skin, too weak to stand, low BP, rapid pulse)  • Negative: Difficult to awaken or acting confused (e.g., disoriented, slurred speech)  • Negative: Sounds like a life-threatening emergency to the triager  • Negative: Vomiting also present and worse than the diarrhea  • Negative: Blood in stool and without diarrhea  • Negative: SEVERE abdominal pain (e.g., excruciating) and present > 1 hour  • Negative: SEVERE abdominal pain and age > 60  • Negative: Bloody, black, or tarry bowel movements  • Negative: SEVERE diarrhea (e.g., 7 or more times / day more than normal) and age > 60 years  • Negative: Constant abdominal pain lasting > 2 hours  • Negative: Drinking very little and has signs of dehydration (e.g., no urine > 12 hours, very dry mouth, very lightheaded)  • Negative: Patient sounds very sick or weak to the triager  • Negative: SEVERE diarrhea (e.g., 7 or more times / day more than normal) and present > 24 hours (1 day)    Answer Assessment - Initial Assessment Questions  1. DIARRHEA SEVERITY: \"How bad is the diarrhea?\" \"How many extra stools have you had in the past 24 hours than normal?\"     - NO DIARRHEA (SCALE 0)    - MILD (SCALE 1-3): Few loose or mushy BMs; increase of 1-3 stools over normal daily number of stools; mild increase in ostomy output.    -  MODERATE (SCALE 4-7): Increase of 4-6 stools daily over normal; moderate increase in ostomy output.  * SEVERE (SCALE 8-10; OR 'WORST POSSIBLE'): Increase of 7 or more stools daily over normal; moderate increase in ostomy output; incontinence.      moderated  2. ONSET: \"When did the diarrhea begin?\"      3 days ago  3. BM CONSISTENCY: \"How loose or watery is " "the diarrhea?\"     watery  4. VOMITING: \"Are you also vomiting?\" If so, ask: \"How many times in the past 24 hours?\"     no  5. ABDOMINAL PAIN: \"Are you having any abdominal pain?\" If yes: \"What does it feel like?\" (e.g., crampy, dull, intermittent, constant)     Abdominal cramping  6. ABDOMINAL PAIN SEVERITY: If present, ask: \"How bad is the pain?\"  (e.g., Scale 1-10; mild, moderate, or severe)    - MILD (1-3): doesn't interfere with normal activities, abdomen soft and not tender to touch     - MODERATE (4-7): interferes with normal activities or awakens from sleep, tender to touch     - SEVERE (8-10): excruciating pain, doubled over, unable to do any normal activities        4-5/10  7. ORAL INTAKE: If vomiting, \"Have you been able to drink liquids?\" \"How much fluids have you had in the past 24 hours?\"     No problems   8. HYDRATION: \"Any signs of dehydration?\" (e.g., dry mouth [not just dry lips], too weak to stand, dizziness, new weight loss) \"When did you last urinate?\"      Urinating good.  Weak and 3 pounds weight loss  9. EXPOSURE: \"Have you traveled to a foreign country recently?\" \"Have you been exposed to anyone with diarrhea?\" \"Could you have eaten any food that was spoiled?\"      no  10. ANTIBIOTIC USE: \"Are you taking antibiotics now or have you taken antibiotics in the past 2 months?\"  'Maybe\"  11. OTHER SYMPTOMS: \"Do you have any other symptoms?\" (e.g., fever, blood in stool)        no  12. PREGNANCY: \"Is there any chance you are pregnant?\" \"When was your last menstrual period?\"       NA    Protocols used: DIARRHEA-A-OH      "

## 2021-02-11 ENCOUNTER — HOSPITAL ENCOUNTER (OUTPATIENT)
Dept: LAB | Facility: MEDICAL CENTER | Age: 71
End: 2021-02-11
Attending: NURSE PRACTITIONER
Payer: MEDICARE

## 2021-02-11 DIAGNOSIS — R19.7 DIARRHEA OF PRESUMED INFECTIOUS ORIGIN: ICD-10-CM

## 2021-02-11 DIAGNOSIS — R11.0 NAUSEA: ICD-10-CM

## 2021-02-11 DIAGNOSIS — R50.9 FEVER AND CHILLS: ICD-10-CM

## 2021-02-11 PROCEDURE — U0003 INFECTIOUS AGENT DETECTION BY NUCLEIC ACID (DNA OR RNA); SEVERE ACUTE RESPIRATORY SYNDROME CORONAVIRUS 2 (SARS-COV-2) (CORONAVIRUS DISEASE [COVID-19]), AMPLIFIED PROBE TECHNIQUE, MAKING USE OF HIGH THROUGHPUT TECHNOLOGIES AS DESCRIBED BY CMS-2020-01-R: HCPCS

## 2021-02-11 PROCEDURE — C9803 HOPD COVID-19 SPEC COLLECT: HCPCS

## 2021-02-11 PROCEDURE — U0005 INFEC AGEN DETEC AMPLI PROBE: HCPCS

## 2021-02-11 NOTE — PROGRESS NOTES
Virtual Visit: Established Patient   This visit was conducted via Zoom using secure and encrypted videoconferencing technology. The patient was in a private location in the state of [place].    The patient's identity was confirmed and verbal consent was obtained for this virtual visit.    Subjective:   CC: diarrhea x3 days    Bella Navarro is a 71 y.o. female presenting for evaluation and management of: Diarrhea over the past 3 days.  Patient states that she initially had watery diarrhea for approximately 24 hours, which seemed to lessen when she began to follow the BRAT diet, as well as putting fresh chopped gerardo in her navel.  The patient states that that her initially watery stools improved to loose bowel movements for approximately 24 hours, then have returned to being watery once again.  However, yesterday patient states that her stools began to become watery once again.  She is unable to check her temperature, but states that she has woke up perspiring as well as at the chills over the past 2 days.  Denies any known ill contacts, however both her and her  like to eat out at restaurants several times a week.  Patient states her  has not had any of the same symptoms, despite sharing a plate of ordered food at the restaurant 3 days ago.  Denies abdominal pain, dysuria or vomiting.      ROS   Denies chest pains or shortness of breath.     Allergies   Allergen Reactions   • Codeine Anaphylaxis   • Eggs Diarrhea and Vomiting     GI distress.   • Latex Rash     .Latex tape mostly   • Pcn [Penicillins] Shortness of Breath   • Tape        Current medicines (including changes today)  Current Outpatient Medications   Medication Sig Dispense Refill   • lisinopril (PRINIVIL) 10 MG Tab Take 1 Tab by mouth every day. Take in combination with 5 mg dose for total 15 mg. 90 Tab 3   • lisinopril (PRINIVIL) 5 MG Tab Take 1 Tab by mouth every day. 90 Tab 3   • albuterol 108 (90 Base) MCG/ACT Aero Soln  inhalation aerosol Inhale 2 Puffs by mouth every 6 hours as needed for Shortness of Breath. 8.5 g 0   • GARLIC PO Take 4 Tabs by mouth 2 Times a Day.       No current facility-administered medications for this visit.       Patient Active Problem List    Diagnosis Date Noted   • Obesity (BMI 30-39.9) 08/28/2019   • Skin aging 06/27/2019   • Osteopenia 05/07/2019   • Snoring 04/17/2019   • History of skin cancer 02/05/2019   • Chronic pain of both shoulders 05/16/2017   • Obesity (BMI 35.0-39.9 without comorbidity) 05/16/2017   • Mild intermittent asthma without complication 03/22/2016       Family History   Problem Relation Age of Onset   • Stroke Mother    • Heart Attack Father        She  has a past medical history of Anesthesia, Arthritis (02/2019), ASTHMA, Back pain, Bile leak, Bronchitis, Cancer ([facility]) (2018), Chickenpox, Cold (01/10/2019), Diabetes (HCC), Hypertension, Infectious disease (02/01/2019), Influenza, Kidney stone, Nasal drainage, Nephrolithiasis (2008), Obesity, Pain, Pneumonia, Restless leg syndrome, Snoring, and Tonsillitis. She also has no past medical history of CAD (coronary artery disease), COPD, or Liver disease.  She  has a past surgical history that includes tonsillectomy; pr breast augmentation with implant (1996); lynn by laparoscopy (8/4/2014); ercp in or (8/12/2014); ercp (10/8/2014); breast implant removal (Bilateral, 1/15/2016); capsulectomy (Bilateral, 1/15/2016); mastopexy (Bilateral, 1/15/2016); lesion excision general (Right, 2/5/2019); flap closure (Right, 2/5/2019); and cholecystectomy.       Objective:   There were no vitals taken for this visit.    Physical Exam:  Constitutional: Alert, no distress, well-groomed.  Skin: No rashes in visible areas.  Eye: Round. Conjunctiva clear, lids normal. No icterus.   ENMT: Lips pink without lesions, good dentition, moist mucous membranes. Phonation normal.  Neck: No masses, no thyromegaly. Moves freely without pain.  Respiratory:  Unlabored respiratory effort, no cough or audible wheeze  Psych: Alert and oriented x3, normal affect and mood.       Assessment and Plan:   The following treatment plan was discussed:     1. Diarrhea of presumed infectious origin  - SARS-CoV-2 PCR (24 hour In-House): Collect NP swab in VTM; Future    2. Nausea  - SARS-CoV-2 PCR (24 hour In-House): Collect NP swab in VTM; Future    3. Fever and chills  - SARS-CoV-2 PCR (24 hour In-House): Collect NP swab in VTM; Future    Discussed patient's symptoms with her for possible viral etiology to include Covid.  May additionally be a bacterial etiology.  She will continue the BRAT diet, in addition to trialing Imodium over-the-counter.  Push fluids as tolerated.  Will obtain Covid testing, advised to self quarantine until Covid test results return. Discussed management options (risks, benefits, and alternatives to treatment).     Advised the patient to follow-up with the primary care provider for recheck, reevaluation, and/or consideration of further management if necessary. Return to urgent care with any worsening symptoms or if there is no improvement in their current condition. Red flags discussed and indications to immediately call 911 or present to the Emergency Department.  All questions were encouraged and answered to the patient's satisfaction and understanding, and they agree to the plan of care.     I personally reviewed prior external notes and test results pertinent to today's visit.  I have independently reviewed and interpreted all diagnostics ordered during this urgent care acute visit. Time spent evaluating this patient was a minimum of 30 minutes and includes preparing for visit, counseling/education, exam and evaluation, obtaining history, independent interpretation and ordering lab/test/procedures.      Please note that this dictation was created using voice recognition software. I have made a reasonable attempt to correct obvious errors, but I expect  that there are errors of grammar and possibly content that I did not discover before finalizing the note.

## 2021-03-03 DIAGNOSIS — Z23 NEED FOR VACCINATION: ICD-10-CM

## 2021-03-11 ENCOUNTER — IMMUNIZATION (OUTPATIENT)
Dept: FAMILY PLANNING/WOMEN'S HEALTH CLINIC | Facility: IMMUNIZATION CENTER | Age: 71
End: 2021-03-11
Attending: INTERNAL MEDICINE
Payer: MEDICARE

## 2021-03-11 DIAGNOSIS — Z23 ENCOUNTER FOR VACCINATION: Primary | ICD-10-CM

## 2021-03-11 DIAGNOSIS — Z23 NEED FOR VACCINATION: ICD-10-CM

## 2021-03-11 PROCEDURE — 91300 PFIZER SARS-COV-2 VACCINE: CPT

## 2021-03-11 PROCEDURE — 0001A PFIZER SARS-COV-2 VACCINE: CPT

## 2021-03-15 ENCOUNTER — TELEMEDICINE (OUTPATIENT)
Dept: MEDICAL GROUP | Facility: PHYSICIAN GROUP | Age: 71
End: 2021-03-15
Payer: MEDICARE

## 2021-03-15 VITALS
SYSTOLIC BLOOD PRESSURE: 133 MMHG | DIASTOLIC BLOOD PRESSURE: 72 MMHG | HEIGHT: 64 IN | BODY MASS INDEX: 34.66 KG/M2 | WEIGHT: 203 LBS

## 2021-03-15 DIAGNOSIS — R09.81 SINUS CONGESTION: ICD-10-CM

## 2021-03-15 DIAGNOSIS — I10 BENIGN ESSENTIAL HTN: ICD-10-CM

## 2021-03-15 DIAGNOSIS — H92.02 LEFT EAR PAIN: ICD-10-CM

## 2021-03-15 PROCEDURE — 99213 OFFICE O/P EST LOW 20 MIN: CPT | Mod: 95,CR | Performed by: FAMILY MEDICINE

## 2021-03-15 RX ORDER — FLUTICASONE PROPIONATE 50 MCG
1 SPRAY, SUSPENSION (ML) NASAL DAILY
Qty: 16 G | Refills: 11 | Status: SHIPPED
Start: 2021-03-15 | End: 2021-05-26

## 2021-03-15 RX ORDER — HYDROCODONE BITARTRATE AND ACETAMINOPHEN 5; 300 MG/1; MG/1
TABLET ORAL
COMMUNITY
End: 2021-03-15

## 2021-03-15 RX ORDER — KETOROLAC TROMETHAMINE 30 MG/ML
INJECTION, SOLUTION INTRAMUSCULAR; INTRAVENOUS
COMMUNITY
End: 2021-03-15

## 2021-03-15 NOTE — PROGRESS NOTES
CC:  ***    HISTORY OF THE PRESENT ILLNESS: Patient is a 71 y.o. female.     ***    Allergies: Codeine, Eggs, Latex, Pcn [penicillins], and Tape    Current Outpatient Medications Ordered in Epic   Medication Sig Dispense Refill   • lisinopril (PRINIVIL) 10 MG Tab Take 1 Tab by mouth every day. Take in combination with 5 mg dose for total 15 mg. 90 Tab 3   • lisinopril (PRINIVIL) 5 MG Tab Take 1 Tab by mouth every day. 90 Tab 3   • albuterol 108 (90 Base) MCG/ACT Aero Soln inhalation aerosol Inhale 2 Puffs by mouth every 6 hours as needed for Shortness of Breath. 8.5 g 0   • GARLIC PO Take 4 Tabs by mouth 2 Times a Day.       No current [facility]-ordered facility-administered medications on file.       Past Medical History:   Diagnosis Date   • Anesthesia     sensitive to medications   • Arthritis 02/2019    R thumb   • ASTHMA     does not take inhaler( cold air and cigaretts)   • Back pain    • Bile leak    • Bronchitis     hx [id number], 2010, 2019   • Cancer ([facility]) 2018    skin   • Chickenpox    • Cold 01/10/2019   • Diabetes ([facility])     diet   • Hypertension    • Infectious disease 02/01/2019    flu   • Influenza    • Kidney stone    • Nasal drainage    • Nephrolithiasis 2008   • Obesity    • Pain     bilat. shoulders   • Pneumonia    • Restless leg syndrome    • Snoring    • Tonsillitis        Past Surgical History:   Procedure Laterality Date   • LESION EXCISION GENERAL Right 2/5/2019    Procedure: LESION EXCISION GENERAL - SQUAMOUS CELL CARCINOMA NOSE;  Surgeon: Martinez Martin M.D.;  Location: [facility];  Service: Plastics   • FLAP CLOSURE Right 2/5/2019    Procedure: FLAP CLOSURE - not done;  Surgeon: Martinez Martin M.D.;  Location: [facility];  Service: Plastics   • BREAST IMPLANT REMOVAL Bilateral 1/15/2016    Procedure: BREAST IMPLANT EXCHANGE;  Surgeon: Martinez Martin M.D.;  Location: [facility];  Service:    • CAPSULECTOMY Bilateral 1/15/2016    Procedure:  "CAPSULECTOMY AND CAPSULORRHAPHIES;  Surgeon: Martinez Martin M.D.;  Location: SURGERY [facility];  Service:    • MASTOPEXY Bilateral 1/15/2016    Procedure: MASTOPEXY BENELLI;  Surgeon: Martinez Martin M.D.;  Location: [facility];  Service:    • ERCP  10/8/2014    Performed by Martinez Siegel M.D. at SURGERY [facility]   • ERCP IN OR  2014    Performed by Martinez Siegel M.D. at SURGERY [facility]   • DEEPALI BY LAPAROSCOPY  2014    Performed by Los Salas M.D. at SURGERY [facility]   • TX BREAST AUGMENTATION WITH IMPLANT     • CHOLECYSTECTOMY     • TONSILLECTOMY         Social History     Tobacco Use   • Smoking status: Former Smoker     Packs/day: 1.00     Years: 5.00     Pack years: 5.00     Types: Cigarettes     Start date: 1968     Quit date: 1973     Years since quittin.2   • Smokeless tobacco: Never Used   • Tobacco comment: continue to avoid   Substance Use Topics   • Alcohol use: No     Alcohol/week: 0.0 oz   • Drug use: No       Social History     Social History Narrative   • Not on file       Family History   Problem Relation Age of Onset   • Stroke Mother    • Heart Attack Father        ROS:   Denies fever, chest pain, shortness of breath      Labs: ***  Imaging: ***    Exam: /72 (BP Location: Left arm, Patient Position: Sitting, BP Cuff Size: Adult)   Ht 1.626 m (5' 4\")   Wt 92.1 kg (203 lb)  Body mass index is 34.84 kg/m².    General: Well appearing, NAD  HEENT: Normocephalic. Conjunctiva clear, lids without ptosis, pupils equal and reactive to light accommodation, ears normal shape and contour, canals are clear bilaterally, tympanic membranes without bulging or erythema and normal cone of light, oropharynx is without erythema, edema or exudates.   Neck: Supple without JVD. No thyromegaly or nodules  Pulmonary: Clear to ausculation.  Normal effort. No rales, ronchi, or wheezing.  Cardiovascular: Regular rate and rhythm " without murmur, rubs or gallop.   Abdomen: Soft, nontender, nondistended. Normal bowel sounds. Liver and spleen are not palpable. No rebound or guarding  Neurologic: normal gait  Lymph: No cervical, supraclavicular or axillary lymph nodes are palpable  Skin: Warm and dry.  No obvious lesions.  Musculoskeletal:  No extremity cyanosis, clubbing, or edema.  Psych: Normal mood and affect. Alert and oriented. Judgment and insight is normal.    Please note that this dictation was created using voice recognition software. I have made every reasonable attempt to correct obvious errors, but I expect that there are errors of grammar and possibly content that I did not discover before finalizing the note.      Assessment/Plan  There are no diagnoses linked to this encounter.      Gracy Quijano,   [facility]

## 2021-03-15 NOTE — PROGRESS NOTES
Virtual Visit: Established Patient   This visit was conducted via Zoom using secure and encrypted videoconferencing technology. The patient was in a private location in the state of [place].    The patient's identity was confirmed and verbal consent was obtained for this virtual visit.    Subjective:   CC:   Chief Complaint   Patient presents with   • Hypertension     follow up   • Otalgia     left ear       Bella Navarro is a 71 y.o. female presenting for evaluation and management of:    Patient is here today to follow-up on her blood pressure.  She has been using a wrist cuff at home and notes blood pressure to be somewhat labile.  Notes lowest blood pressures been in the 120s systolic.  Sometimes spiking up into the 170s systolic.  Think she mostly averages around 130s to 140 systolic.  Again though, blood pressures have been fairly labile.  She is doing fine on lisinopril 50 mg daily.    Patient also complaining of left ear pain.  Notes that the pain is been ongoing on and off for about 3 weeks now.  She does note concurrent sinus congestion.  She has been intermittently using some over-the-counter remedies such as sinus rinses.  No fevers or chills.  Did recently have COVID-19 vaccine, but notes that she was feeling cold afterwards, but ear pain predated this symptom.    ROS   Denies any recent fevers or chills. No nausea or vomiting. No chest pains or shortness of breath.     Allergies   Allergen Reactions   • Codeine Anaphylaxis   • Eggs Diarrhea and Vomiting     GI distress.   • Latex Rash     .Latex tape mostly   • Pcn [Penicillins] Shortness of Breath   • Tape        Current medicines (including changes today)  Current Outpatient Medications   Medication Sig Dispense Refill   • fluticasone (FLONASE) 50 MCG/ACT nasal spray Administer 1 Spray into affected nostril(S) every day. 16 g 11   • lisinopril (PRINIVIL) 10 MG Tab Take 1 Tab by mouth every day. Take in combination with 5 mg dose for total 15  "mg. 90 Tab 3   • lisinopril (PRINIVIL) 5 MG Tab Take 1 Tab by mouth every day. 90 Tab 3   • albuterol 108 (90 Base) MCG/ACT Aero Soln inhalation aerosol Inhale 2 Puffs by mouth every 6 hours as needed for Shortness of Breath. 8.5 g 0   • GARLIC PO Take 4 Tabs by mouth 2 Times a Day.       No current facility-administered medications for this visit.       Patient Active Problem List    Diagnosis Date Noted   • Benign essential HTN 08/09/2014     Priority: Medium   • Obesity (BMI 30-39.9) 08/28/2019   • Skin aging 06/27/2019   • Osteopenia 05/07/2019   • Snoring 04/17/2019   • History of skin cancer 02/05/2019   • Chronic pain of both shoulders 05/16/2017   • Obesity (BMI 35.0-39.9 without comorbidity) 05/16/2017   • Mild intermittent asthma without complication 03/22/2016       Family History   Problem Relation Age of Onset   • Stroke Mother    • Heart Attack Father        She  has a past medical history of Anesthesia, Arthritis (02/2019), ASTHMA, Back pain, Bile leak, Bronchitis, Cancer (HCC) (2018), Chickenpox, Cold (01/10/2019), Diabetes (HCC), Hypertension, Infectious disease (02/01/2019), Influenza, Kidney stone, Nasal drainage, Nephrolithiasis (2008), Obesity, Pain, Pneumonia, Restless leg syndrome, Snoring, and Tonsillitis. She also has no past medical history of CAD (coronary artery disease), COPD, or Liver disease.  She  has a past surgical history that includes tonsillectomy; pr breast augmentation with implant (1996); lynn by laparoscopy (8/4/2014); ercp in or (8/12/2014); ercp (10/8/2014); breast implant removal (Bilateral, 1/15/2016); capsulectomy (Bilateral, 1/15/2016); mastopexy (Bilateral, 1/15/2016); lesion excision general (Right, 2/5/2019); flap closure (Right, 2/5/2019); and cholecystectomy.       Objective:   /72 (BP Location: Left arm, Patient Position: Sitting, BP Cuff Size: Adult)   Ht 1.626 m (5' 4\")   Wt 92.1 kg (203 lb)   BMI 34.84 kg/m²     Physical Exam:  Constitutional: Alert, " no distress, well-groomed.  Skin: No rashes in visible areas.  Eye: Round. Conjunctiva clear, lids normal. No icterus.   ENMT: Lips pink without lesions, good dentition, moist mucous membranes. Phonation normal.  Neck: No masses, no thyromegaly. Moves freely without pain.  Respiratory: Unlabored respiratory effort, no cough or audible wheeze  Psych: Alert and oriented x3, normal affect and mood.       Assessment and Plan:   The following treatment plan was discussed:     1. Sinus congestion  2. Left ear pain  Acute complaints.  I am somewhat limited given that I cannot do an ear exam via virtual visit.  However, I do not necessarily think that an infection would have symptoms that wax and wane over the course of 3 weeks.  She is having some sinus issues as well.  I would recommend that we trial Flonase first for 1 to 2 weeks, and if symptoms do not improve, for her to let me know.  - fluticasone (FLONASE) 50 MCG/ACT nasal spray; Administer 1 Spray into affected nostril(S) every day.  Dispense: 16 g; Refill: 11    3. Benign essential HTN  Chronic issue, I actually recommend that she begin monitoring blood pressure with a well fitting arm cuff, as wrist cuffs can be somewhat labile.  She agrees to do this and agrees to send me a Trove message with blood pressure readings x1 week.  May need higher dose of lisinopril, but like to get more accurate readings.      Follow-up: Return in about 3 months (around 6/15/2021), or if symptoms worsen or fail to improve.

## 2021-04-02 ENCOUNTER — IMMUNIZATION (OUTPATIENT)
Dept: FAMILY PLANNING/WOMEN'S HEALTH CLINIC | Facility: IMMUNIZATION CENTER | Age: 71
End: 2021-04-02
Attending: INTERNAL MEDICINE
Payer: MEDICARE

## 2021-04-02 DIAGNOSIS — Z23 ENCOUNTER FOR VACCINATION: Primary | ICD-10-CM

## 2021-04-02 PROCEDURE — 0002A PFIZER SARS-COV-2 VACCINE: CPT

## 2021-04-02 PROCEDURE — 91300 PFIZER SARS-COV-2 VACCINE: CPT

## 2021-05-05 DIAGNOSIS — I10 BENIGN ESSENTIAL HTN: ICD-10-CM

## 2021-05-06 DIAGNOSIS — I10 BENIGN ESSENTIAL HTN: ICD-10-CM

## 2021-05-10 RX ORDER — LISINOPRIL 10 MG/1
10 TABLET ORAL DAILY
Qty: 90 TABLET | Refills: 1 | Status: SHIPPED | OUTPATIENT
Start: 2021-05-10 | End: 2021-11-19 | Stop reason: SDUPTHER

## 2021-05-10 RX ORDER — LISINOPRIL 5 MG/1
5 TABLET ORAL DAILY
Qty: 90 TABLET | Refills: 3 | OUTPATIENT
Start: 2021-05-10

## 2021-05-10 RX ORDER — LISINOPRIL 10 MG/1
10 TABLET ORAL DAILY
Qty: 90 TABLET | Refills: 3 | OUTPATIENT
Start: 2021-05-10

## 2021-05-10 RX ORDER — LISINOPRIL 5 MG/1
5 TABLET ORAL DAILY
Qty: 90 TABLET | Refills: 1 | Status: SHIPPED | OUTPATIENT
Start: 2021-05-10 | End: 2021-11-19 | Stop reason: SDUPTHER

## 2021-05-10 NOTE — TELEPHONE ENCOUNTER
Refill X 6 months, sent to pharmacy.Pt. Seen in the last 6 months per protocol.   Lab Results   Component Value Date/Time    SODIUM 139 02/01/2019 11:11 AM    POTASSIUM 4.2 02/01/2019 11:11 AM    CHLORIDE 109 02/01/2019 11:11 AM    CO2 20 02/01/2019 11:11 AM    GLUCOSE 101 (H) 02/01/2019 11:11 AM    BUN 14 02/01/2019 11:11 AM    CREATININE 0.72 02/01/2019 11:11 AM    CREATININE 0.9 05/05/2009 04:30 AM

## 2021-05-26 ENCOUNTER — OFFICE VISIT (OUTPATIENT)
Dept: MEDICAL GROUP | Facility: PHYSICIAN GROUP | Age: 71
End: 2021-05-26
Payer: MEDICARE

## 2021-05-26 VITALS
DIASTOLIC BLOOD PRESSURE: 66 MMHG | HEIGHT: 64 IN | OXYGEN SATURATION: 94 % | SYSTOLIC BLOOD PRESSURE: 124 MMHG | TEMPERATURE: 98.5 F | HEART RATE: 100 BPM | BODY MASS INDEX: 35 KG/M2 | WEIGHT: 205 LBS

## 2021-05-26 DIAGNOSIS — H65.93 FLUID LEVEL BEHIND TYMPANIC MEMBRANE OF BOTH EARS: ICD-10-CM

## 2021-05-26 DIAGNOSIS — Z12.31 ENCOUNTER FOR SCREENING MAMMOGRAM FOR BREAST CANCER: ICD-10-CM

## 2021-05-26 DIAGNOSIS — L82.0 INFLAMED SEBORRHEIC KERATOSIS: ICD-10-CM

## 2021-05-26 DIAGNOSIS — R42 VERTIGO: ICD-10-CM

## 2021-05-26 PROCEDURE — 99214 OFFICE O/P EST MOD 30 MIN: CPT | Mod: 25 | Performed by: FAMILY MEDICINE

## 2021-05-26 PROCEDURE — 17110 DESTRUCTION B9 LES UP TO 14: CPT | Performed by: FAMILY MEDICINE

## 2021-05-26 NOTE — PROGRESS NOTES
CC: Vertigo    HISTORY OF THE PRESENT ILLNESS: Patient is a 71 y.o. female.     Patient is here today with concern for dizziness.  She does have a history of benign positional vertigo.  She has been doing exercises at home to try to alleviate the vertigo but they have not seem to be helping.  She does note that recently she has had either allergies or possible cold symptoms with congestion and cough.  She is vaccinated against Covid.  Vertigo is most present when she turns her head to either side.  She has had some ear pain recently.  She denies any tinnitus or hearing loss out of the ordinary.    She also has a skin lesion on her right calf which can get inflamed if pants are rubbing against it.    Allergies: Codeine, Eggs, Latex, Pcn [penicillins], and Tape    Current Outpatient Medications Ordered in Epic   Medication Sig Dispense Refill   • lisinopril (PRINIVIL) 10 MG Tab Take 1 tablet by mouth every day. Take in combination with 5 mg dose for total 15 mg. 90 tablet 1   • albuterol 108 (90 Base) MCG/ACT Aero Soln inhalation aerosol Inhale 2 Puffs by mouth every 6 hours as needed for Shortness of Breath. 8.5 g 0   • GARLIC PO Take 4 Tabs by mouth 2 Times a Day.     • lisinopril (PRINIVIL) 5 MG Tab Take 1 tablet by mouth every day. 90 tablet 1     No current Epic-ordered facility-administered medications on file.       Past Medical History:   Diagnosis Date   • Anesthesia     sensitive to medications   • Arthritis 02/2019    R thumb   • ASTHMA     does not take inhaler( cold air and cigaretts)   • Back pain    • Bile leak    • Bronchitis     hx [id number], 2010, 2019   • Cancer ([facility]) 2018    skin   • Chickenpox    • Cold 01/10/2019   • Diabetes ([facility])     diet   • Hypertension    • Infectious disease 02/01/2019    flu   • Influenza    • Kidney stone    • Nasal drainage    • Nephrolithiasis 2008   • Obesity    • Pain     bilat. shoulders   • Pneumonia    • Restless leg syndrome    • Snoring    • Tonsillitis         Past Surgical History:   Procedure Laterality Date   • LESION EXCISION GENERAL Right 2019    Procedure: LESION EXCISION GENERAL - SQUAMOUS CELL CARCINOMA NOSE;  Surgeon: Martinez Martin M.D.;  Location: [facility];  Service: Plastics   • FLAP CLOSURE Right 2019    Procedure: FLAP CLOSURE - not done;  Surgeon: Martinez Martin M.D.;  Location: [facility];  Service: Plastics   • BREAST IMPLANT REMOVAL Bilateral 1/15/2016    Procedure: BREAST IMPLANT EXCHANGE;  Surgeon: Martinez Martin M.D.;  Location: [facility];  Service:    • CAPSULECTOMY Bilateral 1/15/2016    Procedure: CAPSULECTOMY AND CAPSULORRHAPHIES;  Surgeon: Martinez Martin M.D.;  Location: [facility];  Service:    • MASTOPEXY Bilateral 1/15/2016    Procedure: MASTOPEXY BENELLI;  Surgeon: Martinez Martin M.D.;  Location: [facility];  Service:    • ERCP  10/8/2014    Performed by Martinez Siegel M.D. at SURGERY [facility]   • ERCP IN OR  2014    Performed by Martinez Siegel M.D. at SURGERY [facility]   • DEEPALI BY LAPAROSCOPY  2014    Performed by [place] Salas M.D. at [facility]   • [place] BREAST AUGMENTATION WITH IMPLANT     • CHOLECYSTECTOMY     • TONSILLECTOMY         Social History     Tobacco Use   • Smoking status: Former Smoker     Packs/day: 1.00     Years: 5.00     Pack years: 5.00     Types: Cigarettes     Start date: 1968     Quit date: 1973     Years since quittin.4   • Smokeless tobacco: Never Used   • Tobacco comment: continue to avoid   Vaping Use   • Vaping Use: Never used   Substance Use Topics   • Alcohol use: No     Alcohol/week: 0.0 oz   • Drug use: No       Social History     Social History Narrative   • Not on file       Family History   Problem Relation Age of Onset   • Stroke Mother    • Heart Attack Father        ROS:   Denies fever, chest pain, shortness of breath    Exam: /66 (BP  "Location: Left arm, Patient Position: Sitting, BP Cuff Size: Large adult)   Pulse 100   Temp 36.9 °C (98.5 °F) (Temporal)   Ht 1.626 m (5' 4\")   Wt 93 kg (205 lb)   SpO2 94%  Body mass index is 35.19 kg/m².    General: Well appearing, NAD  HEENT: Normocephalic. Conjunctiva clear, lids without ptosis, pupils equal and reactive to light accommodation, ears normal shape and contour, canals are clear bilaterally, she has bilateral serous effusions  Neck: Supple without JVD. No thyromegaly or nodules  Pulmonary: Clear to ausculation.  Normal effort. No rales, ronchi, or wheezing.  Cardiovascular: Regular rate and rhythm without murmur, rubs or gallop.   Lymph: No cervical, supraclavicular or axillary lymph nodes are palpable  Neurologic: No nystagmus with Pedro-Hallpike, though she does have some notable dizziness when turning head side to side.  Skin: Warm and dry.  Seborrheic keratoses noted on right calf.  Musculoskeletal:  No extremity cyanosis, clubbing, or edema.  Psych: Normal mood and affect. Alert and oriented. Judgment and insight is normal.    Please note that this dictation was created using voice recognition software. I have made every reasonable attempt to correct obvious errors, but I expect that there are errors of grammar and possibly content that I did not discover before finalizing the note.      Assessment/Plan  Bella was seen today for vertigo.    Diagnoses and all orders for this visit:    Vertigo  Fluid level behind tympanic membrane of both ears  Patient is here today with symptoms of vertigo in the setting of either allergies or mild respiratory infection.  Given that it is somewhat positional with turning the head, I do suspect that there is a component of positional vertigo to this.  She also has bilateral serous effusions, which may be contributing.  No signs of infection which needs to be treated.  Recommend using antihistamine such as Claritin or Zyrtec on a daily basis, and symptoms " should self resolve.  She can also continue to do her vertigo exercises that she already knows.    Encounter for screening mammogram for breast cancer  -     MA-SCREENING MAMMO BILAT W/TOMOSYNTHESIS W/CAD; Future    Inflamed seborrheic keratosis  Noted on exam today.  Cryotherapy performed as below.    Cryotherapy of Benign Skin Lesion Procedure Note    Procedure Details   The risks and benefits of the procedure and verbal informed consent obtained. The area was cleaned with alcohol. Three applications via liquid nitrogen cryogun applied to skin lesion x3.  No complications.  Aftercare discussed.    Follow-up in 1 year or sooner if symptoms not improving.    Gracy Quijano, DO  [facility]

## 2021-11-19 DIAGNOSIS — I10 BENIGN ESSENTIAL HTN: ICD-10-CM

## 2021-11-19 RX ORDER — LISINOPRIL 5 MG/1
5 TABLET ORAL DAILY
Qty: 90 TABLET | Refills: 1 | Status: SHIPPED | OUTPATIENT
Start: 2021-11-19 | End: 2022-08-09

## 2021-11-19 RX ORDER — LISINOPRIL 10 MG/1
10 TABLET ORAL DAILY
Qty: 90 TABLET | Refills: 1 | Status: SHIPPED | OUTPATIENT
Start: 2021-11-19 | End: 2022-08-09

## 2021-12-29 ENCOUNTER — OFFICE VISIT (OUTPATIENT)
Dept: MEDICAL GROUP | Facility: PHYSICIAN GROUP | Age: 71
End: 2021-12-29
Payer: MEDICARE

## 2021-12-29 VITALS
BODY MASS INDEX: 33.29 KG/M2 | HEIGHT: 64 IN | OXYGEN SATURATION: 96 % | DIASTOLIC BLOOD PRESSURE: 76 MMHG | SYSTOLIC BLOOD PRESSURE: 122 MMHG | WEIGHT: 195 LBS | TEMPERATURE: 98.4 F | HEART RATE: 105 BPM

## 2021-12-29 DIAGNOSIS — E66.9 OBESITY (BMI 30-39.9): ICD-10-CM

## 2021-12-29 DIAGNOSIS — J45.20 MILD INTERMITTENT ASTHMA WITHOUT COMPLICATION: ICD-10-CM

## 2021-12-29 DIAGNOSIS — M85.80 OSTEOPENIA, UNSPECIFIED LOCATION: ICD-10-CM

## 2021-12-29 DIAGNOSIS — I10 BENIGN ESSENTIAL HTN: ICD-10-CM

## 2021-12-29 DIAGNOSIS — Z12.31 ENCOUNTER FOR SCREENING MAMMOGRAM FOR BREAST CANCER: ICD-10-CM

## 2021-12-29 DIAGNOSIS — E78.00 HIGH CHOLESTEROL: ICD-10-CM

## 2021-12-29 DIAGNOSIS — Z00.00 MEDICARE ANNUAL WELLNESS VISIT, SUBSEQUENT: Primary | ICD-10-CM

## 2021-12-29 DIAGNOSIS — R73.03 PREDIABETES: ICD-10-CM

## 2021-12-29 DIAGNOSIS — Z85.828 HISTORY OF SKIN CANCER: ICD-10-CM

## 2021-12-29 DIAGNOSIS — Z00.00 ENCOUNTER FOR PREVENTIVE CARE: ICD-10-CM

## 2021-12-29 DIAGNOSIS — R53.83 OTHER FATIGUE: ICD-10-CM

## 2021-12-29 PROCEDURE — G0439 PPPS, SUBSEQ VISIT: HCPCS | Performed by: FAMILY MEDICINE

## 2021-12-29 ASSESSMENT — ENCOUNTER SYMPTOMS: GENERAL WELL-BEING: FAIR

## 2021-12-29 ASSESSMENT — ACTIVITIES OF DAILY LIVING (ADL): BATHING_REQUIRES_ASSISTANCE: 0

## 2021-12-29 ASSESSMENT — PATIENT HEALTH QUESTIONNAIRE - PHQ9: CLINICAL INTERPRETATION OF PHQ2 SCORE: 0

## 2021-12-29 NOTE — PROGRESS NOTES
Chief Complaint   Patient presents with   • Annual Wellness Visit         HPI:  Bella is a 71 y.o. here for Medicare Annual Wellness Visit    Patient is here for annual wellness visit today.  She would like for me to check her ears as well.    She reports that she has been on the keto diet for about a month now which she is had about a 10 pound weight loss on.    Patient Active Problem List    Diagnosis Date Noted   • High cholesterol 12/29/2021   • Prediabetes 12/29/2021   • Obesity (BMI 30-39.9) 08/28/2019   • Skin aging 06/27/2019   • Osteopenia 05/07/2019   • Snoring 04/17/2019   • History of skin cancer 02/05/2019   • Chronic pain of both shoulders 05/16/2017   • Mild intermittent asthma without complication 03/22/2016   • Benign essential HTN 08/09/2014       Current Outpatient Medications   Medication Sig Dispense Refill   • lisinopril (PRINIVIL) 10 MG Tab Take 1 Tablet by mouth every day. Take in combination with 5 mg dose for total 15 mg. 90 Tablet 1   • lisinopril (PRINIVIL) 5 MG Tab Take 1 Tablet by mouth every day. 90 Tablet 1   • albuterol 108 (90 Base) MCG/ACT Aero Soln inhalation aerosol Inhale 2 Puffs by mouth every 6 hours as needed for Shortness of Breath. 8.5 g 0   • GARLIC PO Take 4 Tabs by mouth 2 Times a Day.       No current facility-administered medications for this visit.        Patient is taking medications as noted in medication list.  Current supplements as per medication list.     Allergies: Codeine, Eggs, Latex, Pcn [penicillins], and Tape    Current social contact/activities: Grandchildren     Is patient current with immunizations? Yes.    She  reports that she quit smoking about 49 years ago. Her smoking use included cigarettes. She started smoking about 54 years ago. She has a 5.00 pack-year smoking history. She has never used smokeless tobacco. She reports that she does not drink alcohol and does not use drugs.  Counseling given: Not Answered  Comment: continue to  avoid        DPA/Advanced directive: Patient does not have an Advanced Directive.  A packet and workshop information was given on Advanced Directives.    ROS:    Gait: Uses no assistive device   Ostomy: No   Other tubes: No   Amputations: No   Chronic oxygen use No   Last eye exam 2021   Wears hearing aids: No   : Denies any urinary leakage during the last 6 months      Screening:        Depression Screening    Little interest or pleasure in doing things?  0 - not at all  Feeling down, depressed, or hopeless? 0 - not at all  Patient Health Questionnaire Score: 0    If depressive symptoms identified deferred to follow up visit unless specifically addressed in assessment and plan.    Interpretation of PHQ-9 Total Score   Score Severity   1-4 No Depression   5-9 Mild Depression   10-14 Moderate Depression   15-19 Moderately Severe Depression   20-27 Severe Depression    Screening for Cognitive Impairment    Three Minute Recall (captain, garden, picture)  2/3    Ron clock face with all 12 numbers and set the hands to show 5 past 8.  Yes    If cognitive concerns identified, deferred for follow up unless specifically addressed in assessment and plan.    Fall Risk Assessment    Has the patient had two or more falls in the last year or any fall with injury in the last year?  No  If fall risk identified, deferred for follow up unless specifically addressed in assessment and plan.    Safety Assessment    Throw rugs on floor.  Yes  Handrails on all stairs.  Yes  Good lighting in all hallways.  Yes  Difficulty hearing.  No  Patient counseled about all safety risks that were identified.    Functional Assessment ADLs    Are there any barriers preventing you from cooking for yourself or meeting nutritional needs?  No.    Are there any barriers preventing you from driving safely or obtaining transportation?  No.    Are there any barriers preventing you from using a telephone or calling for help?  No.    Are there any barriers  preventing you from shopping?  No.    Are there any barriers preventing you from taking care of your own finances?  No.    Are there any barriers preventing you from managing your medications?  No.    Are there any barriers preventing you from showering, bathing or dressing yourself?  No.    Are you currently engaging in any exercise or physical activity?  No.     What is your perception of your health?  Fair.    Health Maintenance Summary          Overdue - HEPATITIS C SCREENING (Once) Overdue - never done    No completion history exists for this topic.          Overdue - IMM PNEUMOCOCCAL VACCINE: 65+ Years (1 of 2 - PPSV23) Overdue - never done    No completion history exists for this topic.          Overdue - IMM ZOSTER VACCINES (1 of 2) Overdue - never done    No completion history exists for this topic.          Ordered - MAMMOGRAM (Yearly) Ordered on 12/29/2021 05/07/2019  MA-MAMMO SCREEN BILAT IMPLANTS [place] CAD    08/01/2017  MA-MAMMO SCREEN BILAT IMPLANTS [place] CAD          Overdue - IMM INFLUENZA (1) Overdue - never done    No completion history exists for this topic.          BONE DENSITY (Every 5 Years) Tentatively due on 5/7/2024 05/07/2019  DS-BONE DENSITY STUDY (DEXA)          IMM DTaP/Tdap/Td Vaccine (2 - Td or Tdap) Next due on 8/18/2026 08/18/2016  Imm Admin: Tdap Vaccine          COLORECTAL CANCER SCREENING (COLONOSCOPY - Every 10 Years) Tentatively due on 5/16/2027 05/16/2017  REFERRAL TO GI FOR COLONOSCOPY          IMM HEP B VACCINE (Series Information) Aged Out    09/11/2006  Imm Admin: Hep A/HEP B Combined Vaccine ([employer]ix)          COVID-19 Vaccine (Series Information) Completed    10/06/2021  Imm Admin: Pfizer SARS-CoV-2 Vaccine 12+    04/02/2021  Imm Admin: Pfizer SARS-CoV-2 Vaccine    03/11/2021  Imm Admin: Pfizer SARS-CoV-2 Vaccine          Annual Wellness Visit  Completed    12/29/2021  Visit Dx: Medicare annual wellness visit, subsequent    12/29/2021  Level of Service:  ANNUAL WELLNESS VISIT-INCLUDES PPPS SUBSEQUE*          IMM MENINGOCOCCAL VACCINE (MCV4) (Series Information) Aged Out    No completion history exists for this topic.          Discontinued - PAP SMEAR  Discontinued    No completion history exists for this topic.                Patient Care Team:  Gracy Quijano D.O. as PCP - General (Family Medicine)  Ang Gregorio M.D. as Consulting Physician (Allergy and Immunology)  Martinez Siegel M.D. as Consulting Physician (Gastroenterology)    Social History     Tobacco Use   • Smoking status: Former Smoker     Packs/day: 1.00     Years: 5.00     Pack years: 5.00     Types: Cigarettes     Start date: 1968     Quit date: 1973     Years since quittin.0   • Smokeless tobacco: Never Used   • Tobacco comment: continue to avoid   Vaping Use   • Vaping Use: Never used   Substance Use Topics   • Alcohol use: No     Alcohol/week: 0.0 oz   • Drug use: No     Family History   Problem Relation Age of Onset   • Stroke Mother    • Heart Attack Father      She  has a past medical history of Anesthesia, Arthritis (2019), ASTHMA, Back pain, Bile leak, Bronchitis, Cancer (HCC) (), Chickenpox, Cold (01/10/2019), Diabetes (), Hypertension, Infectious disease (2019), Influenza, Kidney stone, Nasal drainage, Nephrolithiasis (), Obesity, Pain, Pneumonia, Restless leg syndrome, Snoring, and Tonsillitis. She also has no past medical history of CAD (coronary artery disease), COPD, or Liver disease.   Past Surgical History:   Procedure Laterality Date   • LESION EXCISION GENERAL Right 2019    Procedure: LESION EXCISION GENERAL - SQUAMOUS CELL CARCINOMA NOSE;  Surgeon: Martinez Martin M.D.;  Location: [facility];  Service: Plastics   • FLAP CLOSURE Right 2019    Procedure: FLAP CLOSURE - not done;  Surgeon: Martinez Martin M.D.;  Location: [facility];  Service: Plastics   • BREAST IMPLANT REMOVAL Bilateral 1/15/2016  "   Procedure: BREAST IMPLANT EXCHANGE;  Surgeon: Martinez Martin M.D.;  Location: [facility];  Service:    • CAPSULECTOMY Bilateral 1/15/2016    Procedure: CAPSULECTOMY AND CAPSULORRHAPHIES;  Surgeon: Martinez Martin M.D.;  Location: [facility];  Service:    • MASTOPEXY Bilateral 1/15/2016    Procedure: MASTOPEXY BENELLI;  Surgeon: Martinez Martin M.D.;  Location: [facility];  Service:    • ERCP  10/8/2014    Performed by Martinez Siegel M.D. at SURGERY [facility]   • ERCP IN OR  8/12/2014    Performed by Martinez Siegel M.D. at SURGERY [facility]   • DEEPALI BY LAPAROSCOPY  8/4/2014    Performed by Los Salas M.D. at SURGERY [facility]   • SD BREAST AUGMENTATION WITH IMPLANT  1996   • CHOLECYSTECTOMY     • TONSILLECTOMY             Exam:     /76 (BP Location: Left arm, Patient Position: Sitting, BP Cuff Size: Large adult)   Pulse (!) 105   Temp 36.9 °C (98.4 °F) (Temporal)   Ht 1.626 m (5' 4\")   Wt 88.5 kg (195 lb)   SpO2 96%  Body mass index is 33.47 kg/m².    Hearing excellent.    Dentition good  Alert, oriented in no acute distress.  Eye contact is good, speech goal directed, affect calm  Ears: Clear canals, normal TM's  Heart: RRR, no MRG    Assessment and Plan. The following treatment and monitoring plan is recommended:      Bella was seen today for annual wellness visit.    Diagnoses and all orders for this visit:    Medicare annual wellness visit, subsequent    High cholesterol  Chronic issue noted to be quite significantly elevated at her last check which was a couple of years ago.  Recommend repeat at this time.  Not currently on statin.  -     Lipid Profile; Future    Prediabetes  Noted on previous lab work.  A1c 6.2 in 2019.  Will recheck at this time.  -     HEMOGLOBIN A1C; Future    Benign essential HTN  Chronic issue, well controlled on lisinopril 15 mg daily.  As patient is losing weight and has made lifestyle changes, do " recommend she continue to monitor her blood pressure.  With continued weight loss, may be able to taper down on her dosage of lisinopril.  Due for metabolic panel.  -     Comp Metabolic Panel; Future    History of skin cancer  Not currently following with dermatology for regular skin checks, referral placed.  -     Referral to Dermatology    Mild intermittent asthma without complication  Patient reports asthma is doing fairly well and she uses albuterol as needed.    Obesity (BMI 30-39.9)  Other fatigue  As above, patient currently on ketogenic diet to lose weight.  She feels that she has quite a lot of difficulty losing weight efficiently.  She also endorses some fatigue.  We will check thyroid labs as below.  -     TSH; Future  -     FREE THYROXINE; Future    Osteopenia, unspecified location  Up-to-date on DEXA scan.  Recommend regular weightbearing exercise, vitamin D supplementation which she is already on, and getting at least 1200 mg of calcium per day, either from dietary sources or supplementation.    Encounter for preventive care  -     Comp Metabolic Panel; Future  -     CBC WITH DIFFERENTIAL; Future  -     Lipid Profile; Future  -     HEMOGLOBIN A1C; Future    Encounter for screening mammogram for breast cancer  -     MA-SCREENING MAMMO BILAT W/ IMPLANTS W/CAD; Future        Services suggested: No services needed at this time  Health Care Screening recommendations as per orders if indicated.  Referrals offered: PT/OT/Nutrition counseling/Behavioral Health/Smoking cessation as per orders if indicated.    Discussion today about general wellness and lifestyle habits:    · Prevent falls and reduce trip hazards; Cautioned about securing or removing rugs.  · Have a working fire alarm and carbon monoxide detector;   · Engage in regular physical activity and social activities       Follow-up: Follow-up in 6 to 12 months for routine care or sooner if needed.

## 2021-12-29 NOTE — PROGRESS NOTES
CC:  ***    HISTORY OF THE PRESENT ILLNESS: Patient is a 71 y.o. female.     ***    Allergies: Codeine, Eggs, Latex, Pcn [penicillins], and Tape    Current Outpatient Medications Ordered in Epic   Medication Sig Dispense Refill   • lisinopril (PRINIVIL) 10 MG Tab Take 1 Tablet by mouth every day. Take in combination with 5 mg dose for total 15 mg. 90 Tablet 1   • lisinopril (PRINIVIL) 5 MG Tab Take 1 Tablet by mouth every day. 90 Tablet 1   • albuterol 108 (90 Base) MCG/ACT Aero Soln inhalation aerosol Inhale 2 Puffs by mouth every 6 hours as needed for Shortness of Breath. 8.5 g 0   • GARLIC PO Take 4 Tabs by mouth 2 Times a Day.       No current [facility]-ordered facility-administered medications on file.       Past Medical History:   Diagnosis Date   • Anesthesia     sensitive to medications   • Arthritis 02/2019    R thumb   • ASTHMA     does not take inhaler( cold air and cigaretts)   • Back pain    • Bile leak    • Bronchitis     hx [id number], 2010, 2019   • Cancer ([facility]) 2018    skin   • Chickenpox    • Cold 01/10/2019   • Diabetes ([facility])     diet   • Hypertension    • Infectious disease 02/01/2019    flu   • Influenza    • Kidney stone    • Nasal drainage    • Nephrolithiasis 2008   • Obesity    • Pain     bilat. shoulders   • Pneumonia    • Restless leg syndrome    • Snoring    • Tonsillitis        Past Surgical History:   Procedure Laterality Date   • LESION EXCISION GENERAL Right 2/5/2019    Procedure: LESION EXCISION GENERAL - SQUAMOUS CELL CARCINOMA NOSE;  Surgeon: Martinez Martin M.D.;  Location: [facility];  Service: Plastics   • FLAP CLOSURE Right 2/5/2019    Procedure: FLAP CLOSURE - not done;  Surgeon: Martinez Martin M.D.;  Location: [facility];  Service: Plastics   • BREAST IMPLANT REMOVAL Bilateral 1/15/2016    Procedure: BREAST IMPLANT EXCHANGE;  Surgeon: Martinez Martin M.D.;  Location: [facility];  Service:    • CAPSULECTOMY Bilateral 1/15/2016     Procedure: CAPSULECTOMY AND CAPSULORRHAPHIES;  Surgeon: Martinez Martin M.D.;  Location: SURGERY [facility];  Service:    • MASTOPEXY Bilateral 1/15/2016    Procedure: MASTOPEXY BENELLI;  Surgeon: Martinez Martin M.D.;  Location: [facility];  Service:    • ERCP  10/8/2014    Performed by Martinez Siegel M.D. at SURGERY [facility]   • ERCP IN OR  2014    Performed by Martinez Siegel M.D. at SURGERY [facility]   • DEEPALI BY LAPAROSCOPY  2014    Performed by [place] Salas M.D. at SURGERY [facility]   • AK BREAST AUGMENTATION WITH IMPLANT     • CHOLECYSTECTOMY     • TONSILLECTOMY         Social History     Tobacco Use   • Smoking status: Former Smoker     Packs/day: 1.00     Years: 5.00     Pack years: 5.00     Types: Cigarettes     Start date: 1968     Quit date: 1973     Years since quittin.0   • Smokeless tobacco: Never Used   • Tobacco comment: continue to avoid   Vaping Use   • Vaping Use: Never used   Substance Use Topics   • Alcohol use: No     Alcohol/week: 0.0 oz   • Drug use: No       Social History     Social History Narrative   • Not on file       Family History   Problem Relation Age of Onset   • Stroke Mother    • Heart Attack Father        ROS:   Denies fever, chest pain, shortness of breath      Labs: ***  Imaging: ***    Exam: There were no vitals taken for this visit. There is no height or weight on file to calculate BMI.    General: Well appearing, NAD  HEENT: Normocephalic. Conjunctiva clear, lids without ptosis, pupils equal and reactive to light accommodation, ears normal shape and contour, canals are clear bilaterally, tympanic membranes without bulging or erythema and normal cone of light, oropharynx is without erythema, edema or exudates.   Neck: Supple without JVD. No thyromegaly or nodules  Pulmonary: Clear to ausculation.  Normal effort. No rales, ronchi, or wheezing.  Cardiovascular: Regular rate and rhythm without murmur,  rubs or gallop.   Abdomen: Soft, nontender, nondistended. Normal bowel sounds. Liver and spleen are not palpable. No rebound or guarding  Neurologic: normal gait  Lymph: No cervical, supraclavicular or axillary lymph nodes are palpable  Skin: Warm and dry.  No obvious lesions.  Musculoskeletal:  No extremity cyanosis, clubbing, or edema.  Psych: Normal mood and affect. Alert and oriented. Judgment and insight is normal.    Please note that this dictation was created using voice recognition software. I have made every reasonable attempt to correct obvious errors, but I expect that there are errors of grammar and possibly content that I did not discover before finalizing the note.      Assessment/Plan  There are no diagnoses linked to this encounter.      Gracy Quijano,   [facility]

## 2022-04-26 ENCOUNTER — OFFICE VISIT (OUTPATIENT)
Dept: URGENT CARE | Facility: PHYSICIAN GROUP | Age: 72
End: 2022-04-26
Payer: MEDICARE

## 2022-04-26 ENCOUNTER — HOSPITAL ENCOUNTER (OUTPATIENT)
Facility: MEDICAL CENTER | Age: 72
End: 2022-04-26
Attending: FAMILY MEDICINE
Payer: MEDICARE

## 2022-04-26 VITALS
HEART RATE: 92 BPM | BODY MASS INDEX: 30.22 KG/M2 | HEIGHT: 64 IN | OXYGEN SATURATION: 95 % | WEIGHT: 177 LBS | RESPIRATION RATE: 18 BRPM | SYSTOLIC BLOOD PRESSURE: 114 MMHG | DIASTOLIC BLOOD PRESSURE: 72 MMHG | TEMPERATURE: 98.1 F

## 2022-04-26 DIAGNOSIS — Z03.818 ENCOUNTER FOR PATIENT CONCERN ABOUT EXPOSURE TO INFECTIOUS ORGANISM: ICD-10-CM

## 2022-04-26 LAB
COVID ORDER STATUS COVID19: NORMAL
EXTERNAL QUALITY CONTROL: NORMAL
FLUAV+FLUBV AG SPEC QL IA: NEGATIVE
INT CON NEG: NORMAL
INT CON POS: NORMAL
SARS-COV+SARS-COV-2 AG RESP QL IA.RAPID: NEGATIVE
SARS-COV-2 RNA RESP QL NAA+PROBE: NOTDETECTED
SPECIMEN SOURCE: NORMAL

## 2022-04-26 PROCEDURE — U0005 INFEC AGEN DETEC AMPLI PROBE: HCPCS

## 2022-04-26 PROCEDURE — U0003 INFECTIOUS AGENT DETECTION BY NUCLEIC ACID (DNA OR RNA); SEVERE ACUTE RESPIRATORY SYNDROME CORONAVIRUS 2 (SARS-COV-2) (CORONAVIRUS DISEASE [COVID-19]), AMPLIFIED PROBE TECHNIQUE, MAKING USE OF HIGH THROUGHPUT TECHNOLOGIES AS DESCRIBED BY CMS-2020-01-R: HCPCS

## 2022-04-26 PROCEDURE — 87426 SARSCOV CORONAVIRUS AG IA: CPT | Performed by: FAMILY MEDICINE

## 2022-04-26 PROCEDURE — 87804 INFLUENZA ASSAY W/OPTIC: CPT | Performed by: FAMILY MEDICINE

## 2022-04-26 PROCEDURE — 99213 OFFICE O/P EST LOW 20 MIN: CPT | Mod: CS | Performed by: FAMILY MEDICINE

## 2022-04-26 NOTE — PROGRESS NOTES
"  Subjective:      72 y.o. female presents to urgent care for cold symptoms that started this morning.  She is experiencing runny nose, sore throat, cough, diarrhea, headache, subjective fevers, and body aches. She hasn't tried anything yet for her symptoms. She denies any tobacco product use.  She has asthma for which she uses Albuterol as needed.  She is fully vaccinated against COVID.  She has had no known sick contacts.    She denies any other questions or concerns at this time.    Current problem list, medication, and past medical/surgical history were reviewed in Epic.    ROS  See HPI     Objective:      /72 (BP Location: Left arm, Patient Position: Sitting, BP Cuff Size: Adult)   Pulse 92   Temp 36.7 °C (98.1 °F) (Temporal)   Resp 18   Ht 1.626 m (5' 4\")   Wt 80.3 kg (177 lb)   SpO2 95%   BMI 30.38 kg/m²     Physical Exam  Constitutional:       General: She is not in acute distress.     Appearance: She is not diaphoretic.   HENT:      Right Ear: Tympanic membrane, ear canal and external ear normal.      Left Ear: Tympanic membrane, ear canal and external ear normal.      Nose:      Right Sinus: No maxillary sinus tenderness or frontal sinus tenderness.      Left Sinus: No maxillary sinus tenderness or frontal sinus tenderness.      Mouth/Throat:      Palate: No lesions.      Pharynx: Uvula midline. No posterior oropharyngeal erythema.      Tonsils: No tonsillar exudate.   Cardiovascular:      Rate and Rhythm: Normal rate and regular rhythm.      Heart sounds: Normal heart sounds.   Pulmonary:      Effort: Pulmonary effort is normal. No respiratory distress.      Breath sounds: Normal breath sounds.   Neurological:      Mental Status: She is alert.   Psychiatric:         Mood and Affect: Affect normal.         Judgment: Judgment normal.       Assessment/Plan:     1. Encounter for patient concern about exposure to infectious organism  Influenza negative.  Rapid COVID-negative.  PCR COVID has been " sent. In the meantime patient was advised to isolate until COVID test results returned. I encouraged mask use, frequent handwashing, wiping down hard surfaces, etc. Tylenol and Ibuprofen were recommended for symptomatic relief. If positive they will be contacted by their local health department regarding return to work/school protocols. Patient is currently without indication of need for higher level of care. Patient/Guardian was given precautionary signs/symptoms that mandate immediate follow up and evaluation in the ED. The patient and/or guardian demonstrated a good understanding and agreed with the treatment plan.  - POCT Influenza A/B  - POCT SARS-COV Antigen TOÑA Manual Result  - SARS-CoV-2 PCR (24 hour In-House): Collect NP swab in VTM; Future  - Referral to establish with [facility] PCP      Instructed to return to Urgent Care or nearest Emergency Department if symptoms fail to improve, for any change in condition, further concerns, or new concerning symptoms. Patient states understanding of the plan of care and discharge instructions.    Chioma Sequeira M.D.

## 2022-07-11 ENCOUNTER — OFFICE VISIT (OUTPATIENT)
Dept: URGENT CARE | Facility: PHYSICIAN GROUP | Age: 72
End: 2022-07-11
Payer: MEDICARE

## 2022-07-11 VITALS
SYSTOLIC BLOOD PRESSURE: 122 MMHG | TEMPERATURE: 98 F | WEIGHT: 171 LBS | OXYGEN SATURATION: 96 % | RESPIRATION RATE: 20 BRPM | DIASTOLIC BLOOD PRESSURE: 74 MMHG | HEIGHT: 64 IN | BODY MASS INDEX: 29.19 KG/M2 | HEART RATE: 94 BPM

## 2022-07-11 DIAGNOSIS — Z76.0 MEDICATION REFILL: ICD-10-CM

## 2022-07-11 DIAGNOSIS — I10 BENIGN ESSENTIAL HTN: ICD-10-CM

## 2022-07-11 PROCEDURE — 99213 OFFICE O/P EST LOW 20 MIN: CPT | Performed by: NURSE PRACTITIONER

## 2022-07-11 RX ORDER — LISINOPRIL 5 MG/1
5 TABLET ORAL DAILY
Qty: 90 TABLET | Refills: 0 | Status: SHIPPED | OUTPATIENT
Start: 2022-07-11 | End: 2022-08-09

## 2022-07-11 RX ORDER — LISINOPRIL 10 MG/1
10 TABLET ORAL DAILY
Qty: 90 TABLET | Refills: 0 | Status: SHIPPED
Start: 2022-07-11 | End: 2022-08-09

## 2022-07-11 ASSESSMENT — VISUAL ACUITY: OU: 1

## 2022-07-11 NOTE — PROGRESS NOTES
Subjective:     Bella Navarro is a 72 y.o. female who presents for Medication Refill (Lisinopril)       Other  This is a new problem.     Last saw PCP on 12/21/2021.  History of hypertension.  Noted to be controlled on lisinopril 15 mg once a day.    Patient running out of her medication soon.  Needs refill.  PCP has left.  Has appointment with new PCP for next month.    Review of Systems   All other systems reviewed and are negative.    Refer to [facility] for additional details.    During this visit, appropriate PPE was worn, hand hygiene was performed, and the patient and any visitors were masked.    PMH:  has a past medical history of Anesthesia, Arthritis (02/2019), ASTHMA, Back pain, Bile leak, Bronchitis, Cancer (formerly [facility]) (2018), Chickenpox, Cold (01/10/2019), Diabetes (HCC), Hypertension, Infectious disease (02/01/2019), Influenza, Kidney stone, Nasal drainage, Nephrolithiasis (2008), Obesity, Pain, Pneumonia, Restless leg syndrome, Snoring, and Tonsillitis.    She has no past medical history of CAD (coronary artery disease), COPD, or Liver disease.    MEDS:   Current Outpatient Medications:   •  lisinopril (PRINIVIL) 10 MG Tab, Take 1 Tablet by mouth every day. Take with 5 mg tablet for total dose of 15 mg once a day., Disp: 90 Tablet, Rfl: 0  •  lisinopril (PRINIVIL) 5 MG Tab, Take 1 Tablet by mouth every day. Take with 10 mg tablet for total dose of 15 mg once a day., Disp: 90 Tablet, Rfl: 0  •  lisinopril (PRINIVIL) 10 MG Tab, Take 1 Tablet by mouth every day. Take in combination with 5 mg dose for total 15 mg., Disp: 90 Tablet, Rfl: 1  •  lisinopril (PRINIVIL) 5 MG Tab, Take 1 Tablet by mouth every day., Disp: 90 Tablet, Rfl: 1  •  albuterol 108 (90 Base) MCG/ACT Aero Soln inhalation aerosol, Inhale 2 Puffs by mouth every 6 hours as needed for Shortness of Breath., Disp: 8.5 g, Rfl: 0    ALLERGIES:   Allergies   Allergen Reactions   • Codeine Anaphylaxis   • Codeine    • Eggs Diarrhea and Vomiting     GI  "distress.   • Latex Rash     .Latex tape mostly   • Pcn [Penicillins] Shortness of Breath   • Tape      SURGHX:   Past Surgical History:   Procedure Laterality Date   • LESION EXCISION GENERAL Right 2/5/2019    Procedure: LESION EXCISION GENERAL - SQUAMOUS CELL CARCINOMA NOSE;  Surgeon: Martinez Martin M.D.;  Location: [facility];  Service: Plastics   • FLAP CLOSURE Right 2/5/2019    Procedure: FLAP CLOSURE - not done;  Surgeon: Martinez Martin M.D.;  Location: [facility];  Service: Plastics   • BREAST IMPLANT REMOVAL Bilateral 1/15/2016    Procedure: BREAST IMPLANT EXCHANGE;  Surgeon: Martinez Martin M.D.;  Location: [facility];  Service:    • CAPSULECTOMY Bilateral 1/15/2016    Procedure: CAPSULECTOMY AND CAPSULORRHAPHIES;  Surgeon: Martinez Martin M.D.;  Location: [facility];  Service:    • MASTOPEXY Bilateral 1/15/2016    Procedure: MASTOPEXY BENELLI;  Surgeon: Martinez Martin M.D.;  Location: [facility];  Service:    • ERCP  10/8/2014    Performed by Martinez Siegel M.D. at SURGERY [facility]   • ERCP IN OR  8/12/2014    Performed by Martinez Siegel M.D. at SURGERY [facility]   • DEEPALI BY LAPAROSCOPY  8/4/2014    Performed by [place] Salas M.D. at SURGERY [facility]   • GA BREAST AUGMENTATION WITH IMPLANT  1996   • CHOLECYSTECTOMY     • TONSILLECTOMY       SOCHX:  reports that she quit smoking about 49 years ago. Her smoking use included cigarettes. She started smoking about 54 years ago. She has a 5.00 pack-year smoking history. She has never used smokeless tobacco. She reports that she does not drink alcohol and does not use drugs.    FH: Per HPI as applicable/pertinent.      Objective:     /74 (BP Location: Left arm, Patient Position: Sitting, BP Cuff Size: Adult)   Pulse 94   Temp 36.7 °C (98 °F) (Temporal)   Resp 20   Ht 1.626 m (5' 4\")   Wt 77.6 kg (171 lb)   SpO2 96%   BMI 29.35 kg/m² "     Physical Exam  Nursing note reviewed.   Constitutional:       General: She is not in acute distress.     Appearance: She is well-developed. She is not ill-appearing or toxic-appearing.   Eyes:      General: Vision grossly intact.   Cardiovascular:      Rate and Rhythm: Normal rate.   Pulmonary:      Effort: Pulmonary effort is normal. No respiratory distress.   Musculoskeletal:         General: No deformity. Normal range of motion.   Skin:     Coloration: Skin is not pale.   Neurological:      Mental Status: She is alert and oriented to person, place, and time.      Motor: No weakness.   Psychiatric:         Behavior: Behavior normal. Behavior is cooperative.       Assessment/Plan:     1. Benign essential HTN  - lisinopril (PRINIVIL) 10 MG Tab; Take 1 Tablet by mouth every day. Take with 5 mg tablet for total dose of 15 mg once a day.  Dispense: 90 Tablet; Refill: 0  - lisinopril (PRINIVIL) 5 MG Tab; Take 1 Tablet by mouth every day. Take with 10 mg tablet for total dose of 15 mg once a day.  Dispense: 90 Tablet; Refill: 0    2. Medication refill  - lisinopril (PRINIVIL) 10 MG Tab; Take 1 Tablet by mouth every day. Take with 5 mg tablet for total dose of 15 mg once a day.  Dispense: 90 Tablet; Refill: 0  - lisinopril (PRINIVIL) 5 MG Tab; Take 1 Tablet by mouth every day. Take with 10 mg tablet for total dose of 15 mg once a day.  Dispense: 90 Tablet; Refill: 0    Bridge Rx as above sent electronically. Patient will establish with new PCP next month.    Differential diagnosis, natural history, supportive care, over-the-counter symptom management per 's instructions, close monitoring, and indications for immediate follow-up discussed.     All questions answered. Patient agrees with the plan of care.    Discharge summary provided.

## 2022-08-08 SDOH — ECONOMIC STABILITY: INCOME INSECURITY: HOW HARD IS IT FOR YOU TO PAY FOR THE VERY BASICS LIKE FOOD, HOUSING, MEDICAL CARE, AND HEATING?: NOT VERY HARD

## 2022-08-08 SDOH — ECONOMIC STABILITY: INCOME INSECURITY: IN THE LAST 12 MONTHS, WAS THERE A TIME WHEN YOU WERE NOT ABLE TO PAY THE MORTGAGE OR RENT ON TIME?: NO

## 2022-08-08 SDOH — ECONOMIC STABILITY: HOUSING INSECURITY
IN THE LAST 12 MONTHS, WAS THERE A TIME WHEN YOU DID NOT HAVE A STEADY PLACE TO SLEEP OR SLEPT IN A SHELTER (INCLUDING NOW)?: NO

## 2022-08-08 SDOH — ECONOMIC STABILITY: HOUSING INSECURITY: IN THE LAST 12 MONTHS, HOW MANY PLACES HAVE YOU LIVED?: 1

## 2022-08-08 SDOH — ECONOMIC STABILITY: TRANSPORTATION INSECURITY
IN THE PAST 12 MONTHS, HAS LACK OF TRANSPORTATION KEPT YOU FROM MEETINGS, WORK, OR FROM GETTING THINGS NEEDED FOR DAILY LIVING?: NO

## 2022-08-08 SDOH — ECONOMIC STABILITY: TRANSPORTATION INSECURITY
IN THE PAST 12 MONTHS, HAS LACK OF RELIABLE TRANSPORTATION KEPT YOU FROM MEDICAL APPOINTMENTS, MEETINGS, WORK OR FROM GETTING THINGS NEEDED FOR DAILY LIVING?: NO

## 2022-08-08 SDOH — ECONOMIC STABILITY: FOOD INSECURITY: WITHIN THE PAST 12 MONTHS, YOU WORRIED THAT YOUR FOOD WOULD RUN OUT BEFORE YOU GOT MONEY TO BUY MORE.: NEVER TRUE

## 2022-08-08 SDOH — HEALTH STABILITY: MENTAL HEALTH
STRESS IS WHEN SOMEONE FEELS TENSE, NERVOUS, ANXIOUS, OR CAN'T SLEEP AT NIGHT BECAUSE THEIR MIND IS TROUBLED. HOW STRESSED ARE YOU?: TO SOME EXTENT

## 2022-08-08 SDOH — HEALTH STABILITY: PHYSICAL HEALTH: ON AVERAGE, HOW MANY DAYS PER WEEK DO YOU ENGAGE IN MODERATE TO STRENUOUS EXERCISE (LIKE A BRISK WALK)?: 3 DAYS

## 2022-08-08 SDOH — ECONOMIC STABILITY: TRANSPORTATION INSECURITY
IN THE PAST 12 MONTHS, HAS THE LACK OF TRANSPORTATION KEPT YOU FROM MEDICAL APPOINTMENTS OR FROM GETTING MEDICATIONS?: NO

## 2022-08-08 SDOH — ECONOMIC STABILITY: FOOD INSECURITY: WITHIN THE PAST 12 MONTHS, THE FOOD YOU BOUGHT JUST DIDN'T LAST AND YOU DIDN'T HAVE MONEY TO GET MORE.: NEVER TRUE

## 2022-08-08 SDOH — HEALTH STABILITY: PHYSICAL HEALTH: ON AVERAGE, HOW MANY MINUTES DO YOU ENGAGE IN EXERCISE AT THIS LEVEL?: 50 MIN

## 2022-08-08 ASSESSMENT — SOCIAL DETERMINANTS OF HEALTH (SDOH)
DO YOU BELONG TO ANY CLUBS OR ORGANIZATIONS SUCH AS CHURCH GROUPS UNIONS, FRATERNAL OR ATHLETIC GROUPS, OR SCHOOL GROUPS?: YES
HOW MANY DRINKS CONTAINING ALCOHOL DO YOU HAVE ON A TYPICAL DAY WHEN YOU ARE DRINKING: PATIENT DOES NOT DRINK
HOW OFTEN DO YOU GET TOGETHER WITH FRIENDS OR RELATIVES?: MORE THAN THREE TIMES A WEEK
HOW OFTEN DO YOU HAVE SIX OR MORE DRINKS ON ONE OCCASION: NEVER
HOW OFTEN DO YOU ATTENT MEETINGS OF THE CLUB OR ORGANIZATION YOU BELONG TO?: 1 TO 4 TIMES PER YEAR
IN A TYPICAL WEEK, HOW MANY TIMES DO YOU TALK ON THE PHONE WITH FAMILY, FRIENDS, OR NEIGHBORS?: MORE THAN THREE TIMES A WEEK
HOW OFTEN DO YOU ATTEND CHURCH OR RELIGIOUS SERVICES?: NEVER
HOW HARD IS IT FOR YOU TO PAY FOR THE VERY BASICS LIKE FOOD, HOUSING, MEDICAL CARE, AND HEATING?: NOT VERY HARD
WITHIN THE PAST 12 MONTHS, YOU WORRIED THAT YOUR FOOD WOULD RUN OUT BEFORE YOU GOT THE MONEY TO BUY MORE: NEVER TRUE
DO YOU BELONG TO ANY CLUBS OR ORGANIZATIONS SUCH AS CHURCH GROUPS UNIONS, FRATERNAL OR ATHLETIC GROUPS, OR SCHOOL GROUPS?: YES
HOW OFTEN DO YOU ATTENT MEETINGS OF THE CLUB OR ORGANIZATION YOU BELONG TO?: 1 TO 4 TIMES PER YEAR
IN A TYPICAL WEEK, HOW MANY TIMES DO YOU TALK ON THE PHONE WITH FAMILY, FRIENDS, OR NEIGHBORS?: MORE THAN THREE TIMES A WEEK
HOW OFTEN DO YOU GET TOGETHER WITH FRIENDS OR RELATIVES?: MORE THAN THREE TIMES A WEEK
HOW OFTEN DO YOU HAVE A DRINK CONTAINING ALCOHOL: NEVER
HOW OFTEN DO YOU ATTEND CHURCH OR RELIGIOUS SERVICES?: NEVER

## 2022-08-08 ASSESSMENT — LIFESTYLE VARIABLES
HOW MANY STANDARD DRINKS CONTAINING ALCOHOL DO YOU HAVE ON A TYPICAL DAY: PATIENT DOES NOT DRINK
AUDIT-C TOTAL SCORE: 0
HOW OFTEN DO YOU HAVE A DRINK CONTAINING ALCOHOL: NEVER
HOW OFTEN DO YOU HAVE SIX OR MORE DRINKS ON ONE OCCASION: NEVER
SKIP TO QUESTIONS 9-10: 1

## 2022-08-09 ENCOUNTER — OFFICE VISIT (OUTPATIENT)
Dept: MEDICAL GROUP | Facility: PHYSICIAN GROUP | Age: 72
End: 2022-08-09
Payer: MEDICARE

## 2022-08-09 VITALS
DIASTOLIC BLOOD PRESSURE: 66 MMHG | HEIGHT: 64 IN | SYSTOLIC BLOOD PRESSURE: 100 MMHG | TEMPERATURE: 98 F | BODY MASS INDEX: 28.99 KG/M2 | WEIGHT: 169.8 LBS | HEART RATE: 75 BPM | OXYGEN SATURATION: 99 %

## 2022-08-09 DIAGNOSIS — I10 BENIGN ESSENTIAL HTN: ICD-10-CM

## 2022-08-09 DIAGNOSIS — L85.3 DRY SKIN: ICD-10-CM

## 2022-08-09 DIAGNOSIS — E78.00 HIGH CHOLESTEROL: ICD-10-CM

## 2022-08-09 DIAGNOSIS — J45.20 MILD INTERMITTENT ASTHMA WITHOUT COMPLICATION: ICD-10-CM

## 2022-08-09 DIAGNOSIS — Z12.31 ENCOUNTER FOR SCREENING MAMMOGRAM FOR BREAST CANCER: ICD-10-CM

## 2022-08-09 DIAGNOSIS — R73.03 PREDIABETES: ICD-10-CM

## 2022-08-09 DIAGNOSIS — Z11.3 ENCOUNTER FOR SPECIAL SCREENING EXAMINATION FOR INFECTION WITH PREDOMINANTLY SEXUAL MODE OF TRANSMISSION: Primary | ICD-10-CM

## 2022-08-09 DIAGNOSIS — E66.9 OBESITY (BMI 30-39.9): ICD-10-CM

## 2022-08-09 DIAGNOSIS — Z00.00 ENCOUNTER FOR MEDICAL EXAMINATION TO ESTABLISH CARE: ICD-10-CM

## 2022-08-09 PROCEDURE — 99214 OFFICE O/P EST MOD 30 MIN: CPT

## 2022-08-09 ASSESSMENT — PATIENT HEALTH QUESTIONNAIRE - PHQ9: CLINICAL INTERPRETATION OF PHQ2 SCORE: 0

## 2022-08-09 NOTE — PROGRESS NOTES
"Subjective:     CC:    Chief Complaint   Patient presents with   • Establish Care   • Medication Refill       HISTORY OF THE PRESENT ILLNESS: Bella is a pleasant 72 y.o. female here today to establish care.  Overall, patient is healthy with no concerns other than getting off of her blood pressure medication.  Patient states she does not typically like to be on any medications.  She is very passionate about her diet and lifestyle.  She only buys organic foods, she exercises daily and takes measures to live a healthy lifestyle.  His prior PCP was Dr. Gracy Quijano.    Problem   Obesity (Bmi 30-39.9)    Patient has been very proactive trying to lose weight.  For the last year, she has been eating a low-carb, low-fat diet.  Also does intermittent fasting and typically eats between noon-6pm.  She does yoga and walks daily.  She is trying to get off her BP medications and lose weight.  So far, she has lost about 40 pounds.      Benign Essential Htn    Patient was diagnosed with high blood pressure a few years ago and started on lisinopril 10 mg.  Patient states she does not want to be on medication and has been working hard at lifestyle modifications to get off of it.  She used to monitor her blood pressure at home but admits she has not done it lately.  She has been exercising, eating healthy trying to lose weight, drinking green tea and other home remedies to get off her medication.       Health Maintenance: Completed    ROS:   All systems negative except as addressed in assessment and plan.       Objective:     Exam: /66 (BP Location: Left arm, Patient Position: Sitting, BP Cuff Size: Adult)   Pulse 75   Temp 36.7 °C (98 °F) (Temporal)   Ht 1.626 m (5' 4\")   Wt 77 kg (169 lb 12.8 oz)   SpO2 99%  Body mass index is 29.15 kg/m².    Physical Exam  Vitals reviewed.   Constitutional:       Appearance: Normal appearance.   Cardiovascular:      Rate and Rhythm: Normal rate.   Pulmonary:      Effort: Pulmonary " effort is normal.   Musculoskeletal:         General: Normal range of motion.   Skin:     General: Skin is warm and dry.   Neurological:      Mental Status: Mental status is at baseline.   Psychiatric:         Mood and Affect: Mood normal.         Behavior: Behavior normal.       Labs: Reviewed from 02/01/2019    Assessment & Plan:   72 y.o. female with the following -    1. Encounter for medical examination to establish care  Health conditions and medications reviewed and updated. All screenings discussed and up-to-date. Health maintenance completed. Patient is due for several vaccines but she is not interested in receiving these today.  - CBC WITH DIFFERENTIAL; Future  - VITAMIN D,25 HYDROXY; Future  - TSH WITH REFLEX TO FT4; Future    2. Benign essential HTN  Chronic, stable  Patient's blood pressure was 100/66.  A repeat reading by me was 98/68.  I agreed with the patient that she has made good progress with her health and managing her blood pressure and it would be reasonable to discontinue it.  I talked with her about trialing a month off of it.  She will return in 1 month for a follow-up blood pressure in clinic.    3. Obesity (BMI 30-39.9)  - Comp Metabolic Panel; Future  - TSH WITH REFLEX TO FT4; Future  - THYROID PEROXIDASE  (TPO) AB; Future    4. Prediabetes  Last set of labs were done 02/01/2019  A1c 6.2  Will screen for diabetes with annual labs   - Lipid Profile; Future  - HEMOGLOBIN A1C; Future    5. High cholesterol  Last labs done in 2017. Will review lipid panel with annual labs.   Latest Reference Range & Units 06/23/17 09:28   Cholesterol,Tot 100 - 199 mg/dL 267 (H)   Triglycerides 0 - 149 mg/dL 145   HDL >=40 mg/dL 55   LDL <100 mg/dL 183 (H)     - Lipid Profile; Future    6. Mild intermittent asthma without complication  Chronic, stable.  Managed on albuterol.  She doesn't use it too often and can't recall the last time she needed it.   She tries to regulate her exercise.    7. Encounter for  special screening examination for infection with predominantly sexual mode of transmission  - HEP C VIRUS ANTIBODY; Future    8. Dry skin  - THYROID PEROXIDASE  (TPO) AB; Future    9. Encounter for screening mammogram for breast cancer  - MA-SCREENING MAMMO BILAT W/TOMOSYNTHESIS W/CAD; Future    Patient was educated in proper administration of medication(s) ordered today including safety, possible SE, risks, benefits, rationale and alternatives to therapy.   Supportive care, differential diagnoses, and indications for immediate follow-up discussed with patient.    Pathogenesis of diagnosis discussed including typical length and natural progression.    Instructed to return to clinic or nearest emergency department for any change in condition, further concerns, or worsening of symptoms.  Patient states understanding of the plan of care and discharge instructions.    Return in about 1 year (around 8/9/2023) for Wellness Visit.    I spent a total of 31 minutes with record review, exam, and communication with the patient, communication with other providers, and documentation of this encounter. This does not include time spent on separately billable procedures/tests.    I have placed the above orders and discussed them with an approved delegating provider.  The MA is performing the below orders under the direction of Dr. Lozada.    Please note that this dictation was created using voice recognition software. I have worked with consultants from the vendor as well as technical experts from [employer] to optimize the interface. I have made every reasonable attempt to correct obvious errors, but I expect that there are errors of grammar and possibly content that I did not discover before finalizing the note.

## 2022-08-09 NOTE — ASSESSMENT & PLAN NOTE
Chronic, stable.  Managed on albuterol.  She doesn't use it too often and can't recall the last time she needed it.   She tries to regulate her exercise.

## 2022-08-09 NOTE — ASSESSMENT & PLAN NOTE
Chronic, stable  Patient's blood pressure was 100/66.  A repeat reading by me was 98/68.  I do not believe patient needs to continue with her medication.  I talked with her about trialing her off of it.  We will stop her medication and have her return in 1 month for a check of her blood pressure.

## 2022-08-11 ENCOUNTER — HOSPITAL ENCOUNTER (OUTPATIENT)
Dept: LAB | Facility: MEDICAL CENTER | Age: 72
End: 2022-08-11
Payer: MEDICARE

## 2022-08-11 DIAGNOSIS — L85.3 DRY SKIN: ICD-10-CM

## 2022-08-11 DIAGNOSIS — R73.03 PREDIABETES: ICD-10-CM

## 2022-08-11 DIAGNOSIS — Z11.3 ENCOUNTER FOR SPECIAL SCREENING EXAMINATION FOR INFECTION WITH PREDOMINANTLY SEXUAL MODE OF TRANSMISSION: ICD-10-CM

## 2022-08-11 DIAGNOSIS — Z00.00 ENCOUNTER FOR MEDICAL EXAMINATION TO ESTABLISH CARE: ICD-10-CM

## 2022-08-11 DIAGNOSIS — E66.9 OBESITY (BMI 30-39.9): ICD-10-CM

## 2022-08-11 DIAGNOSIS — E78.00 HIGH CHOLESTEROL: ICD-10-CM

## 2022-08-11 LAB
25(OH)D3 SERPL-MCNC: 50 NG/ML (ref 30–100)
ALBUMIN SERPL BCP-MCNC: 4.2 G/DL (ref 3.2–4.9)
ALBUMIN/GLOB SERPL: 1.8 G/DL
ALP SERPL-CCNC: 65 U/L (ref 30–99)
ALT SERPL-CCNC: 22 U/L (ref 2–50)
ANION GAP SERPL CALC-SCNC: 12 MMOL/L (ref 7–16)
AST SERPL-CCNC: 19 U/L (ref 12–45)
BASOPHILS # BLD AUTO: 0.3 % (ref 0–1.8)
BASOPHILS # BLD: 0.02 K/UL (ref 0–0.12)
BILIRUB SERPL-MCNC: 0.4 MG/DL (ref 0.1–1.5)
BUN SERPL-MCNC: 20 MG/DL (ref 8–22)
CALCIUM SERPL-MCNC: 9.8 MG/DL (ref 8.5–10.5)
CHLORIDE SERPL-SCNC: 107 MMOL/L (ref 96–112)
CHOLEST SERPL-MCNC: 220 MG/DL (ref 100–199)
CO2 SERPL-SCNC: 22 MMOL/L (ref 20–33)
CREAT SERPL-MCNC: 0.91 MG/DL (ref 0.5–1.4)
EOSINOPHIL # BLD AUTO: 0.08 K/UL (ref 0–0.51)
EOSINOPHIL NFR BLD: 1.2 % (ref 0–6.9)
ERYTHROCYTE [DISTWIDTH] IN BLOOD BY AUTOMATED COUNT: 48.9 FL (ref 35.9–50)
EST. AVERAGE GLUCOSE BLD GHB EST-MCNC: 120 MG/DL
GFR SERPLBLD CREATININE-BSD FMLA CKD-EPI: 68 ML/MIN/1.73 M 2
GLOBULIN SER CALC-MCNC: 2.4 G/DL (ref 1.9–3.5)
GLUCOSE SERPL-MCNC: 101 MG/DL (ref 65–99)
HBA1C MFR BLD: 5.8 % (ref 4–5.6)
HCT VFR BLD AUTO: 44.6 % (ref 37–47)
HCV AB SER QL: NORMAL
HDLC SERPL-MCNC: 55 MG/DL
HGB BLD-MCNC: 14.8 G/DL (ref 12–16)
IMM GRANULOCYTES # BLD AUTO: 0.03 K/UL (ref 0–0.11)
IMM GRANULOCYTES NFR BLD AUTO: 0.5 % (ref 0–0.9)
LDLC SERPL CALC-MCNC: 145 MG/DL
LYMPHOCYTES # BLD AUTO: 2.12 K/UL (ref 1–4.8)
LYMPHOCYTES NFR BLD: 32.5 % (ref 22–41)
MCH RBC QN AUTO: 31.6 PG (ref 27–33)
MCHC RBC AUTO-ENTMCNC: 33.2 G/DL (ref 33.6–35)
MCV RBC AUTO: 95.3 FL (ref 81.4–97.8)
MONOCYTES # BLD AUTO: 0.58 K/UL (ref 0–0.85)
MONOCYTES NFR BLD AUTO: 8.9 % (ref 0–13.4)
NEUTROPHILS # BLD AUTO: 3.69 K/UL (ref 2–7.15)
NEUTROPHILS NFR BLD: 56.6 % (ref 44–72)
NRBC # BLD AUTO: 0 K/UL
NRBC BLD-RTO: 0 /100 WBC
PLATELET # BLD AUTO: 262 K/UL (ref 164–446)
PMV BLD AUTO: 9.2 FL (ref 9–12.9)
POTASSIUM SERPL-SCNC: 4.7 MMOL/L (ref 3.6–5.5)
PROT SERPL-MCNC: 6.6 G/DL (ref 6–8.2)
RBC # BLD AUTO: 4.68 M/UL (ref 4.2–5.4)
SODIUM SERPL-SCNC: 141 MMOL/L (ref 135–145)
THYROPEROXIDASE AB SERPL-ACNC: 116 IU/ML (ref 0–9)
TRIGL SERPL-MCNC: 100 MG/DL (ref 0–149)
TSH SERPL DL<=0.005 MIU/L-ACNC: 1.25 UIU/ML (ref 0.38–5.33)
WBC # BLD AUTO: 6.5 K/UL (ref 4.8–10.8)

## 2022-08-11 PROCEDURE — 84443 ASSAY THYROID STIM HORMONE: CPT

## 2022-08-11 PROCEDURE — 80061 LIPID PANEL: CPT

## 2022-08-11 PROCEDURE — 83036 HEMOGLOBIN GLYCOSYLATED A1C: CPT | Mod: GA

## 2022-08-11 PROCEDURE — 85025 COMPLETE CBC W/AUTO DIFF WBC: CPT

## 2022-08-11 PROCEDURE — 86803 HEPATITIS C AB TEST: CPT

## 2022-08-11 PROCEDURE — 36415 COLL VENOUS BLD VENIPUNCTURE: CPT

## 2022-08-11 PROCEDURE — 86376 MICROSOMAL ANTIBODY EACH: CPT

## 2022-08-11 PROCEDURE — 80053 COMPREHEN METABOLIC PANEL: CPT

## 2022-08-11 PROCEDURE — 82306 VITAMIN D 25 HYDROXY: CPT

## 2022-09-12 ENCOUNTER — NON-PROVIDER VISIT (OUTPATIENT)
Dept: MEDICAL GROUP | Facility: PHYSICIAN GROUP | Age: 72
End: 2022-09-12
Payer: MEDICARE

## 2022-09-12 VITALS — DIASTOLIC BLOOD PRESSURE: 64 MMHG | SYSTOLIC BLOOD PRESSURE: 122 MMHG

## 2022-09-12 NOTE — PROGRESS NOTES
Bella Navarro is a 72 y.o. female here for a non-provider visit for bp check    Encounter Vitals  Blood Pressure : 122/64    If abnormal, was the Registered Nurse (office provider if RN is unavailable) notified today? Not Indicated    Routed to PCP/Requested Provider? Yes

## 2022-09-16 ENCOUNTER — OFFICE VISIT (OUTPATIENT)
Dept: MEDICAL GROUP | Facility: PHYSICIAN GROUP | Age: 72
End: 2022-09-16
Payer: MEDICARE

## 2022-09-16 VITALS
WEIGHT: 168.4 LBS | BODY MASS INDEX: 28.75 KG/M2 | HEART RATE: 71 BPM | OXYGEN SATURATION: 95 % | HEIGHT: 64 IN | DIASTOLIC BLOOD PRESSURE: 86 MMHG | SYSTOLIC BLOOD PRESSURE: 136 MMHG | TEMPERATURE: 98.4 F

## 2022-09-16 DIAGNOSIS — F51.09 SITUATIONAL INSOMNIA: ICD-10-CM

## 2022-09-16 DIAGNOSIS — Z23 NEED FOR VACCINATION: ICD-10-CM

## 2022-09-16 DIAGNOSIS — J45.41 MODERATE PERSISTENT ASTHMA WITH EXACERBATION: ICD-10-CM

## 2022-09-16 DIAGNOSIS — R42 VERTIGO: ICD-10-CM

## 2022-09-16 DIAGNOSIS — Z71.84 TRAVEL ADVICE ENCOUNTER: ICD-10-CM

## 2022-09-16 PROCEDURE — G0010 ADMIN HEPATITIS B VACCINE: HCPCS

## 2022-09-16 PROCEDURE — 99214 OFFICE O/P EST MOD 30 MIN: CPT | Mod: 25

## 2022-09-16 PROCEDURE — 90746 HEPB VACCINE 3 DOSE ADULT IM: CPT

## 2022-09-16 RX ORDER — ALBUTEROL SULFATE 90 UG/1
2 AEROSOL, METERED RESPIRATORY (INHALATION) EVERY 6 HOURS PRN
Qty: 8.5 G | Refills: 0 | Status: SHIPPED | OUTPATIENT
Start: 2022-09-16

## 2022-09-16 RX ORDER — AMOXICILLIN AND CLAVULANATE POTASSIUM 875; 125 MG/1; MG/1
1 TABLET, FILM COATED ORAL 2 TIMES DAILY
COMMUNITY
Start: 2022-09-06 | End: 2022-11-16

## 2022-09-16 RX ORDER — ZOLPIDEM TARTRATE 5 MG/1
5 TABLET ORAL NIGHTLY PRN
Qty: 15 TABLET | Refills: 0 | Status: SHIPPED | OUTPATIENT
Start: 2022-09-16 | End: 2022-10-16

## 2022-09-16 ASSESSMENT — FIBROSIS 4 INDEX: FIB4 SCORE: 1.08

## 2022-09-16 NOTE — PROGRESS NOTES
"Subjective:     CC:   Chief Complaint   Patient presents with    Otalgia     R ear pain        HISTORY OF THE PRESENT ILLNESS: Bella is a pleasant 72 y.o. female here today to her overall health prior to flying to [facility] but she will stay for 1 month.    Patient states she recently was sick an has since developed some vertigo when repositioning in bed.  Patient states when she turns to her left or her right, the room will spin for a few moments before it is stable again.  She is requesting that I look at her ears.    Patient also states that she has a difficult time flying in the flight to [facility] could take 1 to 2 days.  She has used Ambien in the past with good results.  She is asking if she can get a prescription to use for her travels.    She also states she is fearful of getting sick while she is over there as they do not have medical care.  She is inquiring about obtaining a prescription of for Paxlovid to have on hand in case she gets COVID.  She does have her COVID-vaccine and her booster.    ROS:  All systems negative expect as addressed in assessment and plan.     Objective:     Exam:  /86 (BP Location: Left arm, Patient Position: Sitting, BP Cuff Size: Adult)   Pulse 71   Temp 36.9 °C (98.4 °F) (Temporal)   Ht 1.626 m (5' 4\")   Wt 76.4 kg (168 lb 6.4 oz) Comment: Pt states  SpO2 95%   BMI 28.91 kg/m²  Body mass index is 28.91 kg/m².    Physical Exam  Vitals reviewed.   Constitutional:       Appearance: Normal appearance.   HENT:      Right Ear: Tympanic membrane normal.      Left Ear: Tympanic membrane normal.   Cardiovascular:      Rate and Rhythm: Normal rate.      Pulses: Normal pulses.      Heart sounds: Normal heart sounds.   Pulmonary:      Effort: Pulmonary effort is normal.      Breath sounds: Normal breath sounds.   Musculoskeletal:         General: Normal range of motion.   Skin:     General: Skin is warm and dry.   Neurological:      Mental Status: Mental status is at " baseline.   Psychiatric:         Mood and Affect: Mood normal.         Behavior: Behavior normal.       Labs: Results reviewed from 08/11/22    Assessment & Plan:     72 y.o. female with the following -    1. Vertigo  This is a new problem.  I did look in the patient's ears and they are normal.  I let the patient know she may have BPPV.  I offered directions on Epley maneuver, but she states she does not need them as she has used this in the past and knows what to do.  I offered her meclizine for her symptoms. She states they are not bad enough to take medication.    2. Travel advice encounter   I considered giving the patient a prescription for Paxlovid.  I did review her labs and they are normal.  She does not take any medications.  However, after discussing this further, I decided against providing a prescription as she is not considered high risk, and she is fully vaccinated.  I let her know my concerns are that she may decide to take this down the road when her labs are not stable, or the, the medication could be handed off to somebody who has contraindications to taking it.    3. Need for vaccination  - Hep B Adult 20+    4. Moderate persistent asthma with exacerbation  Chronic, stable.  Refill provided for her trip.  - albuterol 108 (90 Base) MCG/ACT Aero Soln inhalation aerosol; Inhale 2 Puffs every 6 hours as needed for Shortness of Breath.  Dispense: 8.5 g; Refill: 0    5. Situational insomnia  Controlled substance discussed with client. Client agrees to take this medication as directed.  15 tablet supply provided to patient for her flight.  - zolpidem (AMBIEN) 5 MG Tab; Take 1 Tablet by mouth at bedtime as needed for Sleep for up to 30 days.  Dispense: 15 Tablet; Refill: 0    Patient was educated in proper administration of medication(s) ordered today including safety, possible SE, risks, benefits, rationale and alternatives to therapy.   Supportive care, differential diagnoses, and indications for  immediate follow-up discussed with patient.    Pathogenesis of diagnosis discussed including typical length and natural progression.    Instructed to return to clinic or nearest emergency department for any change in condition, further concerns, or worsening of symptoms.  Patient states understanding of the plan of care and discharge instructions.    Return if symptoms worsen or fail to improve.    I spent a total of 32 minutes with record review, exam, and communication with the patient, communication with other providers, and documentation of this encounter. This does not include time spent on separately billable procedures/tests.    I have placed the above orders and discussed them with an approved delegating provider.  The MA is performing the below orders under the direction of Dr. Lozada.    Please note that this dictation was created using voice recognition software. I have worked with consultants from the vendor as well as technical experts from [facility] to optimize the interface. I have made every reasonable attempt to correct obvious errors, but I expect that there are errors of grammar and possibly content that I did not discover before finalizing the note.

## 2022-10-10 ENCOUNTER — HOSPITAL ENCOUNTER (OUTPATIENT)
Dept: RADIOLOGY | Facility: MEDICAL CENTER | Age: 72
End: 2022-10-10
Payer: MEDICARE

## 2022-10-10 DIAGNOSIS — Z12.31 ENCOUNTER FOR SCREENING MAMMOGRAM FOR BREAST CANCER: ICD-10-CM

## 2022-10-10 PROCEDURE — 77063 BREAST TOMOSYNTHESIS BI: CPT

## 2022-11-07 ENCOUNTER — PATIENT MESSAGE (OUTPATIENT)
Dept: HEALTH INFORMATION MANAGEMENT | Facility: OTHER | Age: 72
End: 2022-11-07

## 2022-11-16 ENCOUNTER — OFFICE VISIT (OUTPATIENT)
Dept: MEDICAL GROUP | Facility: PHYSICIAN GROUP | Age: 72
End: 2022-11-16
Payer: MEDICARE

## 2022-11-16 VITALS
WEIGHT: 165.6 LBS | OXYGEN SATURATION: 96 % | TEMPERATURE: 99.8 F | SYSTOLIC BLOOD PRESSURE: 112 MMHG | HEART RATE: 81 BPM | BODY MASS INDEX: 28.27 KG/M2 | HEIGHT: 64 IN | DIASTOLIC BLOOD PRESSURE: 68 MMHG

## 2022-11-16 DIAGNOSIS — B37.9 YEAST INFECTION: ICD-10-CM

## 2022-11-16 DIAGNOSIS — J01.10 ACUTE NON-RECURRENT FRONTAL SINUSITIS: ICD-10-CM

## 2022-11-16 PROCEDURE — 99214 OFFICE O/P EST MOD 30 MIN: CPT

## 2022-11-16 RX ORDER — AMOXICILLIN AND CLAVULANATE POTASSIUM 875; 125 MG/1; MG/1
1 TABLET, FILM COATED ORAL 2 TIMES DAILY
Qty: 10 TABLET | Refills: 0 | Status: SHIPPED | OUTPATIENT
Start: 2022-11-16 | End: 2022-11-21

## 2022-11-16 RX ORDER — FLUCONAZOLE 150 MG/1
150 TABLET ORAL ONCE
Qty: 1 TABLET | Refills: 0 | Status: SHIPPED | OUTPATIENT
Start: 2022-11-16 | End: 2022-11-16

## 2022-11-16 ASSESSMENT — FIBROSIS 4 INDEX: FIB4 SCORE: 1.08

## 2022-11-16 NOTE — PROGRESS NOTES
"Subjective:     CC:   Chief Complaint   Patient presents with    Cough     X2 weeks    Nasal Congestion     X2 weeks    Headache     X4 days        HISTORY OF THE PRESENT ILLNESS: Bella is a pleasant 72 y.o. female here today for symptoms consistent with a sinus infection.  Patient states for approximately 16 days, she has had:  Sinus pain and pressure  Ear pain and fullness  Nasal congestion   Productive cough  Low-grade fever 99s, which she states is high for her.    The past week, her symptoms have worsened to include fatigue and headache.   She denies shortness of breath or sore throat  She has been taking Tylenol cold and flu as well as home remedies including garlic.    ROS:  All systems negative expect as addressed in assessment and plan.     Objective:     Exam:  /68 (BP Location: Left arm, Patient Position: Sitting, BP Cuff Size: Adult)   Pulse 81   Temp 37.7 °C (99.8 °F) (Temporal)   Ht 1.626 m (5' 4\")   Wt 75.1 kg (165 lb 9.6 oz) Comment: pt states. Pt refused  SpO2 96%   BMI 28.43 kg/m²  Body mass index is 28.43 kg/m².    Physical Exam  Vitals reviewed.   Constitutional:       Appearance: Normal appearance.   HENT:      Head:      Comments: +Frontal and maxillary sinus pain with palpation     Right Ear: Tympanic membrane normal.      Left Ear: Tympanic membrane normal.      Nose: Congestion present.   Cardiovascular:      Rate and Rhythm: Normal rate.      Pulses: Normal pulses.   Pulmonary:      Effort: Pulmonary effort is normal.      Breath sounds: Normal breath sounds.   Skin:     General: Skin is warm and dry.   Neurological:      Mental Status: Mental status is at baseline.   Psychiatric:         Mood and Affect: Mood normal.         Behavior: Behavior normal.       Assessment & Plan:     72 y.o. female with the following -    1. Acute non-recurrent frontal sinusitis  - amoxicillin-clavulanate (AUGMENTIN) 875-125 MG Tab; Take 1 Tablet by mouth 2 times a day for 5 days.  Dispense: 10 " Tablet; Refill: 0    2. Yeast infection  Patient states she typically gets a yeast infection when taking antibiotics.  I will prescribe Diflucan for prevention.  Patient will only take this if she develops symptoms.  - fluconazole (DIFLUCAN) 150 MG tablet; Take 1 Tablet by mouth one time for 1 dose.  Dispense: 1 Tablet; Refill: 0    Patient was educated in proper administration of medication(s) ordered today including safety, possible SE, risks, benefits, rationale and alternatives to therapy.   Supportive care, differential diagnoses, and indications for immediate follow-up discussed with patient.    Pathogenesis of diagnosis discussed including typical length and natural progression.    Instructed to return to clinic or nearest emergency department for any change in condition, further concerns, or worsening of symptoms.  Patient states understanding of the plan of care and discharge instructions.    Return if symptoms worsen or fail to improve.    I have placed the above orders and discussed them with an approved delegating provider.  The MA is performing the below orders under the direction of Dr. Lozada.    Please note that this dictation was created using voice recognition software. I have worked with consultants from the vendor as well as technical experts from [facility] [employer] to optimize the interface. I have made every reasonable attempt to correct obvious errors, but I expect that there are errors of grammar and possibly content that I did not discover before finalizing the note.

## 2022-12-14 ENCOUNTER — OFFICE VISIT (OUTPATIENT)
Dept: MEDICAL GROUP | Facility: PHYSICIAN GROUP | Age: 72
End: 2022-12-14
Payer: MEDICARE

## 2022-12-14 VITALS
WEIGHT: 167.2 LBS | HEART RATE: 86 BPM | HEIGHT: 64 IN | BODY MASS INDEX: 28.54 KG/M2 | OXYGEN SATURATION: 95 % | DIASTOLIC BLOOD PRESSURE: 80 MMHG | TEMPERATURE: 98.1 F | SYSTOLIC BLOOD PRESSURE: 122 MMHG

## 2022-12-14 DIAGNOSIS — R50.9 FEVER, UNSPECIFIED FEVER CAUSE: ICD-10-CM

## 2022-12-14 DIAGNOSIS — R09.81 NASAL CONGESTION: ICD-10-CM

## 2022-12-14 DIAGNOSIS — R52 BODY ACHES: ICD-10-CM

## 2022-12-14 DIAGNOSIS — J34.89 PAIN OF MAXILLARY SINUS: ICD-10-CM

## 2022-12-14 DIAGNOSIS — H65.191 OTHER NON-RECURRENT ACUTE NONSUPPURATIVE OTITIS MEDIA OF RIGHT EAR: ICD-10-CM

## 2022-12-14 LAB
FLUAV+FLUBV AG SPEC QL IA: NEGATIVE
INT CON NEG: NORMAL
INT CON POS: NORMAL

## 2022-12-14 PROCEDURE — 87804 INFLUENZA ASSAY W/OPTIC: CPT

## 2022-12-14 PROCEDURE — 99214 OFFICE O/P EST MOD 30 MIN: CPT

## 2022-12-14 RX ORDER — OXYMETAZOLINE HYDROCHLORIDE 0.05 G/100ML
2 SPRAY NASAL 2 TIMES DAILY
Qty: 15 ML | Refills: 0 | Status: SHIPPED | OUTPATIENT
Start: 2022-12-14 | End: 2022-12-17

## 2022-12-14 RX ORDER — AMOXICILLIN AND CLAVULANATE POTASSIUM 875; 125 MG/1; MG/1
1 TABLET, FILM COATED ORAL 2 TIMES DAILY
Qty: 10 TABLET | Refills: 0 | Status: SHIPPED | OUTPATIENT
Start: 2022-12-14 | End: 2022-12-19

## 2022-12-14 RX ORDER — FLUCONAZOLE 150 MG/1
TABLET ORAL
COMMUNITY
Start: 2022-11-16 | End: 2023-10-16

## 2022-12-14 ASSESSMENT — FIBROSIS 4 INDEX: FIB4 SCORE: 1.08

## 2022-12-14 NOTE — PROGRESS NOTES
"Subjective:     CC:   Chief Complaint   Patient presents with    Headache     X3 days    Nasal Congestion     X3 days    Fever     X3 days    Body Aches     X3 days     HISTORY OF THE PRESENT ILLNESS: Bella is a pleasant 72 y.o. female here today with complaints of upper respiratory symptoms over the last 3 days.  Patient did have a sinus infection approximately 1 month ago, for which she is treated with antibiotics.  She states this did help and she had improvement, however, her symptoms have returned.    Patient states she has had a fever around 101.  She wakes up in the middle of the night sweating and with chills.  She also has nasal congestion with maxillary and frontal sinus pain and pressure.  Her ears also feel full and throbbing with pain in the right.  She states she has a little cough but nothing severe.  She denies sore throat, shortness of breath or wheezing.    Patient typically treats herself with natural remedies including grapeseed oil.  She states she was trying to manage at home, but her symptoms have worsened over the last 3 days and so decided to come in.    ROS:  All systems negative expect as addressed in assessment and plan.     Objective:     Exam:  /80 (BP Location: Left arm, Patient Position: Sitting, BP Cuff Size: Adult)   Pulse 86   Temp 36.7 °C (98.1 °F) (Temporal)   Ht 1.626 m (5' 4\")   Wt 75.8 kg (167 lb 3.2 oz) Comment: pt states  SpO2 95%   BMI 28.70 kg/m²  Body mass index is 28.7 kg/m².    Physical Exam  Constitutional:       Appearance: She is ill-appearing.   HENT:      Right Ear: Tympanic membrane is bulging.      Left Ear: Tympanic membrane normal.   Cardiovascular:      Rate and Rhythm: Normal rate.   Pulmonary:      Effort: Pulmonary effort is normal.      Breath sounds: Normal breath sounds.   Skin:     General: Skin is warm and dry.   Neurological:      General: No focal deficit present.      Mental Status: She is alert.     Labs: Results reviewed from " 08/11/22    Assessment & Plan:     72 y.o. female with the following -    1. Fever, unspecified fever cause  - POCT Influenza A/B - NEGATIVE    2. Other non-recurrent acute nonsuppurative otitis media of right ear  3. Body aches  4. Pain of maxillary sinus  5. Nasal congestion  - amoxicillin-clavulanate (AUGMENTIN) 875-125 MG Tab; Take 1 Tablet by mouth 2 times a day for 5 days.  Dispense: 10 Tablet; Refill: 0  - oxymetazoline (AFRIN 12 HOUR) 0.05 % Solution; Administer 2 Sprays into affected nostril(S) 2 times a day for 3 days.  Dispense: 15 mL; Refill: 0    Patient was educated in proper administration of medication(s) ordered today including safety, possible SE, risks, benefits, rationale and alternatives to therapy.   Supportive care, differential diagnoses, and indications for immediate follow-up discussed with patient.    Pathogenesis of diagnosis discussed including typical length and natural progression.    Instructed to return to clinic or nearest emergency department for any change in condition, further concerns, or worsening of symptoms.  Patient states understanding of the plan of care and discharge instructions.    Return if symptoms worsen or fail to improve.    I spent a total of 34 minutes with record review, exam, and communication with the patient, communication with other providers, and documentation of this encounter. This does not include time spent on separately billable procedures/tests.    I have placed the above orders and discussed them with an approved delegating provider.  The MA is performing the below orders under the direction of Dr. Lozada.    Please note that this dictation was created using voice recognition software. I have worked with consultants from the vendor as well as technical experts from [facility] to optimize the interface. I have made every reasonable attempt to correct obvious errors, but I expect that there are errors of grammar and possibly content that I did not  discover before finalizing the note.

## 2023-08-09 ENCOUNTER — OFFICE VISIT (OUTPATIENT)
Dept: MEDICAL GROUP | Facility: PHYSICIAN GROUP | Age: 73
End: 2023-08-09
Payer: MEDICARE

## 2023-08-09 VITALS
DIASTOLIC BLOOD PRESSURE: 84 MMHG | TEMPERATURE: 98.1 F | SYSTOLIC BLOOD PRESSURE: 122 MMHG | OXYGEN SATURATION: 96 % | WEIGHT: 187 LBS | BODY MASS INDEX: 31.92 KG/M2 | HEART RATE: 79 BPM | HEIGHT: 64 IN

## 2023-08-09 DIAGNOSIS — Z23 NEED FOR VACCINATION: ICD-10-CM

## 2023-08-09 DIAGNOSIS — R63.5 WEIGHT GAIN: ICD-10-CM

## 2023-08-09 DIAGNOSIS — I10 BENIGN ESSENTIAL HTN: ICD-10-CM

## 2023-08-09 DIAGNOSIS — E66.9 OBESITY (BMI 30-39.9): ICD-10-CM

## 2023-08-09 DIAGNOSIS — M85.80 OSTEOPENIA, UNSPECIFIED LOCATION: ICD-10-CM

## 2023-08-09 DIAGNOSIS — R73.03 PREDIABETES: ICD-10-CM

## 2023-08-09 DIAGNOSIS — Z00.00 WELLNESS EXAMINATION: ICD-10-CM

## 2023-08-09 DIAGNOSIS — Z00.00 MEDICARE ANNUAL WELLNESS VISIT, SUBSEQUENT: Primary | ICD-10-CM

## 2023-08-09 DIAGNOSIS — E78.00 HIGH CHOLESTEROL: ICD-10-CM

## 2023-08-09 PROCEDURE — 3079F DIAST BP 80-89 MM HG: CPT

## 2023-08-09 PROCEDURE — 3074F SYST BP LT 130 MM HG: CPT

## 2023-08-09 PROCEDURE — G0439 PPPS, SUBSEQ VISIT: HCPCS

## 2023-08-09 ASSESSMENT — ENCOUNTER SYMPTOMS: GENERAL WELL-BEING: GOOD

## 2023-08-09 ASSESSMENT — ACTIVITIES OF DAILY LIVING (ADL): BATHING_REQUIRES_ASSISTANCE: 0

## 2023-08-09 ASSESSMENT — FIBROSIS 4 INDEX: FIB4 SCORE: 1.1

## 2023-08-09 ASSESSMENT — PATIENT HEALTH QUESTIONNAIRE - PHQ9: CLINICAL INTERPRETATION OF PHQ2 SCORE: 0

## 2023-08-09 NOTE — PROGRESS NOTES
Chief Complaint   Patient presents with    Medicare Annual Wellness     HPI:  Bella Navarro is a 73 y.o. here for Medicare Annual Wellness Visit.  Patient most concerned today about her weight gain.  She has gained 20 pounds since November.   She lost 40 pounds originally with strong effort but now can't keep it off.  She reports counting calories and stays below 1,000 calories/day. She eats about 20 carbs a day.  She states she eats out for all her meals. She does not enjoy cooking and has made a decision not to cook. She also admits to not being good at eating healthy when she is traveling.  Patient states she has been very proactive trying to lose weight.  For the last two years, she has been eating a low-carb, low-fat diet.  Also does intermittent fasting and typically eats between noon-6pm.  She does yoga and walks daily from her shed to her house, which equals about 10,000 steps.  She was able to get off her BP medications with weight loss and would like to stay off them.  She is not interested in GLP-1s but is open to other medications. She wants to be aggressive at losing weight.    Patient Active Problem List    Diagnosis Date Noted    Weight gain 08/09/2023    Vertigo 09/16/2022    Travel advice encounter 09/16/2022    Situational insomnia 09/16/2022    High cholesterol 12/29/2021    Prediabetes 12/29/2021    Obesity (BMI 30-39.9) 08/28/2019    Skin aging 06/27/2019    Osteopenia 05/07/2019    Snoring 04/17/2019    History of skin cancer 02/05/2019    Chronic pain of both shoulders 05/16/2017    Mild intermittent asthma without complication 03/22/2016    Benign essential HTN 08/09/2014       Current Outpatient Medications   Medication Sig Dispense Refill    Zoster Vac Recomb Adjuvanted (SHINGRIX) 50 MCG/0.5ML Recon Susp Inject 0.5 mL into the shoulder, thigh, or buttocks one time for 1 dose. 0.5 mL 0    fluconazole (DIFLUCAN) 150 MG tablet       albuterol 108 (90 Base) MCG/ACT Aero Soln inhalation  aerosol Inhale 2 Puffs every 6 hours as needed for Shortness of Breath. 8.5 g 0     No current facility-administered medications for this visit.          Current supplements as per medication list.     Allergies: Codeine, Codeine, Eggs, Latex, Pcn [penicillins], and Tape    Current social contact/activities: She belongs to a [employer] group and enjoy quilting. She meets with this group once a month. Patient and her  enjoy going out to eat. She states she is done cooking, so generally they go out to eat.  She also enjoys traveling and spending time with her grandkids and her family.     She  reports that she quit smoking about 50 years ago. Her smoking use included cigarettes. She started smoking about 55 years ago. She has a 5.00 pack-year smoking history. She has never used smokeless tobacco. She reports that she does not drink alcohol and does not use drugs.  Counseling given: Not Answered  Tobacco comments: continue to avoid    ROS:    Gait: Uses no assistive device  Ostomy: No  Other tubes: No  Amputations: No  Chronic oxygen use: No  Last eye exam: 01/01/2023  Wears hearing aids: No   : Denies any urinary leakage during the last 6 months    Screening:  Depression Screening  Little interest or pleasure in doing things?  0 - not at all  Feeling down, depressed , or hopeless? 0 - not at all  Patient Health Questionnaire Score: 0     If depressive symptoms identified deferred to follow up visit unless specifically addressed in assessment and plan.    Interpretation of PHQ-9 Total Score   Score Severity   1-4 No Depression   5-9 Mild Depression   10-14 Moderate Depression   15-19 Moderately Severe Depression   20-27 Severe Depression    Screening for Cognitive Impairment  Three Minute Recall (daughter, heaven, mountain) 3/3    Ron clock face with all 12 numbers and set the hands to show 10 past 11.  Yes    Cognitive concerns identified deferred for follow up unless specifically addressed in assessment and  plan.    Fall Risk Assessment  Has the patient had two or more falls in the last year or any fall with injury in the last year?  No    Safety Assessment  Throw rugs on floor.  Yes  Handrails on all stairs.  No  Good lighting in all hallways.  Yes  Difficulty hearing.  No  Patient counseled about all safety risks that were identified.    Functional Assessment ADLs  Are there any barriers preventing you from cooking for yourself or meeting nutritional needs?  No.    Are there any barriers preventing you from driving safely or obtaining transportation?  No.    Are there any barriers preventing you from using a telephone or calling for help?  No.    Are there any barriers preventing you from shopping?  No.    Are there any barriers preventing you from taking care of your own finances?  No.    Are there any barriers preventing you from managing your medications?  No.    Are there any barriers preventing you from showering, bathing or dressing yourself?  No.    Are you currently engaging in any exercise or physical activity?  Yes.     What is your perception of your health?  Good    Advance Care Planning  Do you have an Advance Directive, Living Will, Durable Power of , or POLST? No            Information packet provided.    Health Maintenance Summary            Overdue - IMM HEP B VACCINE (3 of 3 - Hep B Twinrix 3-dose series) Overdue since 2/16/2023 09/16/2022  Imm Admin: Hepatitis B Vaccine (Adol/Adult)    09/11/2006  Imm Admin: Hep A/HEP B Combined Vaccine (TwinRix)              Postponed - IMM PNEUMOCOCCAL VACCINE: 65+ Years (1 - PCV) Postponed until 8/9/2023      No completion history exists for this topic.              Postponed - IMM ZOSTER VACCINES (1 of 2) Postponed until 1/9/2024      No completion history exists for this topic.              Postponed - COVID-19 Vaccine (5 - Pfizer series) Postponed until 8/9/2024 07/19/2022  Imm Admin: MODERNA SARS-COV-2 VACCINE (12+)    10/06/2021  Imm  Admin: PFIZER PURPLE CAP SARS-COV-2 VACCINATION (12+)    04/02/2021  Imm Admin: PFIZER PURPLE CAP SARS-COV-2 VACCINATION (12+)    03/11/2021  Imm Admin: PFIZER PURPLE CAP SARS-COV-2 VACCINATION (12+)              IMM INFLUENZA (1) Next due on 9/1/2023      No completion history exists for this topic.              BONE DENSITY (Every 5 Years) Next due on 5/7/2024 05/07/2019  DS-BONE DENSITY STUDY (DEXA)              Annual Wellness Visit (Every 366 Days) Next due on 8/9/2024 08/09/2023  Done    08/09/2023  Visit Dx: Medicare annual wellness visit, subsequent    12/29/2021  Level of Service: ANNUAL WELLNESS VISIT-INCLUDES PPPS SUBSEQUE*    12/29/2021  Visit Dx: Medicare annual wellness visit, subsequent              MAMMOGRAM (Every 2 Years) Next due on 10/10/2024      10/10/2022  MA-SCREENING MAMMO BILAT W/IMPLANTS W/[place] W/CAD    05/07/2019  MA-MAMMO SCREEN BILAT IMPLANTS [place] CAD    08/01/2017  MA-MAMMO SCREEN BILAT IMPLANTS [place] CAD              IMM DTaP/Tdap/Td Vaccine (2 - Td or Tdap) Next due on 8/18/2026 08/18/2016  Imm Admin: Tdap Vaccine              COLORECTAL CANCER SCREENING (COLONOSCOPY - Every 10 Years) Next due on 5/16/2027 05/16/2017  REFERRAL TO GI FOR COLONOSCOPY              HEPATITIS C SCREENING  Completed      08/11/2022  Hepatitis C Antibody component of HEP C VIRUS ANTIBODY              HPV Vaccines (Series Information) Aged Out      No completion history exists for this topic.              IMM MENINGOCOCCAL ACWY VACCINE (Series Information) Aged Out      No completion history exists for this topic.                  Patient Care Team:  CORRINA Gutiérrez as PCP - General (Nurse Practitioner Family)  Ang Gregorio M.D. as Consulting Physician (Allergy and Immunology)  Martinez Siegel M.D. as Consulting Physician (Gastroenterology)    Social History     Tobacco Use    Smoking status: Former     Packs/day: 1.00     Years: 5.00     Pack years: 5.00     Types:  Cigarettes     Start date: 1968     Quit date: 1973     Years since quittin.6    Smokeless tobacco: Never    Tobacco comments:     continue to avoid   Vaping Use    Vaping Use: Never used   Substance Use Topics    Alcohol use: No     Alcohol/week: 0.0 oz    Drug use: No     Family History   Problem Relation Age of Onset    Stroke Mother     Heart Attack Father      She  has a past medical history of Anesthesia, Arthritis (2019), ASTHMA, Back pain, Bile leak, Bronchitis, Cancer ([facility]) (), Chickenpox, Cold (01/10/2019), Diabetes (), Hypertension, Infectious disease (2019), Influenza, Kidney stone, Nasal drainage, Nephrolithiasis (), Obesity, Pain, Pneumonia, Restless leg syndrome, Snoring, and Tonsillitis.    She has no past medical history of CAD (coronary artery disease), COPD, or Liver disease.   Past Surgical History:   Procedure Laterality Date    LESION EXCISION GENERAL Right 2019    Procedure: LESION EXCISION GENERAL - SQUAMOUS CELL CARCINOMA NOSE;  Surgeon: Martinez Martin M.D.;  Location: [facility];  Service: Plastics    FLAP CLOSURE Right 2019    Procedure: FLAP CLOSURE - not done;  Surgeon: Martinez Martin M.D.;  Location: [facility];  Service: Plastics    BREAST IMPLANT REMOVAL Bilateral 1/15/2016    Procedure: BREAST IMPLANT EXCHANGE;  Surgeon: Martinez Martin M.D.;  Location: [facility];  Service:     CAPSULECTOMY Bilateral 1/15/2016    Procedure: CAPSULECTOMY AND CAPSULORRHAPHIES;  Surgeon: Martinez Martin M.D.;  Location: [facility];  Service:     MASTOPEXY Bilateral 1/15/2016    Procedure: MASTOPEXY BENELLI;  Surgeon: Martinez Martin M.D.;  Location: [facility];  Service:     ERCP  10/8/2014    Performed by Martinez Siegel M.D. at [facility]    ERCP IN OR  2014    Performed by Martinez Siegel M.D. at [facility]    DEEPALI BY LAPAROSCOPY  2014     "Performed by [place]as M.D. at SURGERY [facility] ORS    AR BREAST AUGMENTATION WITH IMPLANT  1996    CHOLECYSTECTOMY      TONSILLECTOMY       Exam:   /84 (BP Location: Left arm, Patient Position: Sitting, BP Cuff Size: Adult)   Pulse 79   Temp 36.7 °C (98.1 °F) (Temporal)   Ht 1.626 m (5' 4\")   Wt 84.8 kg (187 lb)   SpO2 96%  Body mass index is 32.1 kg/m².    Hearing good.    Dentition good  Alert, oriented in no acute distress.  Eye contact is good, speech goal directed, affect calm    Assessment and Plan. The following treatment and monitoring plan is recommended:    1. Medicare annual wellness visit, subsequent  2. Wellness examination  - TSH WITH REFLEX TO FT4; Future  - VITAMIN D,25 HYDROXY (DEFICIENCY); Future  - Lipid Profile; Future  - HEMOGLOBIN A1C; Future  - Comp Metabolic Panel; Future  - CBC WITH DIFFERENTIAL; Future    3. Weight gain  4. Obesity (BMI 30-39.9)  Discussed with patient switching up her exercise importance of strength training.  Patient encouraged to start incorporating resistance training to build more muscle.  Talked with patient about diet.  She might think she is eating less than she is, especially if she is consistently eating out and not cooking her food at home.  She is interested in meeting with the weight loss specialist.   Will place referral to Dr. Lara's weight loss clinic for added weight loss support.  Update: Referral faxed to [phone].    - TSH WITH REFLEX TO FT4; Future  - Lipid Profile; Future  - HEMOGLOBIN A1C; Future  - Comp Metabolic Panel; Future  - CBC WITH DIFFERENTIAL; Future    5. Osteopenia, unspecified location  - VITAMIN D,25 HYDROXY (DEFICIENCY); Future    6. Prediabetes  - HEMOGLOBIN A1C; Future    7. High cholesterol  - Lipid Profile; Future    8. Benign essential HTN  - CBC WITH DIFFERENTIAL; Future    9. Need for vaccination  - Zoster Vac Recomb Adjuvanted (SHINGRIX) 50 MCG/0.5ML Recon Susp; Inject 0.5 mL into the shoulder, " thigh, or buttocks one time for 1 dose.  Dispense: 0.5 mL; Refill: 0    Services suggested: No services needed at this time  Health Care Screening: Age-appropriate preventive services recommended by USPTF and ACIP covered by Medicare were discussed today. Services ordered if indicated and agreed upon by the patient.  Referrals offered: Community-based lifestyle interventions to reduce health risks and promote self-management and wellness, fall prevention, nutrition, physical activity, tobacco-use cessation, weight loss, and mental health services as per orders if indicated.    Discussion today about general wellness and lifestyle habits:    Prevent falls and reduce trip hazards; Cautioned about securing or removing rugs.  Have a working fire alarm and carbon monoxide detector;   Engage in regular physical activity and social activities     Follow-up: Return in about 2 months (around 10/9/2023) for Establish Care with new PCP.

## 2023-10-11 ENCOUNTER — APPOINTMENT (OUTPATIENT)
Dept: SPORTS MEDICINE | Facility: CLINIC | Age: 73
End: 2023-10-11
Payer: MEDICARE

## 2023-10-13 SDOH — HEALTH STABILITY: PHYSICAL HEALTH: ON AVERAGE, HOW MANY DAYS PER WEEK DO YOU ENGAGE IN MODERATE TO STRENUOUS EXERCISE (LIKE A BRISK WALK)?: 1 DAY

## 2023-10-13 SDOH — ECONOMIC STABILITY: INCOME INSECURITY: IN THE LAST 12 MONTHS, WAS THERE A TIME WHEN YOU WERE NOT ABLE TO PAY THE MORTGAGE OR RENT ON TIME?: NO

## 2023-10-13 SDOH — ECONOMIC STABILITY: INCOME INSECURITY: HOW HARD IS IT FOR YOU TO PAY FOR THE VERY BASICS LIKE FOOD, HOUSING, MEDICAL CARE, AND HEATING?: NOT HARD AT ALL

## 2023-10-13 SDOH — ECONOMIC STABILITY: HOUSING INSECURITY: IN THE LAST 12 MONTHS, HOW MANY PLACES HAVE YOU LIVED?: 1

## 2023-10-13 SDOH — ECONOMIC STABILITY: FOOD INSECURITY: WITHIN THE PAST 12 MONTHS, YOU WORRIED THAT YOUR FOOD WOULD RUN OUT BEFORE YOU GOT MONEY TO BUY MORE.: NEVER TRUE

## 2023-10-13 SDOH — ECONOMIC STABILITY: FOOD INSECURITY: WITHIN THE PAST 12 MONTHS, THE FOOD YOU BOUGHT JUST DIDN'T LAST AND YOU DIDN'T HAVE MONEY TO GET MORE.: NEVER TRUE

## 2023-10-13 SDOH — HEALTH STABILITY: MENTAL HEALTH
STRESS IS WHEN SOMEONE FEELS TENSE, NERVOUS, ANXIOUS, OR CAN'T SLEEP AT NIGHT BECAUSE THEIR MIND IS TROUBLED. HOW STRESSED ARE YOU?: NOT AT ALL

## 2023-10-13 SDOH — HEALTH STABILITY: PHYSICAL HEALTH: ON AVERAGE, HOW MANY MINUTES DO YOU ENGAGE IN EXERCISE AT THIS LEVEL?: 30 MIN

## 2023-10-13 ASSESSMENT — SOCIAL DETERMINANTS OF HEALTH (SDOH)
DO YOU BELONG TO ANY CLUBS OR ORGANIZATIONS SUCH AS CHURCH GROUPS UNIONS, FRATERNAL OR ATHLETIC GROUPS, OR SCHOOL GROUPS?: YES
HOW OFTEN DO YOU HAVE A DRINK CONTAINING ALCOHOL: NEVER
IN A TYPICAL WEEK, HOW MANY TIMES DO YOU TALK ON THE PHONE WITH FAMILY, FRIENDS, OR NEIGHBORS?: THREE TIMES A WEEK
HOW HARD IS IT FOR YOU TO PAY FOR THE VERY BASICS LIKE FOOD, HOUSING, MEDICAL CARE, AND HEATING?: NOT HARD AT ALL
HOW OFTEN DO YOU GET TOGETHER WITH FRIENDS OR RELATIVES?: TWICE A WEEK
DO YOU BELONG TO ANY CLUBS OR ORGANIZATIONS SUCH AS CHURCH GROUPS UNIONS, FRATERNAL OR ATHLETIC GROUPS, OR SCHOOL GROUPS?: YES
HOW OFTEN DO YOU HAVE SIX OR MORE DRINKS ON ONE OCCASION: NEVER
HOW OFTEN DO YOU ATTEND CHURCH OR RELIGIOUS SERVICES?: 1 TO 4 TIMES PER YEAR
HOW OFTEN DO YOU ATTENT MEETINGS OF THE CLUB OR ORGANIZATION YOU BELONG TO?: 1 TO 4 TIMES PER YEAR
WITHIN THE PAST 12 MONTHS, YOU WORRIED THAT YOUR FOOD WOULD RUN OUT BEFORE YOU GOT THE MONEY TO BUY MORE: NEVER TRUE
HOW MANY DRINKS CONTAINING ALCOHOL DO YOU HAVE ON A TYPICAL DAY WHEN YOU ARE DRINKING: PATIENT DOES NOT DRINK
HOW OFTEN DO YOU ATTENT MEETINGS OF THE CLUB OR ORGANIZATION YOU BELONG TO?: 1 TO 4 TIMES PER YEAR
IN A TYPICAL WEEK, HOW MANY TIMES DO YOU TALK ON THE PHONE WITH FAMILY, FRIENDS, OR NEIGHBORS?: THREE TIMES A WEEK
HOW OFTEN DO YOU GET TOGETHER WITH FRIENDS OR RELATIVES?: TWICE A WEEK
HOW OFTEN DO YOU ATTEND CHURCH OR RELIGIOUS SERVICES?: 1 TO 4 TIMES PER YEAR

## 2023-10-13 ASSESSMENT — LIFESTYLE VARIABLES
SKIP TO QUESTIONS 9-10: 1
HOW OFTEN DO YOU HAVE SIX OR MORE DRINKS ON ONE OCCASION: NEVER
AUDIT-C TOTAL SCORE: 0
HOW OFTEN DO YOU HAVE A DRINK CONTAINING ALCOHOL: NEVER
HOW MANY STANDARD DRINKS CONTAINING ALCOHOL DO YOU HAVE ON A TYPICAL DAY: PATIENT DOES NOT DRINK

## 2023-10-16 ENCOUNTER — HOSPITAL ENCOUNTER (OUTPATIENT)
Dept: LAB | Facility: MEDICAL CENTER | Age: 73
End: 2023-10-16
Attending: NURSE PRACTITIONER
Payer: MEDICARE

## 2023-10-16 ENCOUNTER — OFFICE VISIT (OUTPATIENT)
Dept: MEDICAL GROUP | Facility: PHYSICIAN GROUP | Age: 73
End: 2023-10-16
Payer: MEDICARE

## 2023-10-16 VITALS
TEMPERATURE: 97.7 F | DIASTOLIC BLOOD PRESSURE: 80 MMHG | BODY MASS INDEX: 33.46 KG/M2 | RESPIRATION RATE: 16 BRPM | WEIGHT: 196 LBS | HEIGHT: 64 IN | HEART RATE: 70 BPM | OXYGEN SATURATION: 97 % | SYSTOLIC BLOOD PRESSURE: 102 MMHG

## 2023-10-16 DIAGNOSIS — Z13.29 SCREENING FOR THYROID DISORDER: ICD-10-CM

## 2023-10-16 DIAGNOSIS — I10 BENIGN ESSENTIAL HTN: ICD-10-CM

## 2023-10-16 DIAGNOSIS — J01.40 SUBACUTE PANSINUSITIS: ICD-10-CM

## 2023-10-16 DIAGNOSIS — B37.31 VAGINAL YEAST INFECTION: ICD-10-CM

## 2023-10-16 DIAGNOSIS — E55.9 VITAMIN D DEFICIENCY: ICD-10-CM

## 2023-10-16 DIAGNOSIS — R73.03 PREDIABETES: ICD-10-CM

## 2023-10-16 DIAGNOSIS — Z13.1 ENCOUNTER FOR SCREENING FOR DIABETES MELLITUS: ICD-10-CM

## 2023-10-16 DIAGNOSIS — E06.3 HASHIMOTO'S THYROIDITIS: ICD-10-CM

## 2023-10-16 DIAGNOSIS — E78.5 DYSLIPIDEMIA: ICD-10-CM

## 2023-10-16 DIAGNOSIS — Z13.220 ENCOUNTER FOR SCREENING FOR LIPID DISORDER: ICD-10-CM

## 2023-10-16 DIAGNOSIS — Z83.49 FAMILY HISTORY OF HASHIMOTO THYROIDITIS: ICD-10-CM

## 2023-10-16 PROBLEM — Z71.84 TRAVEL ADVICE ENCOUNTER: Status: RESOLVED | Noted: 2022-09-16 | Resolved: 2023-10-16

## 2023-10-16 PROBLEM — R06.83 SNORING: Status: RESOLVED | Noted: 2019-04-17 | Resolved: 2023-10-16

## 2023-10-16 PROBLEM — L90.8 SKIN AGING: Status: RESOLVED | Noted: 2019-06-27 | Resolved: 2023-10-16

## 2023-10-16 LAB
25(OH)D3 SERPL-MCNC: 34 NG/ML (ref 30–100)
ALBUMIN SERPL BCP-MCNC: 3.9 G/DL (ref 3.2–4.9)
ALBUMIN/GLOB SERPL: 1.4 G/DL
ALP SERPL-CCNC: 73 U/L (ref 30–99)
ALT SERPL-CCNC: 18 U/L (ref 2–50)
ANION GAP SERPL CALC-SCNC: 14 MMOL/L (ref 7–16)
AST SERPL-CCNC: 15 U/L (ref 12–45)
BILIRUB SERPL-MCNC: 0.3 MG/DL (ref 0.1–1.5)
BUN SERPL-MCNC: 12 MG/DL (ref 8–22)
CALCIUM ALBUM COR SERPL-MCNC: 9.2 MG/DL (ref 8.5–10.5)
CALCIUM SERPL-MCNC: 9.1 MG/DL (ref 8.5–10.5)
CHLORIDE SERPL-SCNC: 104 MMOL/L (ref 96–112)
CHOLEST SERPL-MCNC: 257 MG/DL (ref 100–199)
CO2 SERPL-SCNC: 22 MMOL/L (ref 20–33)
CREAT SERPL-MCNC: 0.79 MG/DL (ref 0.5–1.4)
EST. AVERAGE GLUCOSE BLD GHB EST-MCNC: 126 MG/DL
GFR SERPLBLD CREATININE-BSD FMLA CKD-EPI: 80 ML/MIN/1.73 M 2
GLOBULIN SER CALC-MCNC: 2.8 G/DL (ref 1.9–3.5)
GLUCOSE SERPL-MCNC: 89 MG/DL (ref 65–99)
HBA1C MFR BLD: 6 % (ref 4–5.6)
HDLC SERPL-MCNC: 57 MG/DL
LDLC SERPL CALC-MCNC: 164 MG/DL
POTASSIUM SERPL-SCNC: 3.7 MMOL/L (ref 3.6–5.5)
PROT SERPL-MCNC: 6.7 G/DL (ref 6–8.2)
SODIUM SERPL-SCNC: 140 MMOL/L (ref 135–145)
T3FREE SERPL-MCNC: 3.04 PG/ML (ref 2–4.4)
T4 FREE SERPL-MCNC: 1.15 NG/DL (ref 0.93–1.7)
TRIGL SERPL-MCNC: 182 MG/DL (ref 0–149)
TSH SERPL DL<=0.005 MIU/L-ACNC: 1.68 UIU/ML (ref 0.38–5.33)

## 2023-10-16 PROCEDURE — 84481 FREE ASSAY (FT-3): CPT

## 2023-10-16 PROCEDURE — 84443 ASSAY THYROID STIM HORMONE: CPT

## 2023-10-16 PROCEDURE — 3079F DIAST BP 80-89 MM HG: CPT | Performed by: NURSE PRACTITIONER

## 2023-10-16 PROCEDURE — 84439 ASSAY OF FREE THYROXINE: CPT

## 2023-10-16 PROCEDURE — 82306 VITAMIN D 25 HYDROXY: CPT

## 2023-10-16 PROCEDURE — 83036 HEMOGLOBIN GLYCOSYLATED A1C: CPT | Mod: GA

## 2023-10-16 PROCEDURE — 80061 LIPID PANEL: CPT

## 2023-10-16 PROCEDURE — 99214 OFFICE O/P EST MOD 30 MIN: CPT | Performed by: NURSE PRACTITIONER

## 2023-10-16 PROCEDURE — 80053 COMPREHEN METABOLIC PANEL: CPT

## 2023-10-16 PROCEDURE — 3074F SYST BP LT 130 MM HG: CPT | Performed by: NURSE PRACTITIONER

## 2023-10-16 PROCEDURE — 36415 COLL VENOUS BLD VENIPUNCTURE: CPT

## 2023-10-16 RX ORDER — AZITHROMYCIN 250 MG/1
TABLET, FILM COATED ORAL
Qty: 6 TABLET | Refills: 0 | Status: SHIPPED | OUTPATIENT
Start: 2023-10-16 | End: 2023-11-09

## 2023-10-16 RX ORDER — FLUCONAZOLE 150 MG/1
150 TABLET ORAL DAILY
Qty: 1 TABLET | Refills: 0 | Status: SHIPPED | OUTPATIENT
Start: 2023-10-16 | End: 2023-11-09

## 2023-10-16 ASSESSMENT — FIBROSIS 4 INDEX: FIB4 SCORE: 1.1

## 2023-10-16 NOTE — PROGRESS NOTES
"  Chief Complaint   Patient presents with    Establish Care     Poss Sinus Infection/ Headache/ face pressure x 3 weeks                                                                                                                                       HPI:   Bella presents today with the following.    Problem   Hashimoto's Thyroiditis   Family History of Hashimoto Thyroiditis   Subacute Pansinusitis   Dyslipidemia   Prediabetes   Travel Advice Encounter (Resolved)   Skin Aging (Resolved)   Snoring (Resolved)       Current Outpatient Medications   Medication Sig Dispense Refill    fluconazole (DIFLUCAN) 150 MG tablet Take 1 Tablet by mouth every day. 1 Tablet 0    azithromycin (ZITHROMAX) 250 MG Tab Take 2 tablets by mouth on day 1, take 1 tablet by mouth on days 2-5. 6 Tablet 0    albuterol 108 (90 Base) MCG/ACT Aero Soln inhalation aerosol Inhale 2 Puffs every 6 hours as needed for Shortness of Breath. 8.5 g 0     No current facility-administered medications for this visit.       Allergies as of 10/16/2023 - Reviewed 10/16/2023   Allergen Reaction Noted    Codeine Anaphylaxis 08/06/2014    Eggs Diarrhea and Vomiting 08/06/2014    Latex Rash 01/13/2016    Pcn [penicillins] Shortness of Breath 08/06/2014    Tape  02/01/2019        ROS:  All systems negative expect as addressed in assessment and plan.     /80 (BP Location: Right arm, Patient Position: Sitting)   Pulse 70   Temp 36.5 °C (97.7 °F) (Temporal)   Resp 16   Ht 1.626 m (5' 4\")   Wt 88.9 kg (196 lb)   SpO2 97%   BMI 33.64 kg/m²       Physical Exam  Vitals reviewed.   Constitutional:       Appearance: Normal appearance.   HENT:      Head: Normocephalic and atraumatic.      Mouth/Throat:      Mouth: Mucous membranes are moist.   Eyes:      Extraocular Movements: Extraocular movements intact.      Conjunctiva/sclera: Conjunctivae normal.   Pulmonary:      Effort: Pulmonary effort is normal.   Musculoskeletal:         General: Normal range of " motion.      Cervical back: Normal range of motion.   Skin:     General: Skin is warm and dry.   Neurological:      General: No focal deficit present.      Mental Status: She is alert and oriented to person, place, and time.   Psychiatric:         Mood and Affect: Mood normal.         Behavior: Behavior normal.         Thought Content: Thought content normal.           Assessment and Plan:  73 y.o. female with the following issues.    1. Screening for thyroid disorder  TSH    FREE THYROXINE    TRIIDOTHYRONINE      2. Encounter for screening for lipid disorder  Lipid Profile      3. Encounter for screening for diabetes mellitus  HEMOGLOBIN A1C      4. Vitamin D deficiency  VITAMIN D,25 HYDROXY (DEFICIENCY)      5. Benign essential HTN  Comp Metabolic Panel      6. Prediabetes  Comp Metabolic Panel    HEMOGLOBIN A1C      7. Hashimoto's thyroiditis        8. Family history of Hashimoto thyroiditis        9. Dyslipidemia        10. Subacute pansinusitis        11. Vaginal yeast infection  fluconazole (DIFLUCAN) 150 MG tablet           Hashimoto's thyroiditis  Chronic stable condition.     Patient has elevated TPO antibodies. She has had normal TSH however have not had her T4 and T3.     Will check labs.     Dyslipidemia  Chronic stable.     Patient manages with diet.     Prediabetes  Chronic stable. Patient has a family history of diabetes.     Her last A1c was 5.8%.     Will continue to monitor blood sugar and A1c yearly.     Subacute pansinusitis  Acute. Patient has had increased nasal drainage and facial pressure for 3-4 weeks. She has been doing saline rinses and OTC nasal sprays to try to help. She states over the last few days she has been having chills and low grade fevers.     Patient is allergic to PCN. Will prescribe azithromycin for 5 days.           Return for as needed or yearly.      Please note that this dictation was created using voice recognition software. I have worked with consultants from the vendor  as well as technical experts from [facility] to optimize the interface. I have made every reasonable attempt to correct obvious errors, but I expect that there are errors of grammar and possibly content that I did not discover before finalizing the note.

## 2023-10-16 NOTE — ASSESSMENT & PLAN NOTE
Chronic stable. Patient has a family history of diabetes.     Her last A1c was 5.8%.     Will continue to monitor blood sugar and A1c yearly.

## 2023-10-16 NOTE — ASSESSMENT & PLAN NOTE
Chronic stable condition.     Patient has elevated TPO antibodies. She has had normal TSH however have not had her T4 and T3.     Will check labs.

## 2023-10-16 NOTE — ASSESSMENT & PLAN NOTE
Acute. Patient has had increased nasal drainage and facial pressure for 3-4 weeks. She has been doing saline rinses and OTC nasal sprays to try to help. She states over the last few days she has been having chills and low grade fevers.     Patient is allergic to PCN. Will prescribe azithromycin for 5 days.

## 2023-10-26 ENCOUNTER — DOCUMENTATION (OUTPATIENT)
Dept: HEALTH INFORMATION MANAGEMENT | Facility: OTHER | Age: 73
End: 2023-10-26
Payer: MEDICARE

## 2023-11-07 ENCOUNTER — APPOINTMENT (OUTPATIENT)
Dept: MEDICAL GROUP | Facility: PHYSICIAN GROUP | Age: 73
End: 2023-11-07
Payer: MEDICARE

## 2023-11-09 ENCOUNTER — OFFICE VISIT (OUTPATIENT)
Dept: MEDICAL GROUP | Facility: PHYSICIAN GROUP | Age: 73
End: 2023-11-09
Payer: MEDICARE

## 2023-11-09 VITALS
DIASTOLIC BLOOD PRESSURE: 84 MMHG | BODY MASS INDEX: 31.76 KG/M2 | SYSTOLIC BLOOD PRESSURE: 124 MMHG | WEIGHT: 186 LBS | HEIGHT: 64 IN | RESPIRATION RATE: 16 BRPM | OXYGEN SATURATION: 94 % | HEART RATE: 86 BPM | TEMPERATURE: 98.1 F

## 2023-11-09 DIAGNOSIS — E06.3 HASHIMOTO'S THYROIDITIS: ICD-10-CM

## 2023-11-09 DIAGNOSIS — E78.5 DYSLIPIDEMIA: ICD-10-CM

## 2023-11-09 PROBLEM — J01.40 SUBACUTE PANSINUSITIS: Status: RESOLVED | Noted: 2023-10-16 | Resolved: 2023-11-09

## 2023-11-09 PROCEDURE — 3079F DIAST BP 80-89 MM HG: CPT | Performed by: NURSE PRACTITIONER

## 2023-11-09 PROCEDURE — 3074F SYST BP LT 130 MM HG: CPT | Performed by: NURSE PRACTITIONER

## 2023-11-09 PROCEDURE — 99214 OFFICE O/P EST MOD 30 MIN: CPT | Performed by: NURSE PRACTITIONER

## 2023-11-09 RX ORDER — LEVOTHYROXINE SODIUM 0.03 MG/1
25 TABLET ORAL
Qty: 90 TABLET | Refills: 1 | Status: SHIPPED
Start: 2023-11-09 | End: 2024-01-03

## 2023-11-09 ASSESSMENT — FIBROSIS 4 INDEX: FIB4 SCORE: 0.96

## 2023-11-09 NOTE — ASSESSMENT & PLAN NOTE
The 10-year ASCVD risk score (Evangelista BOCANEGRA, et al., 2019) is: 10.5%    Chronic unstable. Patients lipids are elevated. Discussed risk score. Patient at this time would like to focus on changing her diet to help with her cholesterol. Discussed benefits of dietary changes and exercise in helping with her cholesterol.     Will recheck cholesterol in 6 months.

## 2023-11-09 NOTE — PROGRESS NOTES
"  Chief Complaint   Patient presents with    Lab Results                                                                                                                                       HPI:   Bella presents today with the following.    Problem   Hashimoto's Thyroiditis   Dyslipidemia   Subacute Pansinusitis (Resolved)       Current Outpatient Medications   Medication Sig Dispense Refill    levothyroxine (SYNTHROID) 25 MCG Tab Take 1 Tablet by mouth every morning on an empty stomach. 90 Tablet 1    albuterol 108 (90 Base) MCG/ACT Aero Soln inhalation aerosol Inhale 2 Puffs every 6 hours as needed for Shortness of Breath. 8.5 g 0     No current facility-administered medications for this visit.       Allergies as of 11/09/2023 - Reviewed 11/09/2023   Allergen Reaction Noted    Codeine Anaphylaxis 08/06/2014    Eggs Diarrhea and Vomiting 08/06/2014    Latex Rash 01/13/2016    Pcn [penicillins] Shortness of Breath 08/06/2014    Tape  02/01/2019        ROS:  All systems negative expect as addressed in assessment and plan.     /84 (BP Location: Left arm, Patient Position: Sitting)   Pulse 86   Temp 36.7 °C (98.1 °F) (Temporal)   Resp 16   Ht 1.626 m (5' 4\")   Wt 84.4 kg (186 lb) Comment: Patient stated. Decline to be weighed  SpO2 94%   BMI 31.93 kg/m²       Physical Exam  Vitals reviewed.   Constitutional:       Appearance: Normal appearance.   HENT:      Head: Normocephalic and atraumatic.      Mouth/Throat:      Mouth: Mucous membranes are moist.   Eyes:      Extraocular Movements: Extraocular movements intact.      Conjunctiva/sclera: Conjunctivae normal.   Pulmonary:      Effort: Pulmonary effort is normal.   Musculoskeletal:         General: Normal range of motion.      Cervical back: Normal range of motion.   Skin:     General: Skin is warm and dry.   Neurological:      General: No focal deficit present.      Mental Status: She is alert and oriented to person, place, and time.   Psychiatric:    "      Mood and Affect: Mood normal.         Behavior: Behavior normal.         Thought Content: Thought content normal.           Assessment and Plan:  73 y.o. female with the following issues.    1. Dyslipidemia  Lipid Profile      2. Hashimoto's thyroiditis  levothyroxine (SYNTHROID) 25 MCG Tab           Dyslipidemia  The 10-year ASCVD risk score (Evangelista BOCANEGRA, et al., 2019) is: 10.5%    Chronic unstable. Patients lipids are elevated. Discussed risk score. Patient at this time would like to focus on changing her diet to help with her cholesterol. Discussed benefits of dietary changes and exercise in helping with her cholesterol.     Will recheck cholesterol in 6 months.       Hashimoto's thyroiditis  Chronic stable.     Patient's thyroid labs are stable. She does report symptoms of dry skin, some fatigue, and cold intolerance.     Will start low dose of levothyroxine to see if this will help with her symptoms.       Return in about 6 months (around 5/9/2024) for High cholesterol.      Please note that this dictation was created using voice recognition software. I have worked with consultants from the vendor as well as technical experts from [employer] to optimize the interface. I have made every reasonable attempt to correct obvious errors, but I expect that there are errors of grammar and possibly content that I did not discover before finalizing the note.

## 2023-11-09 NOTE — ASSESSMENT & PLAN NOTE
Chronic stable.     Patient's thyroid labs are stable. She does report symptoms of dry skin, some fatigue, and cold intolerance.     Will start low dose of levothyroxine to see if this will help with her symptoms.

## 2024-01-03 ENCOUNTER — OFFICE VISIT (OUTPATIENT)
Dept: MEDICAL GROUP | Facility: PHYSICIAN GROUP | Age: 74
End: 2024-01-03
Payer: MEDICARE

## 2024-01-03 VITALS
WEIGHT: 191 LBS | SYSTOLIC BLOOD PRESSURE: 122 MMHG | DIASTOLIC BLOOD PRESSURE: 80 MMHG | HEART RATE: 115 BPM | BODY MASS INDEX: 32.61 KG/M2 | HEIGHT: 64 IN | RESPIRATION RATE: 16 BRPM | OXYGEN SATURATION: 98 % | TEMPERATURE: 98.3 F

## 2024-01-03 DIAGNOSIS — Z02.89 ENCOUNTER FOR COMPLETION OF FORM WITH PATIENT: ICD-10-CM

## 2024-01-03 DIAGNOSIS — E78.5 DYSLIPIDEMIA: ICD-10-CM

## 2024-01-03 DIAGNOSIS — E06.3 HASHIMOTO'S THYROIDITIS: ICD-10-CM

## 2024-01-03 PROCEDURE — 99214 OFFICE O/P EST MOD 30 MIN: CPT | Performed by: NURSE PRACTITIONER

## 2024-01-03 PROCEDURE — 3079F DIAST BP 80-89 MM HG: CPT | Performed by: NURSE PRACTITIONER

## 2024-01-03 PROCEDURE — 3074F SYST BP LT 130 MM HG: CPT | Performed by: NURSE PRACTITIONER

## 2024-01-03 RX ORDER — ZINC SULFATE 50(220)MG
220 CAPSULE ORAL DAILY
COMMUNITY

## 2024-01-03 ASSESSMENT — PATIENT HEALTH QUESTIONNAIRE - PHQ9: CLINICAL INTERPRETATION OF PHQ2 SCORE: 0

## 2024-01-03 ASSESSMENT — FIBROSIS 4 INDEX: FIB4 SCORE: 0.96

## 2024-01-03 NOTE — PROGRESS NOTES
"  Chief Complaint   Patient presents with    Paperwork     DMV                                                                                                                                       HPI:   Bella presents today with the following.    Problem   Hashimoto's Thyroiditis   Encounter for Completion of Form With Patient   Dyslipidemia   Benign Essential Htn (Resolved)       Current Outpatient Medications   Medication Sig Dispense Refill    ASHWAGANDHA PO Take 1,200 mg by mouth every day.      zinc sulfate (ZINCATE) 220 (50 Zn) MG Cap Take 220 mg by mouth every day.      Selenium 100 MCG Cap Take 1 Capsule by mouth every day.      albuterol 108 (90 Base) MCG/ACT Aero Soln inhalation aerosol Inhale 2 Puffs every 6 hours as needed for Shortness of Breath. 8.5 g 0     No current facility-administered medications for this visit.       Allergies as of 01/03/2024 - Reviewed 01/03/2024   Allergen Reaction Noted    Codeine Anaphylaxis 08/06/2014    Eggs Diarrhea and Vomiting 08/06/2014    Latex Rash 01/13/2016    Pcn [penicillins] Shortness of Breath 08/06/2014    Tape  02/01/2019        ROS:  All systems negative expect as addressed in assessment and plan.     /80 (BP Location: Left arm, Patient Position: Sitting)   Pulse (!) 115   Temp 36.8 °C (98.3 °F) (Temporal)   Resp 16   Ht 1.626 m (5' 4\")   Wt 86.6 kg (191 lb)   SpO2 98%   BMI 32.79 kg/m²       Physical Exam  Vitals reviewed.   Constitutional:       Appearance: Normal appearance.   HENT:      Head: Normocephalic and atraumatic.      Mouth/Throat:      Mouth: Mucous membranes are moist.   Eyes:      Extraocular Movements: Extraocular movements intact.      Conjunctiva/sclera: Conjunctivae normal.   Pulmonary:      Effort: Pulmonary effort is normal.   Musculoskeletal:         General: Normal range of motion.      Cervical back: Normal range of motion.   Skin:     General: Skin is warm and dry.   Neurological:      General: No focal deficit " present.      Mental Status: She is alert and oriented to person, place, and time.   Psychiatric:         Mood and Affect: Mood normal.         Behavior: Behavior normal.         Thought Content: Thought content normal.         Assessment and Plan:  73 y.o. female with the following issues.    1. Encounter for completion of form with patient        2. Hashimoto's thyroiditis  TSH    FREE THYROXINE    TRIIDOTHYRONINE      3. Dyslipidemia             Encounter for completion of form with patient  Patient is here for her DMV evaluation form. She has already seen her eye doctor.     Will sign form, scan into chart, and provide original back to patient.     Hashimoto's thyroiditis  Chronic stable.     Patient states she is no longer taking the levothyroxine. She is taking ashwaganda and has changed her diet to help with her Hashimoto's.     Will check thyroid function.     Dyslipidemia  Chronic unstable.     Patient is modifying her diet to help with her cholesterol level. She has stopped eating lower card high fat to help reduce her dietary cholesterol intake.     Continue dietary modifications. Will check lipid panel at least yearly.       Return for as needed or yearly.      Please note that this dictation was created using voice recognition software. I have worked with consultants from the vendor as well as technical experts from [facility] [employer] to optimize the interface. I have made every reasonable attempt to correct obvious errors, but I expect that there are errors of grammar and possibly content that I did not discover before finalizing the note.

## 2024-01-03 NOTE — ASSESSMENT & PLAN NOTE
Chronic stable.     Patient states she is no longer taking the levothyroxine. She is taking ashwaganda and has changed her diet to help with her Hashimoto's.     Will check thyroid function.

## 2024-01-03 NOTE — ASSESSMENT & PLAN NOTE
Chronic unstable.     Patient is modifying her diet to help with her cholesterol level. She has stopped eating lower card high fat to help reduce her dietary cholesterol intake.     Continue dietary modifications. Will check lipid panel at least yearly.

## 2024-01-03 NOTE — ASSESSMENT & PLAN NOTE
Patient is here for her DMV evaluation form. She has already seen her eye doctor.     Will sign form, scan into chart, and provide original back to patient.

## 2024-03-14 ENCOUNTER — OFFICE VISIT (OUTPATIENT)
Dept: MEDICAL GROUP | Facility: PHYSICIAN GROUP | Age: 74
End: 2024-03-14
Payer: MEDICARE

## 2024-03-14 VITALS
SYSTOLIC BLOOD PRESSURE: 118 MMHG | DIASTOLIC BLOOD PRESSURE: 80 MMHG | HEIGHT: 64 IN | WEIGHT: 209 LBS | TEMPERATURE: 98.1 F | RESPIRATION RATE: 18 BRPM | BODY MASS INDEX: 35.68 KG/M2 | HEART RATE: 77 BPM | OXYGEN SATURATION: 98 %

## 2024-03-14 DIAGNOSIS — H92.09 OTALGIA, UNSPECIFIED LATERALITY: ICD-10-CM

## 2024-03-14 DIAGNOSIS — B34.9 ACUTE VIRAL SYNDROME: ICD-10-CM

## 2024-03-14 DIAGNOSIS — R42 VERTIGO: ICD-10-CM

## 2024-03-14 DIAGNOSIS — E06.3 HASHIMOTO'S THYROIDITIS: ICD-10-CM

## 2024-03-14 PROCEDURE — 99214 OFFICE O/P EST MOD 30 MIN: CPT | Performed by: NURSE PRACTITIONER

## 2024-03-14 PROCEDURE — 3079F DIAST BP 80-89 MM HG: CPT | Performed by: NURSE PRACTITIONER

## 2024-03-14 PROCEDURE — 3074F SYST BP LT 130 MM HG: CPT | Performed by: NURSE PRACTITIONER

## 2024-03-14 RX ORDER — MECLIZINE HCL 12.5 MG/1
12.5-25 TABLET ORAL 3 TIMES DAILY PRN
Qty: 90 TABLET | Refills: 1 | Status: SHIPPED | OUTPATIENT
Start: 2024-03-14

## 2024-03-14 RX ORDER — ONDANSETRON 4 MG/1
4 TABLET, ORALLY DISINTEGRATING ORAL EVERY 6 HOURS PRN
Qty: 10 TABLET | Refills: 0 | Status: SHIPPED | OUTPATIENT
Start: 2024-03-14

## 2024-03-14 RX ORDER — METHYLPREDNISOLONE 4 MG/1
TABLET ORAL
Qty: 21 TABLET | Refills: 0 | Status: SHIPPED | OUTPATIENT
Start: 2024-03-14

## 2024-03-14 ASSESSMENT — FIBROSIS 4 INDEX: FIB4 SCORE: 0.97

## 2024-03-14 NOTE — ASSESSMENT & PLAN NOTE
Chronic unstable.     Patient reports developing vertigo with a viral illness. She also had nausea and vomiting.     Will provide patient with as needed meclizine and zofran.

## 2024-03-14 NOTE — ASSESSMENT & PLAN NOTE
Chronic stable.     Patient has been taking ashwaganda and working on anti inflammatory diet to help with her hashimotos. She is hesitant to start medication.     Patient to recheck labs. Discussed using armour thyroid instead of levothyroxine if thyroid labs are abnormal.

## 2024-03-14 NOTE — ASSESSMENT & PLAN NOTE
Patient developed vertigo, nausea, vomiting, and diarrhea. She states that the diarrhea and vomiting resolved. She continues to have vertigo and nausea. She has been having some nasal drainage as well as headache. Denies fever, she did have chills the first few days. Yesterday she woke up with ear pressure and right ear pain. She also reports soreness of her neck and a cough due to nasal drainage.     Will provide patient with methylprednisolone dose pack to help decrease inflammation.

## 2024-03-14 NOTE — PROGRESS NOTES
"  Chief Complaint   Patient presents with    Otalgia     Right ear x 1 day    Vertigo     Nausea and vomiting X 4 days                                                                                                                                        HPI:   Bella presents today with the following.    Problem   Acute Viral Syndrome   Hashimoto's Thyroiditis   Vertigo       Current Outpatient Medications   Medication Sig Dispense Refill    meclizine (ANTIVERT) 12.5 MG Tab Take 1-2 Tablets by mouth 3 times a day as needed for Nausea/Vomiting or Dizziness. 90 Tablet 1    ondansetron (ZOFRAN ODT) 4 MG TABLET DISPERSIBLE Take 1 Tablet by mouth every 6 hours as needed for Nausea/Vomiting. 10 Tablet 0    methylPREDNISolone (MEDROL DOSEPAK) 4 MG Tablet Therapy Pack As directed on the packaging label. 21 Tablet 0    ASHWAGANDHA PO Take 1,200 mg by mouth every day.      zinc sulfate (ZINCATE) 220 (50 Zn) MG Cap Take 220 mg by mouth every day.      Selenium 100 MCG Cap Take 1 Capsule by mouth every day.      albuterol 108 (90 Base) MCG/ACT Aero Soln inhalation aerosol Inhale 2 Puffs every 6 hours as needed for Shortness of Breath. 8.5 g 0     No current facility-administered medications for this visit.       Allergies as of 03/14/2024 - Reviewed 03/14/2024   Allergen Reaction Noted    Codeine Anaphylaxis 08/06/2014    Eggs Diarrhea and Vomiting 08/06/2014    Latex Rash 01/13/2016    Pcn [penicillins] Shortness of Breath 08/06/2014    Tape  02/01/2019        ROS:  All systems negative expect as addressed in assessment and plan.     /80 (BP Location: Left arm, Patient Position: Sitting)   Pulse 77   Temp 36.7 °C (98.1 °F) (Temporal)   Resp 18   Ht 1.626 m (5' 4\")   Wt 94.8 kg (209 lb)   SpO2 98%   BMI 35.87 kg/m²       Physical Exam  Vitals reviewed.   Constitutional:       Appearance: Normal appearance.   HENT:      Head: Normocephalic and atraumatic.      Right Ear: External ear normal. A middle ear effusion is " present.      Left Ear: External ear normal. A middle ear effusion is present. Tympanic membrane is injected.      Nose: Congestion and rhinorrhea present.      Right Turbinates: Enlarged and swollen.      Left Turbinates: Swollen.      Right Sinus: Maxillary sinus tenderness and frontal sinus tenderness present.      Left Sinus: Maxillary sinus tenderness and frontal sinus tenderness present.      Mouth/Throat:      Mouth: Mucous membranes are moist.   Eyes:      Extraocular Movements: Extraocular movements intact.      Conjunctiva/sclera: Conjunctivae normal.   Pulmonary:      Effort: Pulmonary effort is normal.   Musculoskeletal:         General: Normal range of motion.      Cervical back: Normal range of motion.   Skin:     General: Skin is warm and dry.   Neurological:      General: No focal deficit present.      Mental Status: She is alert and oriented to person, place, and time.   Psychiatric:         Mood and Affect: Mood normal.         Behavior: Behavior normal.         Thought Content: Thought content normal.         Assessment and Plan:  74 y.o. female with the following issues.    1. Acute viral syndrome  methylPREDNISolone (MEDROL DOSEPAK) 4 MG Tablet Therapy Pack      2. Vertigo  meclizine (ANTIVERT) 12.5 MG Tab    ondansetron (ZOFRAN ODT) 4 MG TABLET DISPERSIBLE    methylPREDNISolone (MEDROL DOSEPAK) 4 MG Tablet Therapy Pack      3. Otalgia, unspecified laterality  meclizine (ANTIVERT) 12.5 MG Tab    ondansetron (ZOFRAN ODT) 4 MG TABLET DISPERSIBLE    methylPREDNISolone (MEDROL DOSEPAK) 4 MG Tablet Therapy Pack      4. Hashimoto's thyroiditis             Acute viral syndrome  Patient developed vertigo, nausea, vomiting, and diarrhea. She states that the diarrhea and vomiting resolved. She continues to have vertigo and nausea. She has been having some nasal drainage as well as headache. Denies fever, she did have chills the first few days. Yesterday she woke up with ear pressure and right ear pain.  She also reports soreness of her neck and a cough due to nasal drainage.     Will provide patient with methylprednisolone dose pack to help decrease inflammation.             Vertigo  Chronic unstable.     Patient reports developing vertigo with a viral illness. She also had nausea and vomiting.     Will provide patient with as needed meclizine and zofran.       Hashimoto's thyroiditis  Chronic stable.     Patient has been taking ashwaganda and working on anti inflammatory diet to help with her hashimotos. She is hesitant to start medication.     Patient to recheck labs. Discussed using armour thyroid instead of levothyroxine if thyroid labs are abnormal.       Return if symptoms worsen or fail to improve.    HCC Gap Form    Comorbidity Diagnosis: Dyslipidemia  Assessment and plan: Chronic, stable. Encouraged healthy diet and physical activity changes with a goal of weight loss. Follow up at least annually. The comorbid condition is stable based on today's assessment. Continue current treatment plan including control of risk factors. Follow up at least yearly.  Last edited 03/14/24 09:40 PDT by Kandis Kinney, A.P.R.N.         Please note that this dictation was created using voice recognition software. I have worked with consultants from the vendor as well as technical experts from [facility] to optimize the interface. I have made every reasonable attempt to correct obvious errors, but I expect that there are errors of grammar and possibly content that I did not discover before finalizing the note.

## 2024-03-26 ENCOUNTER — OFFICE VISIT (OUTPATIENT)
Dept: URGENT CARE | Facility: PHYSICIAN GROUP | Age: 74
End: 2024-03-26
Payer: MEDICARE

## 2024-03-26 VITALS
HEART RATE: 65 BPM | WEIGHT: 200 LBS | DIASTOLIC BLOOD PRESSURE: 74 MMHG | BODY MASS INDEX: 34.15 KG/M2 | TEMPERATURE: 97.6 F | RESPIRATION RATE: 20 BRPM | HEIGHT: 64 IN | SYSTOLIC BLOOD PRESSURE: 122 MMHG | OXYGEN SATURATION: 98 %

## 2024-03-26 DIAGNOSIS — B37.9 ANTIBIOTIC-INDUCED YEAST INFECTION: ICD-10-CM

## 2024-03-26 DIAGNOSIS — H66.91 ACUTE BACTERIAL OTITIS MEDIA, RIGHT: ICD-10-CM

## 2024-03-26 DIAGNOSIS — T36.95XA ANTIBIOTIC-INDUCED YEAST INFECTION: ICD-10-CM

## 2024-03-26 DIAGNOSIS — H92.03 OTALGIA, BILATERAL: ICD-10-CM

## 2024-03-26 DIAGNOSIS — R53.83 OTHER FATIGUE: ICD-10-CM

## 2024-03-26 PROCEDURE — 3074F SYST BP LT 130 MM HG: CPT | Performed by: NURSE PRACTITIONER

## 2024-03-26 PROCEDURE — 99214 OFFICE O/P EST MOD 30 MIN: CPT | Performed by: NURSE PRACTITIONER

## 2024-03-26 PROCEDURE — 3078F DIAST BP <80 MM HG: CPT | Performed by: NURSE PRACTITIONER

## 2024-03-26 RX ORDER — FLUCONAZOLE 150 MG/1
150 TABLET ORAL DAILY
Qty: 1 TABLET | Refills: 0 | Status: SHIPPED | OUTPATIENT
Start: 2024-03-26 | End: 2024-03-27

## 2024-03-26 RX ORDER — DOXYCYCLINE HYCLATE 100 MG
100 TABLET ORAL 2 TIMES DAILY
Qty: 14 TABLET | Refills: 0 | Status: SHIPPED | OUTPATIENT
Start: 2024-03-26 | End: 2024-04-02

## 2024-03-26 ASSESSMENT — FIBROSIS 4 INDEX: FIB4 SCORE: 0.97

## 2024-03-26 NOTE — PROGRESS NOTES
Subjective:   Bella Navarro is a 74 y.o. female who presents for Otalgia (Ear pain)       HPI  Patient is a pleasant 74 y.o. who presents for evaluation of an approximate 1.5-week history of bilateral otalgia, sore throat, nasal congestion, and fatigue.  Patient states she has been exposed to several people who have been ill with unknown diagnoses.  Recently saw her primary care provider, who prescribed a Medrol Dosepak, which she took as prescribed, however did not notice any difference.  Denies any aggravating or alleviating factors.    ROS  All other systems are negative except as documented above within HPI.    MEDS:   Current Outpatient Medications:     meclizine (ANTIVERT) 12.5 MG Tab, Take 1-2 Tablets by mouth 3 times a day as needed for Nausea/Vomiting or Dizziness. (Patient not taking: Reported on 3/26/2024), Disp: 90 Tablet, Rfl: 1    ondansetron (ZOFRAN ODT) 4 MG TABLET DISPERSIBLE, Take 1 Tablet by mouth every 6 hours as needed for Nausea/Vomiting. (Patient not taking: Reported on 3/26/2024), Disp: 10 Tablet, Rfl: 0    methylPREDNISolone (MEDROL DOSEPAK) 4 MG Tablet Therapy Pack, As directed on the packaging label. (Patient not taking: Reported on 3/26/2024), Disp: 21 Tablet, Rfl: 0    ASHWAGANDHA PO, Take 1,200 mg by mouth every day. (Patient not taking: Reported on 3/26/2024), Disp: , Rfl:     zinc sulfate (ZINCATE) 220 (50 Zn) MG Cap, Take 220 mg by mouth every day. (Patient not taking: Reported on 3/26/2024), Disp: , Rfl:     Selenium 100 MCG Cap, Take 1 Capsule by mouth every day. (Patient not taking: Reported on 3/26/2024), Disp: , Rfl:     albuterol 108 (90 Base) MCG/ACT Aero Soln inhalation aerosol, Inhale 2 Puffs every 6 hours as needed for Shortness of Breath. (Patient not taking: Reported on 3/26/2024), Disp: 8.5 g, Rfl: 0  ALLERGIES:   Allergies   Allergen Reactions    Codeine Anaphylaxis    Eggs Diarrhea and Vomiting     GI distress.    Latex Rash     .Latex tape mostly    Pcn  "[Penicillins] Shortness of Breath    Tape        Patient's PMH, SocHx, SurgHx, FamHx, Drug allergies and medications were reviewed.     Objective:   /74 (BP Location: Left arm, Patient Position: Sitting, BP Cuff Size: Adult long)   Pulse 65   Temp 36.4 °C (97.6 °F) (Temporal)   Resp 20   Ht 1.626 m (5' 4\")   Wt 90.7 kg (200 lb) Comment: per patient  SpO2 98%   BMI 34.33 kg/m²     Physical Exam  Vitals and nursing note reviewed.   Constitutional:       General: She is awake.      Appearance: Normal appearance. She is well-developed.      Comments: Appears tired   HENT:      Head: Normocephalic and atraumatic.      Right Ear: External ear normal. Tympanic membrane is erythematous and bulging.      Left Ear: External ear normal.      Nose: Nose normal.      Mouth/Throat:      Mouth: Mucous membranes are moist.      Pharynx: Oropharynx is clear.   Eyes:      Extraocular Movements: Extraocular movements intact.      Conjunctiva/sclera: Conjunctivae normal.   Cardiovascular:      Rate and Rhythm: Normal rate and regular rhythm.   Pulmonary:      Effort: Pulmonary effort is normal.      Breath sounds: Normal breath sounds.   Musculoskeletal:         General: Normal range of motion.      Cervical back: Normal range of motion and neck supple.   Skin:     General: Skin is warm and dry.   Neurological:      Mental Status: She is alert and oriented to person, place, and time.   Psychiatric:         Mood and Affect: Mood normal.         Behavior: Behavior normal.         Thought Content: Thought content normal.         Assessment/Plan:   Assessment    1. Acute bacterial otitis media, right  - doxycycline (VIBRAMYCIN) 100 MG Tab; Take 1 Tablet by mouth 2 times a day for 7 days.  Dispense: 14 Tablet; Refill: 0    2. Antibiotic-induced yeast infection  - fluconazole (DIFLUCAN) 150 MG tablet; Take 1 Tablet by mouth every day for 1 dose.  Dispense: 1 Tablet; Refill: 0    3. Otalgia, bilateral    4. Other " fatigue      Vital signs stable at today's acute urgent care visit.  Begin medications as listed. Recommend continue over-the-counter antihistamine and decongestant for symptomatic relief.    Advised the patient to follow-up with the primary care provider if symptoms persist.  Red flags discussed and indications to immediately call 911 or present to the ED. All questions were encouraged and answered to the patient's satisfaction and understanding, and they agree to the plan of care.     This is an acute problem with uncertain prognosis, medication management and instructions as well as management options were provided.  I personally reviewed prior external notes and test results pertinent to today and independently reviewed and interpreted all diagnostics, to include POCT testing. Time spent evaluating this patient includes preparing for visit, counseling/education, exam, evaluation, obtaining history, and ordering lab/test/procedures.      Please note that this dictation was created using voice recognition software. I have made a reasonable attempt to correct obvious errors, but I expect that there are errors of grammar and possibly content that I did not discover before finalizing the note.

## 2024-04-08 ENCOUNTER — OFFICE VISIT (OUTPATIENT)
Dept: MEDICAL GROUP | Facility: PHYSICIAN GROUP | Age: 74
End: 2024-04-08
Payer: MEDICARE

## 2024-04-08 VITALS
RESPIRATION RATE: 16 BRPM | HEART RATE: 92 BPM | TEMPERATURE: 97.7 F | BODY MASS INDEX: 36.43 KG/M2 | OXYGEN SATURATION: 94 % | DIASTOLIC BLOOD PRESSURE: 82 MMHG | HEIGHT: 64 IN | WEIGHT: 213.4 LBS | SYSTOLIC BLOOD PRESSURE: 140 MMHG

## 2024-04-08 DIAGNOSIS — B37.9 ANTIBIOTIC-INDUCED YEAST INFECTION: ICD-10-CM

## 2024-04-08 DIAGNOSIS — M54.30 SCIATIC LEG PAIN: ICD-10-CM

## 2024-04-08 DIAGNOSIS — H92.09 OTALGIA, UNSPECIFIED LATERALITY: ICD-10-CM

## 2024-04-08 DIAGNOSIS — H66.90 ACUTE OTITIS MEDIA, UNSPECIFIED OTITIS MEDIA TYPE: ICD-10-CM

## 2024-04-08 DIAGNOSIS — R42 VERTIGO: ICD-10-CM

## 2024-04-08 DIAGNOSIS — T36.95XA ANTIBIOTIC-INDUCED YEAST INFECTION: ICD-10-CM

## 2024-04-08 PROCEDURE — 3079F DIAST BP 80-89 MM HG: CPT | Performed by: NURSE PRACTITIONER

## 2024-04-08 PROCEDURE — 3077F SYST BP >= 140 MM HG: CPT | Performed by: NURSE PRACTITIONER

## 2024-04-08 PROCEDURE — 99214 OFFICE O/P EST MOD 30 MIN: CPT | Performed by: NURSE PRACTITIONER

## 2024-04-08 RX ORDER — CEFDINIR 300 MG/1
600 CAPSULE ORAL DAILY
Qty: 20 CAPSULE | Refills: 0 | Status: SHIPPED | OUTPATIENT
Start: 2024-04-08 | End: 2024-04-18

## 2024-04-08 RX ORDER — CYCLOBENZAPRINE HCL 5 MG
2.5-1 TABLET ORAL 3 TIMES DAILY PRN
Qty: 30 TABLET | Refills: 0 | Status: SHIPPED | OUTPATIENT
Start: 2024-04-08

## 2024-04-08 RX ORDER — FLUCONAZOLE 150 MG/1
150 TABLET ORAL DAILY
Qty: 1 TABLET | Refills: 0 | Status: SHIPPED | OUTPATIENT
Start: 2024-04-08

## 2024-04-08 ASSESSMENT — FIBROSIS 4 INDEX: FIB4 SCORE: 0.97

## 2024-04-08 NOTE — PROGRESS NOTES
Family Nurse Practitioner Student Note    Cosigner/Preceptor: CESAR Peterson    Chief Complaint:                                                                                                                                   Chief Complaint   Patient presents with    Earache     Bilateral ear pain, sharp pain, dizziness, x1 month        HPI:   Bella presents today with the following.    Current Outpatient Medications   Medication Sig Dispense Refill    cefdinir (OMNICEF) 300 MG Cap Take 2 Capsules by mouth every day for 10 days. 20 Capsule 0    fluconazole (DIFLUCAN) 150 MG tablet Take 1 Tablet by mouth every day. 1 Tablet 0    cyclobenzaprine (FLEXERIL) 5 mg tablet Take 0.5-2 Tablets by mouth 3 times a day as needed for Moderate Pain or Muscle Spasms (Sciatic Pain). 30 Tablet 0    meclizine (ANTIVERT) 12.5 MG Tab Take 1-2 Tablets by mouth 3 times a day as needed for Nausea/Vomiting or Dizziness. 90 Tablet 1    ondansetron (ZOFRAN ODT) 4 MG TABLET DISPERSIBLE Take 1 Tablet by mouth every 6 hours as needed for Nausea/Vomiting. 10 Tablet 0    methylPREDNISolone (MEDROL DOSEPAK) 4 MG Tablet Therapy Pack As directed on the packaging label. 21 Tablet 0    ASHWAGANDHA PO Take 1,200 mg by mouth every day.      zinc sulfate (ZINCATE) 220 (50 Zn) MG Cap Take 220 mg by mouth every day.      Selenium 100 MCG Cap Take 1 Capsule by mouth every day.      albuterol 108 (90 Base) MCG/ACT Aero Soln inhalation aerosol Inhale 2 Puffs every 6 hours as needed for Shortness of Breath. 8.5 g 0     No current facility-administered medications for this visit.       Allergies as of 04/08/2024 - Reviewed 04/08/2024   Allergen Reaction Noted    Codeine Anaphylaxis 08/06/2014    Eggs Diarrhea and Vomiting 08/06/2014    Latex Rash 01/13/2016    Pcn [penicillins] Shortness of Breath 08/06/2014    Tape  02/01/2019        ROS:  All systems negative expect as addressed in assessment and plan.     Assessment:  BP (!) 140/82 (BP Location:  "Right arm, Patient Position: Sitting, BP Cuff Size: Adult)   Pulse 92   Temp 36.5 °C (97.7 °F) (Temporal)   Resp 16   Ht 1.626 m (5' 4\")   Wt 96.8 kg (213 lb 6.4 oz)   SpO2 94%   BMI 36.63 kg/m²     Physical Exam  Constitutional:       Appearance: Normal appearance. She is normal weight.   HENT:      Right Ear: Decreased hearing noted. A middle ear effusion is present. Tympanic membrane is erythematous and bulging.      Left Ear: Decreased hearing noted. A middle ear effusion is present. Tympanic membrane is erythematous and bulging.   Pulmonary:      Effort: Pulmonary effort is normal.   Musculoskeletal:         General: Normal range of motion.      Cervical back: Normal range of motion.   Neurological:      Mental Status: She is alert and oriented to person, place, and time.   Psychiatric:         Mood and Affect: Mood normal.         Behavior: Behavior normal.         Thought Content: Thought content normal.         Judgment: Judgment normal.         Plan:  74 y.o. female with the following issues.    1. Vertigo      Acute Unstable    Related to acute otitis media      2. Otalgia, unspecified laterality  cefdinir (OMNICEF) 300 MG Cap    Acute Unstable    Associated with acute otitis media      3. Acute otitis media, unspecified otitis media type  cefdinir (OMNICEF) 300 MG Cap    Acute Unstable    Patient presented in the begining of March for ear pain. Acute viral syndrome diagnosed at that time. Patient seen about 2 weeks ago for continued pain. Was prescribed Doxycyline at that time. Mild improvement at this time.     Prescribed 600 MG daily for 10 days to take with food.       4. Antibiotic-induced yeast infection  fluconazole (DIFLUCAN) 150 MG tablet    Patient has a history of antibiotic-induced yeast infections. Will prescirbe fluconazole if needed for patient.    Fluconazole 150 MG once      5. Sciatic leg pain  cyclobenzaprine (FLEXERIL) 5 mg tablet    Chronic Unstable    Patient usually manages " her sciatic pain with exercise. Due to her decreased mobility related to dizziness she has had a flair. Will prescribe cyclobenzaprine 2.5-10 MG TID PRN. Patient will primarily take this at night due to fatigue. She would like to start with as small as possible due to her medication sensitivity. She will try 2.5 MG nightly. Will increase as needed/tolerated to max dose of 10 MG.            Return in about 5 months (around 9/1/2024) for Annual Wellness.

## 2024-04-13 ENCOUNTER — PATIENT MESSAGE (OUTPATIENT)
Dept: MEDICAL GROUP | Facility: PHYSICIAN GROUP | Age: 74
End: 2024-04-13
Payer: MEDICARE

## 2024-04-13 DIAGNOSIS — H92.09 OTALGIA, UNSPECIFIED LATERALITY: ICD-10-CM

## 2024-04-13 DIAGNOSIS — J01.40 SUBACUTE PANSINUSITIS: ICD-10-CM

## 2024-04-13 DIAGNOSIS — R42 VERTIGO: ICD-10-CM

## 2024-05-07 ENCOUNTER — APPOINTMENT (OUTPATIENT)
Dept: MEDICAL GROUP | Facility: PHYSICIAN GROUP | Age: 74
End: 2024-05-07
Payer: MEDICARE

## 2024-05-22 ENCOUNTER — APPOINTMENT (OUTPATIENT)
Dept: MEDICAL GROUP | Facility: PHYSICIAN GROUP | Age: 74
End: 2024-05-22
Payer: MEDICARE

## 2024-06-03 ENCOUNTER — APPOINTMENT (OUTPATIENT)
Dept: MEDICAL GROUP | Facility: PHYSICIAN GROUP | Age: 74
End: 2024-06-03
Payer: MEDICARE

## 2024-06-03 VITALS
RESPIRATION RATE: 16 BRPM | BODY MASS INDEX: 35.68 KG/M2 | SYSTOLIC BLOOD PRESSURE: 128 MMHG | HEART RATE: 101 BPM | DIASTOLIC BLOOD PRESSURE: 80 MMHG | OXYGEN SATURATION: 96 % | WEIGHT: 209 LBS | TEMPERATURE: 98.3 F | HEIGHT: 64 IN

## 2024-06-03 DIAGNOSIS — J30.89 ENVIRONMENTAL AND SEASONAL ALLERGIES: ICD-10-CM

## 2024-06-03 DIAGNOSIS — H66.90 CHRONIC OTITIS MEDIA, UNSPECIFIED OTITIS MEDIA TYPE: ICD-10-CM

## 2024-06-03 PROCEDURE — 99214 OFFICE O/P EST MOD 30 MIN: CPT | Performed by: NURSE PRACTITIONER

## 2024-06-03 PROCEDURE — 3074F SYST BP LT 130 MM HG: CPT | Performed by: NURSE PRACTITIONER

## 2024-06-03 PROCEDURE — 3079F DIAST BP 80-89 MM HG: CPT | Performed by: NURSE PRACTITIONER

## 2024-06-03 RX ORDER — AZELASTINE 1 MG/ML
1 SPRAY, METERED NASAL 2 TIMES DAILY
Qty: 30 ML | Refills: 3 | Status: SHIPPED | OUTPATIENT
Start: 2024-06-03

## 2024-06-03 RX ORDER — FLUTICASONE PROPIONATE 50 MCG
1 SPRAY, SUSPENSION (ML) NASAL 2 TIMES DAILY
Qty: 18.2 ML | Refills: 3 | Status: SHIPPED | OUTPATIENT
Start: 2024-06-03

## 2024-06-03 ASSESSMENT — FIBROSIS 4 INDEX: FIB4 SCORE: 0.97

## 2024-06-03 NOTE — PROGRESS NOTES
Family Nurse Practitioner Student Note    Cosigner/Preceptor: CESAR Peterson    Chief Complaint:                                                                                                                                 Chief Complaint   Patient presents with    Otalgia     Both ears on going since last visit         HPI:   Bella presents today with the following.    Current Outpatient Medications   Medication Sig Dispense Refill    azelastine (ASTELIN) 137 MCG/SPRAY nasal spray Administer 1 Spray into affected nostril(S) 2 times a day. 30 mL 3    fluticasone (FLONASE) 50 MCG/ACT nasal spray Administer 1 Spray into affected nostril(S) 2 times a day. 18.2 mL 3    ASHWAGANDHA PO Take 1,200 mg by mouth every day.      zinc sulfate (ZINCATE) 220 (50 Zn) MG Cap Take 220 mg by mouth every day.      Selenium 100 MCG Cap Take 1 Capsule by mouth every day.      albuterol 108 (90 Base) MCG/ACT Aero Soln inhalation aerosol Inhale 2 Puffs every 6 hours as needed for Shortness of Breath. 8.5 g 0    fluconazole (DIFLUCAN) 150 MG tablet Take 1 Tablet by mouth every day. (Patient not taking: Reported on 6/3/2024) 1 Tablet 0    cyclobenzaprine (FLEXERIL) 5 mg tablet Take 0.5-2 Tablets by mouth 3 times a day as needed for Moderate Pain or Muscle Spasms (Sciatic Pain). (Patient not taking: Reported on 6/3/2024) 30 Tablet 0    meclizine (ANTIVERT) 12.5 MG Tab Take 1-2 Tablets by mouth 3 times a day as needed for Nausea/Vomiting or Dizziness. (Patient not taking: Reported on 6/3/2024) 90 Tablet 1     No current facility-administered medications for this visit.       Allergies as of 06/03/2024 - Reviewed 06/03/2024   Allergen Reaction Noted    Codeine Anaphylaxis 08/06/2014    Eggs Diarrhea and Vomiting 08/06/2014    Latex Rash 01/13/2016    Pcn [penicillins] Shortness of Breath 08/06/2014    Tape  02/01/2019        ROS:  All systems negative expect as addressed in assessment and plan.     Assessment:  /80 (BP  "Location: Left arm, Patient Position: Sitting, BP Cuff Size: Adult)   Pulse (!) 101   Temp 36.8 °C (98.3 °F) (Temporal)   Resp 16   Ht 1.626 m (5' 4\")   Wt 94.8 kg (209 lb)   SpO2 96%   BMI 35.87 kg/m²     Physical Exam  Constitutional:       Appearance: Normal appearance.   HENT:      Head: Normocephalic.      Right Ear: A middle ear effusion is present. There is impacted cerumen.      Left Ear: A middle ear effusion is present.      Nose:      Right Sinus: Frontal sinus tenderness present. No maxillary sinus tenderness.      Left Sinus: Frontal sinus tenderness present. No maxillary sinus tenderness.   Cardiovascular:      Rate and Rhythm: Normal rate.   Pulmonary:      Effort: Pulmonary effort is normal.   Musculoskeletal:         General: Normal range of motion.      Cervical back: Normal range of motion.   Skin:     General: Skin is warm and dry.   Neurological:      Mental Status: She is alert and oriented to person, place, and time.   Psychiatric:         Mood and Affect: Mood normal.         Behavior: Behavior normal.         Thought Content: Thought content normal.         Judgment: Judgment normal.       Plan:  74 y.o. female with the following issues.    1. Chronic otitis media, unspecified otitis media type  azelastine (ASTELIN) 137 MCG/SPRAY nasal spray    fluticasone (FLONASE) 50 MCG/ACT nasal spray    Referral to Allergy    Chronic unstable    Patient presents with continued ear pain due to chornic effusion of the middle ear. Patient has been referred to ENT, but they cannot get her in until October. Advised patient to follow up with other ENT offices in town and we can forward the referral to an office if they can get her in sooner. Patient verbalizes understanding. We will also start patient on azelastine and fluticasone spray to help reduce allergies and sinus inflammation to help facilitate drainage of fluid. Patient referred to allergy as well to help reduce effusion      2. Environmental " and seasonal allergies  Referral to Allergy    Chornic unstable    Referral placed to allergy.           Return in about 3 months (around 9/3/2024) for ear pain and allergies.

## 2024-11-26 ENCOUNTER — OFFICE VISIT (OUTPATIENT)
Dept: MEDICAL GROUP | Facility: PHYSICIAN GROUP | Age: 74
End: 2024-11-26
Payer: MEDICARE

## 2024-11-26 VITALS
HEART RATE: 84 BPM | OXYGEN SATURATION: 97 % | TEMPERATURE: 97.7 F | WEIGHT: 213 LBS | HEIGHT: 64 IN | SYSTOLIC BLOOD PRESSURE: 138 MMHG | RESPIRATION RATE: 18 BRPM | BODY MASS INDEX: 36.37 KG/M2 | DIASTOLIC BLOOD PRESSURE: 88 MMHG

## 2024-11-26 DIAGNOSIS — J01.40 ACUTE NON-RECURRENT PANSINUSITIS: ICD-10-CM

## 2024-11-26 DIAGNOSIS — Z78.0 POSTMENOPAUSAL: ICD-10-CM

## 2024-11-26 DIAGNOSIS — Z12.31 ENCOUNTER FOR SCREENING MAMMOGRAM FOR MALIGNANT NEOPLASM OF BREAST: ICD-10-CM

## 2024-11-26 PROCEDURE — 3080F DIAST BP >= 90 MM HG: CPT

## 2024-11-26 PROCEDURE — 99213 OFFICE O/P EST LOW 20 MIN: CPT

## 2024-11-26 PROCEDURE — 3077F SYST BP >= 140 MM HG: CPT

## 2024-11-26 RX ORDER — DOXYCYCLINE 100 MG/1
100 CAPSULE ORAL 2 TIMES DAILY
Qty: 10 CAPSULE | Refills: 0 | Status: SHIPPED | OUTPATIENT
Start: 2024-11-26 | End: 2024-12-01

## 2024-11-26 NOTE — PROGRESS NOTES
Chief Complaint   Patient presents with    Active Symptoms     Sore throat, right ear ache, diarrhea, dry cough. Had a negative at home covid test yesterday . Symptoms for 3 days    URI        HPI: Patient is a 74 y.o. female complaining of 3 days of illness including: diarrhea, ear pain, facial pain, sore throat.   Mucus is: thick.  Similarly ill exposures: yes. grandchildren  Treatments tried: bridger oil to ear, on guard, soup, rest  She  reports that she has never smoked. She has never used smokeless tobacco..     ROS:  No fever, cough, nausea, changes in bowel movements or skin rash.      I reviewed the patient's medications, allergies and medical history:  Current Outpatient Medications   Medication Sig Dispense Refill    doxycycline (MONODOX) 100 MG capsule Take 1 Capsule by mouth 2 times a day for 5 days. 10 Capsule 0    azelastine (ASTELIN) 137 MCG/SPRAY nasal spray Administer 1 Spray into affected nostril(S) 2 times a day. 30 mL 3    fluticasone (FLONASE) 50 MCG/ACT nasal spray Administer 1 Spray into affected nostril(S) 2 times a day. 18.2 mL 3    ASHWAGANDHA PO Take 1,200 mg by mouth every day.      zinc sulfate (ZINCATE) 220 (50 Zn) MG Cap Take 220 mg by mouth every day.      Selenium 100 MCG Cap Take 1 Capsule by mouth every day.      albuterol 108 (90 Base) MCG/ACT Aero Soln inhalation aerosol Inhale 2 Puffs every 6 hours as needed for Shortness of Breath. 8.5 g 0     No current facility-administered medications for this visit.     Codeine, Eggs, Latex, Pcn [penicillins], and Tape  Past Medical History:   Diagnosis Date    Allergy     Anesthesia     sensitive to medications    Arthritis 02/2019    R thumb    ASTHMA     does not take inhaler( cold air and cigaretts)    Back pain     Bile leak     Bronchitis     hx [id number], 2010, 2019    Cancer ([facility]) 2018    skin    Chickenpox     Cold 01/10/2019    Hypertension     Infectious disease 02/01/2019    flu    Influenza     Kidney stone     Nasal  "drainage     Nephrolithiasis 2008    Obesity     Pain     bilat. shoulders    Pneumonia     Restless leg syndrome     Snoring     Tonsillitis         EXAM:  /88 (BP Location: Left arm, Patient Position: Sitting, BP Cuff Size: Adult)   Pulse 84   Temp 36.5 °C (97.7 °F) (Temporal)   Resp 18   Ht 1.626 m (5' 4\")   Wt 96.6 kg (213 lb)   SpO2 97%   General: Alert, no conversational dyspnea or audible wheeze, non-toxic appearance.  Eyes: PERRL, conjunctiva slightly injected, no eye discharge.  Ears: Normal pinnae,TM's bulging, air/fluid interface bilaterally.  Nares: Patent with clear mucus.  Sinuses: tender over maxillary / frontal sinuses.  Throat: Erythematous injection without exudate.   Neck: Supple, with moderately enlarged cervical nodes.  Lungs: Clear to auscultation bilaterally, no wheeze, crackles or rhonchi.   Heart: Regular rate without murmur.  Skin: Warm and dry without rash.     ASSESSMENT:   1. Acute non-recurrent pansinusitis    2. Encounter for screening mammogram for malignant neoplasm of breast    3. Postmenopausal          PLAN:  1. Educated patient that majority of upper respiratory infections are viral and do not need antibiotics. As symptoms have been worsening over the last week, will treat with antibiotics.  2. Twice daily use of nasal saline rinse or Neti-Pot.  3. OTC anti-pyretics and decongestants as needed.  4. Follow-up in office or urgent care for worsening symptoms, difficulty breathing, lack of expected recovery, or should new symptoms or problems arise.    "

## 2024-12-03 ENCOUNTER — OFFICE VISIT (OUTPATIENT)
Dept: MEDICAL GROUP | Facility: PHYSICIAN GROUP | Age: 74
End: 2024-12-03
Payer: MEDICARE

## 2024-12-03 VITALS
DIASTOLIC BLOOD PRESSURE: 45 MMHG | WEIGHT: 212 LBS | RESPIRATION RATE: 16 BRPM | HEART RATE: 80 BPM | BODY MASS INDEX: 36.19 KG/M2 | HEIGHT: 64 IN | TEMPERATURE: 98.2 F | OXYGEN SATURATION: 95 % | SYSTOLIC BLOOD PRESSURE: 118 MMHG

## 2024-12-03 DIAGNOSIS — B34.9 VIRAL INFECTION: ICD-10-CM

## 2024-12-03 DIAGNOSIS — J06.9 UPPER RESPIRATORY TRACT INFECTION, UNSPECIFIED TYPE: ICD-10-CM

## 2024-12-03 LAB
FLUAV RNA SPEC QL NAA+PROBE: NEGATIVE
FLUBV RNA SPEC QL NAA+PROBE: NEGATIVE
RSV RNA SPEC QL NAA+PROBE: NEGATIVE
SARS-COV-2 RNA RESP QL NAA+PROBE: NEGATIVE

## 2024-12-03 PROCEDURE — 0241U POCT CEPHEID COV-2, FLU A/B, RSV - PCR: CPT | Performed by: NURSE PRACTITIONER

## 2024-12-03 PROCEDURE — 3078F DIAST BP <80 MM HG: CPT | Performed by: NURSE PRACTITIONER

## 2024-12-03 PROCEDURE — 3074F SYST BP LT 130 MM HG: CPT | Performed by: NURSE PRACTITIONER

## 2024-12-03 PROCEDURE — 99213 OFFICE O/P EST LOW 20 MIN: CPT | Performed by: NURSE PRACTITIONER

## 2024-12-03 RX ORDER — METHYLPREDNISOLONE 4 MG/1
TABLET ORAL
Qty: 21 TABLET | Refills: 0 | Status: SHIPPED | OUTPATIENT
Start: 2024-12-03

## 2024-12-03 NOTE — PROGRESS NOTES
Family Nurse Practitioner Student Note    Cosigner/Preceptor: CESAR Peterson    Chief Complaint:                                                                                                                                 Chief Complaint   Patient presents with    Pharyngitis     Right ear x 8 days/ not getting better after treatment        HPI:   Bella Navarro is a 74 y.o. female who presents for follow up of feeling unwell for the last ~10 days. She has a sore throat, right ear pain, sinus tenderness, occasional dry cough. Thinks she has had some fevers because when she gets active she starts sweating and gets overheated easily. Only thing other than the antibiotics that she has taken is OnGuard. No longer using her nasal sprays as she had not had any need for them.     Current Outpatient Medications   Medication Sig Dispense Refill    methylPREDNISolone (MEDROL DOSEPAK) 4 MG Tablet Therapy Pack As directed on the packaging label. 21 Tablet 0    azelastine (ASTELIN) 137 MCG/SPRAY nasal spray Administer 1 Spray into affected nostril(S) 2 times a day. 30 mL 3    fluticasone (FLONASE) 50 MCG/ACT nasal spray Administer 1 Spray into affected nostril(S) 2 times a day. 18.2 mL 3    ASHWAGANDHA PO Take 1,200 mg by mouth every day.      zinc sulfate (ZINCATE) 220 (50 Zn) MG Cap Take 220 mg by mouth every day.      Selenium 100 MCG Cap Take 1 Capsule by mouth every day.      albuterol 108 (90 Base) MCG/ACT Aero Soln inhalation aerosol Inhale 2 Puffs every 6 hours as needed for Shortness of Breath. 8.5 g 0     No current facility-administered medications for this visit.       Allergies as of 12/03/2024 - Reviewed 12/03/2024   Allergen Reaction Noted    Codeine Anaphylaxis 08/06/2014    Eggs Diarrhea and Vomiting 08/06/2014    Latex Rash 01/13/2016    Pcn [penicillins] Shortness of Breath 08/06/2014    Tape  02/01/2019        ROS:  All systems negative expect as addressed in assessment and plan.  "    Assessment:  /45 (BP Location: Left arm, Patient Position: Sitting)   Pulse 80   Temp 36.8 °C (98.2 °F) (Temporal)   Resp 16   Ht 1.626 m (5' 4\")   Wt 96.2 kg (212 lb)   SpO2 95%   BMI 36.39 kg/m²     Physical Exam  Constitutional:       Appearance: Normal appearance.   HENT:      Head: Normocephalic.      Right Ear: Hearing, tympanic membrane and ear canal normal.      Left Ear: Hearing, tympanic membrane and ear canal normal.      Nose:      Right Sinus: Maxillary sinus tenderness and frontal sinus tenderness present.      Left Sinus: Maxillary sinus tenderness and frontal sinus tenderness present.      Mouth/Throat:      Lips: Pink.      Mouth: Mucous membranes are moist.      Pharynx: Oropharynx is clear. Uvula midline.   Cardiovascular:      Rate and Rhythm: Normal rate.   Pulmonary:      Effort: Pulmonary effort is normal.   Musculoskeletal:         General: Normal range of motion.      Cervical back: Normal range of motion.   Skin:     General: Skin is warm and dry.   Neurological:      Mental Status: She is alert and oriented to person, place, and time.   Psychiatric:         Mood and Affect: Mood normal.         Behavior: Behavior normal.         Thought Content: Thought content normal.         Judgment: Judgment normal.         Plan:  74 y.o. female with the following issues.    1. Viral infection  POCT Cepheid CoV-2, Flu A/B, RSV - PCR    methylPREDNISolone (MEDROL DOSEPAK) 4 MG Tablet Therapy Pack    Bella is requesting follow up on her illness as she has not improved. We will order her viral testing to rule out COVID/FLU/RSV. She is past the time for Paxlovid or Tamiflu.       2. Upper respiratory tract infection, unspecified type  POCT Cepheid CoV-2, Flu A/B, RSV - PCR    methylPREDNISolone (MEDROL DOSEPAK) 4 MG Tablet Therapy Pack    We will provide her with a steroid dose pack to help reduce inflammation. Suggested symptomatic management at home and continued saline nasal washes.  "           Return in about 3 months (around 3/3/2025), or if symptoms worsen or fail to improve.

## 2025-01-09 ENCOUNTER — OFFICE VISIT (OUTPATIENT)
Dept: MEDICAL GROUP | Facility: PHYSICIAN GROUP | Age: 75
End: 2025-01-09
Payer: MEDICARE

## 2025-01-09 VITALS
WEIGHT: 210 LBS | HEIGHT: 64 IN | TEMPERATURE: 98.2 F | DIASTOLIC BLOOD PRESSURE: 80 MMHG | OXYGEN SATURATION: 96 % | RESPIRATION RATE: 12 BRPM | HEART RATE: 90 BPM | SYSTOLIC BLOOD PRESSURE: 116 MMHG | BODY MASS INDEX: 35.85 KG/M2

## 2025-01-09 DIAGNOSIS — E66.9 OBESITY (BMI 30-39.9): ICD-10-CM

## 2025-01-09 DIAGNOSIS — H92.01 RIGHT EAR PAIN: ICD-10-CM

## 2025-01-09 DIAGNOSIS — Z91.81 RISK FOR FALLS: ICD-10-CM

## 2025-01-09 DIAGNOSIS — Z91.81 AT RISK FOR FALLING: ICD-10-CM

## 2025-01-09 PROCEDURE — 99214 OFFICE O/P EST MOD 30 MIN: CPT

## 2025-01-09 PROCEDURE — 3079F DIAST BP 80-89 MM HG: CPT

## 2025-01-09 PROCEDURE — 1125F AMNT PAIN NOTED PAIN PRSNT: CPT

## 2025-01-09 PROCEDURE — 3074F SYST BP LT 130 MM HG: CPT

## 2025-01-09 RX ORDER — DEXAMETHASONE 1 MG
TABLET ORAL
COMMUNITY
Start: 2024-12-27 | End: 2025-01-09

## 2025-01-09 RX ORDER — CIPROFLOXACIN AND DEXAMETHASONE 3; 1 MG/ML; MG/ML
4 SUSPENSION/ DROPS AURICULAR (OTIC) 2 TIMES DAILY
Qty: 7.5 ML | Refills: 0 | Status: SHIPPED | OUTPATIENT
Start: 2025-01-09

## 2025-01-09 ASSESSMENT — ENCOUNTER SYMPTOMS
FALLS: 1
DIZZINESS: 1

## 2025-01-09 ASSESSMENT — PATIENT HEALTH QUESTIONNAIRE - PHQ9: CLINICAL INTERPRETATION OF PHQ2 SCORE: 0

## 2025-01-09 ASSESSMENT — PAIN SCALES - GENERAL: PAINLEVEL_OUTOF10: 3=SLIGHT PAIN

## 2025-01-09 NOTE — PROGRESS NOTES
Verbal consent was acquired by the patient to use [employer] ambient listening note generation during this visit     Subjective:     CC: Diagnoses of Right ear pain, At risk for falling, Risk for falls, and Obesity (BMI 30-39.9) were pertinent to this visit.    HPI:   Bella presents today with    History of Present Illness  The patient presents for evaluation of right ear pain.    She visited an ENT specialist yesterday, where a test involving the application of cold and hot air to her ears was performed. Following this, she experienced severe pain in her right ear upon waking from a nap. She is uncertain if the pain is related to the test. She rates her current pain level as 3 or 4 out of 10, which is significantly less than the previous day's intensity. She continues to experience vertigo, recurrent sinusitis, and ear pain, for which she has been referred to an ENT specialist since 04/2024. She has not applied any oil to her ear today but did so before retiring to bed last night. She has also experienced falls, which she suspects may be related to her vertigo, resulting in knee injuries. She has recently acquired a cane for mobility support. She attempted self-treatment with castor oil and cotton, followed by the application of a hot water bottle, which provided some relief. However, she continues to experience irritation in the ear. She initially used coconut oil but switched to castor oil, which she found more effective.    Current Outpatient Medications   Medication Sig Dispense Refill    ciprofloxacin/dexamethasone (CIPRODEX) 0.3-0.1 % Suspension Administer 4 Drops into affected ear(s) 2 times a day. 7.5 mL 0    azelastine (ASTELIN) 137 MCG/SPRAY nasal spray Administer 1 Spray into affected nostril(S) 2 times a day. 30 mL 3    fluticasone (FLONASE) 50 MCG/ACT nasal spray Administer 1 Spray into affected nostril(S) 2 times a day. 18.2 mL 3    ASHWAGANDHA PO Take 1,200 mg by mouth every day.      zinc  "sulfate (ZINCATE) 220 (50 Zn) MG Cap Take 220 mg by mouth every day.      Selenium 100 MCG Cap Take 1 Capsule by mouth every day.      albuterol 108 (90 Base) MCG/ACT Aero Soln inhalation aerosol Inhale 2 Puffs every 6 hours as needed for Shortness of Breath. 8.5 g 0     No current facility-administered medications for this visit.       Problem   Right Ear Pain   At Risk for Falling   Risk for Falls       ROS:  Review of Systems   HENT:  Positive for ear pain.    Musculoskeletal:  Positive for falls.   Neurological:  Positive for dizziness.   All other systems reviewed and are negative.      Objective:     Exam:  /80 (BP Location: Right arm, Patient Position: Sitting, BP Cuff Size: Adult)   Pulse 90   Temp 36.8 °C (98.2 °F) (Temporal)   Resp 12   Ht 1.626 m (5' 4\")   Wt 95.3 kg (210 lb)   SpO2 96%   BMI 36.05 kg/m²  Body mass index is 36.05 kg/m².    Physical Exam  Vitals reviewed.   Constitutional:       General: She is not in acute distress.     Appearance: Normal appearance. She is obese. She is not ill-appearing.   HENT:      Head: Normocephalic and atraumatic.      Right Ear: Tympanic membrane, ear canal and external ear normal. Tenderness present.      Left Ear: Tympanic membrane, ear canal and external ear normal.   Cardiovascular:      Rate and Rhythm: Normal rate.      Pulses: Normal pulses.   Pulmonary:      Effort: Pulmonary effort is normal. No respiratory distress.   Skin:     General: Skin is warm and dry.      Findings: No rash.   Neurological:      General: No focal deficit present.      Mental Status: She is alert and oriented to person, place, and time.   Psychiatric:         Mood and Affect: Mood normal.         Behavior: Behavior normal.         Assessment & Plan:     74 y.o. female with the following -     Assessment & Plan  1. Right ear pain.  The patient's symptoms may be due to irritation of the eardrum and nerves from the air stimulation test conducted by the ENT specialist. " There is no evidence of infection or redness upon examination. A prescription for Ciprodex eardrops, which contain both an antibiotic and anti-inflammatory, has been provided. She is advised to continue using castor oil for another day to see if symptoms improve. If there is no improvement or if symptoms worsen over the weekend, she should commence the use of Ciprodex eardrops.    Problem List Items Addressed This Visit       Obesity (BMI 30-39.9)    Relevant Orders    Patient identified as having weight management issue.  Appropriate orders and counseling given.    Right ear pain     Acute, unstable. Reports was seen in ENT yesterday with testing done. Began to experience severe pain in right ear by the evening. Reports pain today remains, but is reduced. She had applied bridger oil to ear which seems to have helped symptoms.     Will provide topical antibiotic/steroid for pain to be used BID x7 days.           Relevant Medications    ciprofloxacin/dexamethasone (CIPRODEX) 0.3-0.1 % Suspension    At risk for falling     Reports 2 falls recently, fell onto left knee.  Has recurrent vertigo symptoms, but unsure if this caused the falls. Each occurrence has been outside, has enabled fall precautions in her home with no rugs/well lit, tile and carpet jose.   Has purchased cane to assist ambulation.   Reports left knee and skin abrasion injuries.          Relevant Orders    Patient identified as fall risk.  Appropriate orders and counseling given.    Risk for falls     HCC Gap Form    Diagnosis: E66.01 - Severe obesity (HCC)  Z68.36 - Body mass index (BMI) of 36.0-36.9 in adult  Comorbidity Diagnosis: Dyslipidemia  The current BMI is 36.05 kg/m² as of 01/09/25.    Past BMI Values:  01/09/25 : 36.05 kg/m². 12/03/24 : 36.39 kg/m². 11/26/24 : 36.56 kg/m².   Assessment and plan: Chronic, stable. Encouraged healthy diet and physical activity changes with a goal of weight loss. Follow up at least annually. The comorbid  condition is stable based on today's assessment. Continue current treatment plan including control of risk factors. Follow up in 3 months.  Last edited 01/09/25 10:21 PST by BRIAN Cornelius.           Patient was educated in proper administration of medication(s) ordered today including safety, possible SE, risks, benefits, rationale and alternatives to therapy.   Supportive care, differential diagnoses, and indications for immediate follow-up discussed with patient.    Pathogenesis of diagnosis discussed including typical length and natural progression.    Instructed to return to clinic or nearest emergency department for any change in condition, further concerns, or worsening of symptoms.  Patient states understanding of the plan of care and discharge instructions.    Return if symptoms worsen or fail to improve.    Please note that this dictation was created using voice recognition software. I have made every reasonable attempt to correct obvious errors, but I expect that there are errors of grammar and possibly content that I did not discover before finalizing the note.

## 2025-01-09 NOTE — ASSESSMENT & PLAN NOTE
Acute, unstable. Reports was seen in ENT yesterday with testing done. Began to experience severe pain in right ear by the evening. Reports pain today remains, but is reduced. She had applied bridger oil to ear which seems to have helped symptoms.     Will provide topical antibiotic/steroid for pain to be used BID x7 days.

## 2025-01-09 NOTE — ASSESSMENT & PLAN NOTE
Reports 2 falls recently, fell onto left knee.  Has recurrent vertigo symptoms, but unsure if this caused the falls. Each occurrence has been outside, has enabled fall precautions in her home with no rugs/well lit, tile and carpet jose.   Has purchased cane to assist ambulation.   Reports left knee and skin abrasion injuries.

## 2025-06-09 ENCOUNTER — APPOINTMENT (OUTPATIENT)
Dept: MEDICAL GROUP | Facility: PHYSICIAN GROUP | Age: 75
End: 2025-06-09
Payer: MEDICARE

## (undated) DEVICE — CLOSURE SKIN STRIP 1/4 X 3 IN - (STERI STRIP) (50/BX)

## (undated) DEVICE — SODIUM CHL IRRIGATION 0.9% 1000ML (12EA/CA)

## (undated) DEVICE — GOWN SURGICAL XX-LARGE - (28EA/CA) SIRUS NON REINFORCED

## (undated) DEVICE — NEEDLE NON SAFETY HYPO 22 GA X 1 1/2 IN (100/BX)

## (undated) DEVICE — HUMID-VENT HEAT AND MOISTURE EXCHANGE- (50/BX)

## (undated) DEVICE — KIT ROOM DECONTAMINATION

## (undated) DEVICE — TUBE CONNECTING SUCTION - CLEAR PLASTIC STERILE 72 IN (50EA/CA)

## (undated) DEVICE — BOVIE NEEDLE TIP 3CM COLORADO

## (undated) DEVICE — SUTURE 6-0 PROLENE P-3 - (12/BX)

## (undated) DEVICE — GLOVE, LITE (PAIR)

## (undated) DEVICE — LACTATED RINGERS INJ 1000 ML - (14EA/CA 60CA/PF)

## (undated) DEVICE — ELECTRODE 850 FOAM ADHESIVE - HYDROGEL RADIOTRNSPRNT (50/PK)

## (undated) DEVICE — BAG, SPONGE COUNT 50600

## (undated) DEVICE — SUCTION INSTRUMENT YANKAUER BULBOUS TIP W/O VENT (50EA/CA)

## (undated) DEVICE — MASK ANESTHESIA ADULT  - (100/CA)

## (undated) DEVICE — GOWN WARMING STANDARD FLEX - (30/CA)

## (undated) DEVICE — SYRINGE 10 ML CONTROL LL (25EA/BX 4BX/CA)

## (undated) DEVICE — GLOVE SZ 7.5 LF PROTEXIS (50PR/BX)

## (undated) DEVICE — KIT ANESTHESIA W/CIRCUIT & 3/LT BAG W/FILTER (20EA/CA)

## (undated) DEVICE — HEAD HOLDER JUNIOR/ADULT

## (undated) DEVICE — GLOVE BIOGEL PI ORTHO SZ 7.5 PF LF (40PR/BX)

## (undated) DEVICE — SENSOR SPO2 NEO LNCS ADHESIVE (20/BX) SEE USER NOTES

## (undated) DEVICE — SUTURE 5-0 VICRYL PLUS P-3 18 (36PK/BX)"

## (undated) DEVICE — GLOVE BIOGEL SZ 8.5 SURGICAL PF LTX - (50PR/BX 4BX/CA)

## (undated) DEVICE — NEPTUNE 4 PORT MANIFOLD - (20/PK)

## (undated) DEVICE — ELECTRODE DUAL RETURN W/ CORD - (50/PK)

## (undated) DEVICE — GLOVE BIOGEL PI INDICATOR SZ 8.0 SURGICAL PF LF -(50/BX 4BX/CA)

## (undated) DEVICE — CANISTER SUCTION RIGID RED 1500CC (40EA/CA)

## (undated) DEVICE — SUTURE GENERAL

## (undated) DEVICE — WATER IRRIGATION STERILE 1000ML (12EA/CA)

## (undated) DEVICE — PROTECTOR ULNA NERVE - (36PR/CA)

## (undated) DEVICE — SUTURE 5-0 PROLENE P-3 - (12/BX)

## (undated) DEVICE — PACK MINOR BASIN - (2EA/CA)